# Patient Record
Sex: FEMALE | Race: AMERICAN INDIAN OR ALASKA NATIVE | NOT HISPANIC OR LATINO | URBAN - METROPOLITAN AREA
[De-identification: names, ages, dates, MRNs, and addresses within clinical notes are randomized per-mention and may not be internally consistent; named-entity substitution may affect disease eponyms.]

---

## 2017-02-22 ENCOUNTER — EMERGENCY (EMERGENCY)
Facility: HOSPITAL | Age: 32
LOS: 0 days | Discharge: HOME | End: 2017-02-22

## 2017-06-27 DIAGNOSIS — L50.0 ALLERGIC URTICARIA: ICD-10-CM

## 2017-06-27 DIAGNOSIS — R06.02 SHORTNESS OF BREATH: ICD-10-CM

## 2018-12-30 ENCOUNTER — INPATIENT (INPATIENT)
Facility: HOSPITAL | Age: 33
LOS: 10 days | Discharge: HOME HEALTH | End: 2019-01-10
Attending: INTERNAL MEDICINE | Admitting: INTERNAL MEDICINE
Payer: COMMERCIAL

## 2018-12-30 VITALS
HEART RATE: 76 BPM | TEMPERATURE: 99 F | SYSTOLIC BLOOD PRESSURE: 141 MMHG | RESPIRATION RATE: 17 BRPM | DIASTOLIC BLOOD PRESSURE: 86 MMHG | OXYGEN SATURATION: 99 %

## 2018-12-30 PROCEDURE — 99291 CRITICAL CARE FIRST HOUR: CPT

## 2018-12-30 RX ORDER — ONDANSETRON 8 MG/1
4 TABLET, FILM COATED ORAL EVERY 4 HOURS
Qty: 0 | Refills: 0 | Status: DISCONTINUED | OUTPATIENT
Start: 2018-12-30 | End: 2019-01-07

## 2018-12-30 RX ORDER — ACETAMINOPHEN 500 MG
1000 TABLET ORAL ONCE
Qty: 0 | Refills: 0 | Status: COMPLETED | OUTPATIENT
Start: 2018-12-30 | End: 2018-12-30

## 2018-12-30 RX ORDER — FENTANYL CITRATE 50 UG/ML
25 INJECTION INTRAVENOUS ONCE
Qty: 0 | Refills: 0 | Status: DISCONTINUED | OUTPATIENT
Start: 2018-12-30 | End: 2018-12-30

## 2018-12-30 RX ORDER — SODIUM CHLORIDE 9 MG/ML
1000 INJECTION INTRAMUSCULAR; INTRAVENOUS; SUBCUTANEOUS
Qty: 0 | Refills: 0 | Status: DISCONTINUED | OUTPATIENT
Start: 2018-12-30 | End: 2019-01-03

## 2018-12-30 RX ORDER — NICARDIPINE HYDROCHLORIDE 30 MG/1
5 CAPSULE, EXTENDED RELEASE ORAL
Qty: 40 | Refills: 0 | Status: DISCONTINUED | OUTPATIENT
Start: 2018-12-30 | End: 2019-01-04

## 2018-12-30 RX ORDER — MAGNESIUM SULFATE 500 MG/ML
0.5 VIAL (ML) INJECTION
Qty: 40 | Refills: 0 | Status: DISCONTINUED | OUTPATIENT
Start: 2018-12-30 | End: 2019-01-03

## 2018-12-30 RX ORDER — LEVETIRACETAM 250 MG/1
750 TABLET, FILM COATED ORAL EVERY 12 HOURS
Qty: 0 | Refills: 0 | Status: DISCONTINUED | OUTPATIENT
Start: 2018-12-30 | End: 2019-01-09

## 2018-12-30 RX ORDER — NIMODIPINE 60 MG/10ML
30 SOLUTION ORAL
Qty: 0 | Refills: 0 | Status: DISCONTINUED | OUTPATIENT
Start: 2018-12-30 | End: 2019-01-07

## 2018-12-30 RX ADMIN — FENTANYL CITRATE 25 MICROGRAM(S): 50 INJECTION INTRAVENOUS at 23:24

## 2018-12-30 RX ADMIN — NIMODIPINE 30 MILLIGRAM(S): 60 SOLUTION ORAL at 23:25

## 2018-12-30 RX ADMIN — NICARDIPINE HYDROCHLORIDE 25 MG/HR: 30 CAPSULE, EXTENDED RELEASE ORAL at 22:18

## 2018-12-30 RX ADMIN — Medication 400 MILLIGRAM(S): at 22:13

## 2018-12-30 RX ADMIN — SODIUM CHLORIDE 50 MILLILITER(S): 9 INJECTION INTRAMUSCULAR; INTRAVENOUS; SUBCUTANEOUS at 22:21

## 2018-12-30 NOTE — CONSULT NOTE ADULT - ASSESSMENT
34 y/o female with SAH  - Neuro checks q1 hr, low thresh hold for repeat scan  - Keppra 750 q12  - Mag drip  - Cardene drip  - NPO  - IV fluids  - CT head/neck and CTA head  - HOB elevated  - Nimodipine  - BP goal of systolic <120-130  - Seen and examined by Dr. Gamino

## 2018-12-30 NOTE — CONSULT NOTE ADULT - ATTENDING COMMENTS
As above pt HH 2, Al 1. CTH concerning for aneurysmal SAH. CTA with no obvious aneurysm. Plan for angio in AM. Please call immediately for any change in ms/neuroexam

## 2018-12-30 NOTE — CONSULT NOTE ADULT - SUBJECTIVE AND OBJECTIVE BOX
INTERVAL HPI/OVERNIGHT EVENTS:    HPI: 32 y/o female with hx of gestational hypertension presenting with severe headache since 3:30pm today. Pt is post partum day 2. Seen originally at [facility] and found to have SAH on CT. Now pt continues complaining of severe HA 7 out of 10 in intensity and worsening. She has photophobia as well Slightly lethargic appearing but answering appropriately and following commands.      PAST MEDICAL & SURGICAL HISTORY:      MEDICATIONS  (STANDING):    MEDICATIONS  (PRN):      Allergies      Intolerances        PHYSICAL EXAM:    GENERAL: Moderate distress secondary to diffuse headache, alert, arousable, following commands, answering appropriately.   HEAD:  Atraumatic, Normocephalic  EYES: EOMI, PERRLA, conjunctiva and sclera clear  NERVOUS SYSTEM:  Awake  alert oriented to self, place situation   Fund of knowledge, recent and remote memory are intact   Mood; normal affect   CN II-XII intact PERRRL, EOMI, no ptosis, no Nystagmus, normal shoulder shrug   Face symmetrical tongue is midline speech is clear and fluent  Motor: 5/5 UE/LE all muscle groups   Sensory: No deficit to light touch   Gait is not tested      EXTREMITIES:  2+ Peripheral Pulses, No clubbing, cyanosis, or edema      LABS:                RADIOLOGY & ADDITIONAL TESTS: INTERVAL HPI/OVERNIGHT EVENTS:    HPI: 32 y/o female with hx of gestational hypertension presenting with severe headache since 3:30pm today. Pt is post partum day 2. Seen originally at [facility] and found to have SAH on CT. Now pt continues complaining of severe HA 7 out of 10 in intensity and worsening. She has photophobia as well Slightly lethargic appearing but answering appropriately and following commands.      PAST MEDICAL & SURGICAL HISTORY:      MEDICATIONS  (STANDING):    MEDICATIONS  (PRN):      Allergies      Intolerances        PHYSICAL EXAM:    GENERAL: Moderate distress secondary to diffuse headache, alert, arousable, following commands, answering appropriately.   HEAD:  Atraumatic, Normocephalic  EYES: EOMI, PERRLA, conjunctiva and sclera clear  NERVOUS SYSTEM:  Awake  alert oriented to self, place situation   Fund of knowledge, recent and remote memory are intact   Mood; normal affect   CN II-XII intact PERRRL, EOMI, no ptosis, no Nystagmus, normal shoulder shrug   Face symmetrical tongue is midline speech is clear and fluent  Motor: 5/5 UE/LE all muscle groups   Sensory: No deficit to light touch   Gait is not tested

## 2018-12-31 LAB
ALBUMIN SERPL ELPH-MCNC: 3.3 G/DL — LOW (ref 3.5–5.2)
ALBUMIN SERPL ELPH-MCNC: 3.4 G/DL — LOW (ref 3.5–5.2)
ALP SERPL-CCNC: 104 U/L — SIGNIFICANT CHANGE UP (ref 30–115)
ALP SERPL-CCNC: 123 U/L — HIGH (ref 30–115)
ALT FLD-CCNC: 14 U/L — SIGNIFICANT CHANGE UP (ref 0–41)
ALT FLD-CCNC: 15 U/L — SIGNIFICANT CHANGE UP (ref 0–41)
ANION GAP SERPL CALC-SCNC: 15 MMOL/L — HIGH (ref 7–14)
ANION GAP SERPL CALC-SCNC: 16 MMOL/L — HIGH (ref 7–14)
APPEARANCE UR: CLEAR — SIGNIFICANT CHANGE UP
APTT BLD: 29.3 SEC — SIGNIFICANT CHANGE UP (ref 27–39.2)
AST SERPL-CCNC: 20 U/L — SIGNIFICANT CHANGE UP (ref 0–41)
AST SERPL-CCNC: 21 U/L — SIGNIFICANT CHANGE UP (ref 0–41)
BASOPHILS # BLD AUTO: 0.06 K/UL — SIGNIFICANT CHANGE UP (ref 0–0.2)
BASOPHILS NFR BLD AUTO: 0.6 % — SIGNIFICANT CHANGE UP (ref 0–1)
BILIRUB SERPL-MCNC: 0.3 MG/DL — SIGNIFICANT CHANGE UP (ref 0.2–1.2)
BILIRUB SERPL-MCNC: 0.3 MG/DL — SIGNIFICANT CHANGE UP (ref 0.2–1.2)
BILIRUB UR-MCNC: NEGATIVE — SIGNIFICANT CHANGE UP
BUN SERPL-MCNC: 4 MG/DL — LOW (ref 10–20)
BUN SERPL-MCNC: 5 MG/DL — LOW (ref 10–20)
CALCIUM SERPL-MCNC: 8.4 MG/DL — LOW (ref 8.5–10.1)
CALCIUM SERPL-MCNC: 8.8 MG/DL — SIGNIFICANT CHANGE UP (ref 8.5–10.1)
CHLORIDE SERPL-SCNC: 100 MMOL/L — SIGNIFICANT CHANGE UP (ref 98–110)
CHLORIDE SERPL-SCNC: 99 MMOL/L — SIGNIFICANT CHANGE UP (ref 98–110)
CHOLEST SERPL-MCNC: 220 MG/DL — HIGH (ref 100–200)
CK MB CFR SERPL CALC: 3.7 NG/ML — SIGNIFICANT CHANGE UP (ref 0.6–6.3)
CK SERPL-CCNC: 130 U/L — SIGNIFICANT CHANGE UP (ref 0–225)
CO2 SERPL-SCNC: 22 MMOL/L — SIGNIFICANT CHANGE UP (ref 17–32)
CO2 SERPL-SCNC: 23 MMOL/L — SIGNIFICANT CHANGE UP (ref 17–32)
COLOR SPEC: YELLOW — SIGNIFICANT CHANGE UP
CREAT SERPL-MCNC: 0.5 MG/DL — LOW (ref 0.7–1.5)
CREAT SERPL-MCNC: 0.5 MG/DL — LOW (ref 0.7–1.5)
DIFF PNL FLD: ABNORMAL
EOSINOPHIL # BLD AUTO: 0.34 K/UL — SIGNIFICANT CHANGE UP (ref 0–0.7)
EOSINOPHIL NFR BLD AUTO: 3.4 % — SIGNIFICANT CHANGE UP (ref 0–8)
GLUCOSE SERPL-MCNC: 123 MG/DL — HIGH (ref 70–99)
GLUCOSE SERPL-MCNC: 98 MG/DL — SIGNIFICANT CHANGE UP (ref 70–99)
GLUCOSE UR QL: NEGATIVE — SIGNIFICANT CHANGE UP
HCT VFR BLD CALC: 37.9 % — SIGNIFICANT CHANGE UP (ref 37–47)
HCT VFR BLD CALC: 40 % — SIGNIFICANT CHANGE UP (ref 37–47)
HDLC SERPL-MCNC: 55 MG/DL — SIGNIFICANT CHANGE UP
HGB BLD-MCNC: 12.5 G/DL — SIGNIFICANT CHANGE UP (ref 12–16)
HGB BLD-MCNC: 13.5 G/DL — SIGNIFICANT CHANGE UP (ref 12–16)
IMM GRANULOCYTES NFR BLD AUTO: 0.8 % — HIGH (ref 0.1–0.3)
INR BLD: 0.88 RATIO — SIGNIFICANT CHANGE UP (ref 0.65–1.3)
KETONES UR-MCNC: NEGATIVE — SIGNIFICANT CHANGE UP
LEUKOCYTE ESTERASE UR-ACNC: NEGATIVE — SIGNIFICANT CHANGE UP
LIPID PNL WITH DIRECT LDL SERPL: 146 MG/DL — HIGH (ref 4–129)
LYMPHOCYTES # BLD AUTO: 1.92 K/UL — SIGNIFICANT CHANGE UP (ref 1.2–3.4)
LYMPHOCYTES # BLD AUTO: 19 % — LOW (ref 20.5–51.1)
MAGNESIUM SERPL-MCNC: 1.8 MG/DL — SIGNIFICANT CHANGE UP (ref 1.8–2.4)
MAGNESIUM SERPL-MCNC: 2.3 MG/DL — SIGNIFICANT CHANGE UP (ref 1.8–2.4)
MCHC RBC-ENTMCNC: 28.2 PG — SIGNIFICANT CHANGE UP (ref 27–31)
MCHC RBC-ENTMCNC: 29.2 PG — SIGNIFICANT CHANGE UP (ref 27–31)
MCHC RBC-ENTMCNC: 33 G/DL — SIGNIFICANT CHANGE UP (ref 32–37)
MCHC RBC-ENTMCNC: 33.8 G/DL — SIGNIFICANT CHANGE UP (ref 32–37)
MCV RBC AUTO: 85.4 FL — SIGNIFICANT CHANGE UP (ref 81–99)
MCV RBC AUTO: 86.4 FL — SIGNIFICANT CHANGE UP (ref 81–99)
MONOCYTES # BLD AUTO: 0.51 K/UL — SIGNIFICANT CHANGE UP (ref 0.1–0.6)
MONOCYTES NFR BLD AUTO: 5 % — SIGNIFICANT CHANGE UP (ref 1.7–9.3)
NEUTROPHILS # BLD AUTO: 7.19 K/UL — HIGH (ref 1.4–6.5)
NEUTROPHILS NFR BLD AUTO: 71.2 % — SIGNIFICANT CHANGE UP (ref 42.2–75.2)
NITRITE UR-MCNC: NEGATIVE — SIGNIFICANT CHANGE UP
NRBC # BLD: 0 /100 WBCS — SIGNIFICANT CHANGE UP (ref 0–0)
NT-PROBNP SERPL-SCNC: 311 PG/ML — HIGH (ref 0–300)
OSMOLALITY UR: 513 MOS/KG — SIGNIFICANT CHANGE UP (ref 50–1400)
PH UR: 8 — SIGNIFICANT CHANGE UP (ref 5–8)
PLATELET # BLD AUTO: 217 K/UL — SIGNIFICANT CHANGE UP (ref 130–400)
PLATELET # BLD AUTO: 219 K/UL — SIGNIFICANT CHANGE UP (ref 130–400)
POTASSIUM SERPL-MCNC: 3.6 MMOL/L — SIGNIFICANT CHANGE UP (ref 3.5–5)
POTASSIUM SERPL-MCNC: 3.7 MMOL/L — SIGNIFICANT CHANGE UP (ref 3.5–5)
POTASSIUM SERPL-SCNC: 3.6 MMOL/L — SIGNIFICANT CHANGE UP (ref 3.5–5)
POTASSIUM SERPL-SCNC: 3.7 MMOL/L — SIGNIFICANT CHANGE UP (ref 3.5–5)
PROT SERPL-MCNC: 5.8 G/DL — LOW (ref 6–8)
PROT SERPL-MCNC: 6.1 G/DL — SIGNIFICANT CHANGE UP (ref 6–8)
PROT UR-MCNC: ABNORMAL
PROTHROM AB SERPL-ACNC: 10.1 SEC — SIGNIFICANT CHANGE UP (ref 9.95–12.87)
RBC # BLD: 4.44 M/UL — SIGNIFICANT CHANGE UP (ref 4.2–5.4)
RBC # BLD: 4.63 M/UL — SIGNIFICANT CHANGE UP (ref 4.2–5.4)
RBC # FLD: 13.2 % — SIGNIFICANT CHANGE UP (ref 11.5–14.5)
RBC # FLD: 13.4 % — SIGNIFICANT CHANGE UP (ref 11.5–14.5)
SODIUM SERPL-SCNC: 137 MMOL/L — SIGNIFICANT CHANGE UP (ref 135–146)
SODIUM SERPL-SCNC: 138 MMOL/L — SIGNIFICANT CHANGE UP (ref 135–146)
SODIUM UR-SCNC: 197 MMOL/L — SIGNIFICANT CHANGE UP
SP GR SPEC: >=1.03 — SIGNIFICANT CHANGE UP (ref 1.01–1.03)
TOTAL CHOLESTEROL/HDL RATIO MEASUREMENT: 4 RATIO — SIGNIFICANT CHANGE UP (ref 4–5.5)
TRIGL SERPL-MCNC: 247 MG/DL — HIGH (ref 10–149)
TROPONIN T SERPL-MCNC: <0.01 NG/ML — SIGNIFICANT CHANGE UP
TSH SERPL-MCNC: 2.3 UIU/ML — SIGNIFICANT CHANGE UP (ref 0.27–4.2)
UROBILINOGEN FLD QL: 0.2 — SIGNIFICANT CHANGE UP (ref 0.2–0.2)
WBC # BLD: 10.1 K/UL — SIGNIFICANT CHANGE UP (ref 4.8–10.8)
WBC # BLD: 9.82 K/UL — SIGNIFICANT CHANGE UP (ref 4.8–10.8)
WBC # FLD AUTO: 10.1 K/UL — SIGNIFICANT CHANGE UP (ref 4.8–10.8)
WBC # FLD AUTO: 9.82 K/UL — SIGNIFICANT CHANGE UP (ref 4.8–10.8)

## 2018-12-31 PROCEDURE — 99233 SBSQ HOSP IP/OBS HIGH 50: CPT

## 2018-12-31 RX ORDER — PANTOPRAZOLE SODIUM 20 MG/1
40 TABLET, DELAYED RELEASE ORAL DAILY
Qty: 0 | Refills: 0 | Status: DISCONTINUED | OUTPATIENT
Start: 2018-12-31 | End: 2019-01-02

## 2018-12-31 RX ORDER — LEVETIRACETAM 250 MG/1
750 TABLET, FILM COATED ORAL ONCE
Qty: 0 | Refills: 0 | Status: COMPLETED | OUTPATIENT
Start: 2018-12-31 | End: 2018-12-31

## 2018-12-31 RX ORDER — ACETAMINOPHEN 500 MG
1000 TABLET ORAL ONCE
Qty: 0 | Refills: 0 | Status: COMPLETED | OUTPATIENT
Start: 2018-12-31 | End: 2018-12-31

## 2018-12-31 RX ORDER — ACETAMINOPHEN 500 MG
1000 TABLET ORAL ONCE
Qty: 0 | Refills: 0 | Status: DISCONTINUED | OUTPATIENT
Start: 2018-12-31 | End: 2018-12-31

## 2018-12-31 RX ORDER — FENTANYL CITRATE 50 UG/ML
25 INJECTION INTRAVENOUS ONCE
Qty: 0 | Refills: 0 | Status: DISCONTINUED | OUTPATIENT
Start: 2018-12-31 | End: 2018-12-31

## 2018-12-31 RX ORDER — ACETAMINOPHEN 500 MG
650 TABLET ORAL EVERY 6 HOURS
Qty: 0 | Refills: 0 | Status: DISCONTINUED | OUTPATIENT
Start: 2018-12-31 | End: 2019-01-03

## 2018-12-31 RX ADMIN — NIMODIPINE 30 MILLIGRAM(S): 60 SOLUTION ORAL at 23:02

## 2018-12-31 RX ADMIN — FENTANYL CITRATE 25 MICROGRAM(S): 50 INJECTION INTRAVENOUS at 05:45

## 2018-12-31 RX ADMIN — NIMODIPINE 30 MILLIGRAM(S): 60 SOLUTION ORAL at 18:33

## 2018-12-31 RX ADMIN — NIMODIPINE 30 MILLIGRAM(S): 60 SOLUTION ORAL at 06:35

## 2018-12-31 RX ADMIN — NIMODIPINE 30 MILLIGRAM(S): 60 SOLUTION ORAL at 21:04

## 2018-12-31 RX ADMIN — Medication 400 MILLIGRAM(S): at 09:38

## 2018-12-31 RX ADMIN — Medication 1000 MILLIGRAM(S): at 09:53

## 2018-12-31 RX ADMIN — LEVETIRACETAM 400 MILLIGRAM(S): 250 TABLET, FILM COATED ORAL at 05:32

## 2018-12-31 RX ADMIN — FENTANYL CITRATE 25 MICROGRAM(S): 50 INJECTION INTRAVENOUS at 22:30

## 2018-12-31 RX ADMIN — NIMODIPINE 30 MILLIGRAM(S): 60 SOLUTION ORAL at 00:40

## 2018-12-31 RX ADMIN — Medication 400 MILLIGRAM(S): at 01:04

## 2018-12-31 RX ADMIN — NIMODIPINE 30 MILLIGRAM(S): 60 SOLUTION ORAL at 15:04

## 2018-12-31 RX ADMIN — NIMODIPINE 30 MILLIGRAM(S): 60 SOLUTION ORAL at 08:49

## 2018-12-31 RX ADMIN — FENTANYL CITRATE 25 MICROGRAM(S): 50 INJECTION INTRAVENOUS at 05:31

## 2018-12-31 RX ADMIN — Medication 650 MILLIGRAM(S): at 15:30

## 2018-12-31 RX ADMIN — NIMODIPINE 30 MILLIGRAM(S): 60 SOLUTION ORAL at 13:37

## 2018-12-31 RX ADMIN — LEVETIRACETAM 400 MILLIGRAM(S): 250 TABLET, FILM COATED ORAL at 01:05

## 2018-12-31 RX ADMIN — NIMODIPINE 30 MILLIGRAM(S): 60 SOLUTION ORAL at 03:11

## 2018-12-31 RX ADMIN — Medication 650 MILLIGRAM(S): at 22:00

## 2018-12-31 RX ADMIN — Medication 12.5 GM/HR: at 00:33

## 2018-12-31 RX ADMIN — LEVETIRACETAM 400 MILLIGRAM(S): 250 TABLET, FILM COATED ORAL at 18:33

## 2018-12-31 RX ADMIN — Medication 1000 MILLIGRAM(S): at 05:46

## 2018-12-31 RX ADMIN — FENTANYL CITRATE 25 MICROGRAM(S): 50 INJECTION INTRAVENOUS at 22:15

## 2018-12-31 RX ADMIN — PANTOPRAZOLE SODIUM 40 MILLIGRAM(S): 20 TABLET, DELAYED RELEASE ORAL at 13:38

## 2018-12-31 RX ADMIN — Medication 650 MILLIGRAM(S): at 16:30

## 2018-12-31 RX ADMIN — Medication 650 MILLIGRAM(S): at 21:30

## 2018-12-31 RX ADMIN — Medication 1000 MILLIGRAM(S): at 01:31

## 2018-12-31 RX ADMIN — Medication 400 MILLIGRAM(S): at 05:16

## 2018-12-31 NOTE — PROGRESS NOTE ADULT - ASSESSMENT
Impression:  33 year old woman  presented 2 days post partum with a history of gestational hypertension, PCOS and asthma transferred from [facility] for management of SAH. As per patient she developed severe headache on day of presentation. Headache started while patient laying down and described as sudden in onset, diffuse , 8/10 in intensity and progressively worsened since onset a/w photophobia , nausea but no vomiting. CTH at [facility] showed SAH for which the neurosurgeon at [facility] Dr. Calderon discussed case with Dr. Rider and patient was transferred to [facility] for possible intervention. CTA of the head failed to reveal vascular abnormality. No events overnight, she reports resolution of headache. Exam is non focal, she reports no headache.     Suggestions:  Diagnostic cerebral angiogram +/- embolization.  Continue Cardene.  Continue Nimotipine.  Continue Magnesium.   Keep magnesium >2.  Continue Keppra.  Seizure precautions.

## 2018-12-31 NOTE — H&P ADULT - FAMILY HISTORY
Father  Still living? Yes, Estimated age: Age Unknown  Family history of high cholesterol, Age at diagnosis: Age Unknown     Mother  Still living? Yes, Estimated age: Age Unknown  Family history of hypertension, Age at diagnosis: Age Unknown

## 2018-12-31 NOTE — CONSULT NOTE ADULT - SUBJECTIVE AND OBJECTIVE BOX
CC: SAH POST PARTUM DAY 2      HPI:   34 y/o Right handed  female  with hx of gestational hypertension Post partum day 2  transferred from [facility] for management of SAH. As per patient she developed severe headache since 3:30pm today. Headache described as sudden in onset , worst headache of her life which was diffuse and throbbing in nature , 8/10 in intensity and progressively worsening since afternoon a/w neck pain and photophobia , nausea but no vomiting. Headache worsens with head movements . CTH at [facility] showed SAH and the neurosurgeon at [facility] dr Calderon discussed case with Neuro interventionalist dr Monge and patient was transferred to [facility] for possible  coiling in am .   Patient denies any h/o headache or brain bleed with prior pregnancy , she also denies any coagulopathies.     Home Medications:  Denies taking medications at home    Social history  Denies smoking alcohol or drug use    Family history  Denies significant family history      Neuro Exam:  General inspection: patient appears to be in pain reports 7/10 headache   Orientation: oriented to person, place time and situation, speech and language intact, normal attention and comprehension,   Cranial Nerves: Eomi, vff, face symmetric, has photophobia , no nystagmus noted on this exam, mild periorbital swelling, facial sensation is intact, tongue midline normal shoulder shrug  Motor: Bilateral upper extremity 5/5 no drift             lower extremities 5/5 with some give-way weakness secondary to pain  Sensory exam: intact and symmetric  Coordination:. No dysmetria or limb ataxia  Plantar responses downgoing bilaterally        Terrell and Balbuena Score 2    Allergies    Alcohol (Flushing)  Tamiflu (Unknown)    Intolerances      MEDICATIONS  (STANDING):  levETIRAcetam  IVPB 750 milliGRAM(s) IV Intermittent every 12 hours  magnesium sulfate Infusion 0.5 Gm/Hr (12.5 mL/Hr) IV Continuous <Continuous>  niCARdipine Infusion 5 mG/Hr (25 mL/Hr) IV Continuous <Continuous>  niMODipine 30 milliGRAM(s) Oral every 3 hours  pantoprazole  Injectable 40 milliGRAM(s) IV Push daily  sodium chloride 0.9%. 1000 milliLiter(s) (50 mL/Hr) IV Continuous <Continuous>    MEDICATIONS  (PRN):  ondansetron Injectable 4 milliGRAM(s) IV Push every 4 hours PRN Nausea and/or Vomiting      LABS:                        13.5   9.82  )-----------( 217      ( 31 Dec 2018 01:13 )             40.0     12-31    137  |  99  |  5<L>  ----------------------------<  123<H>  3.6   |  23  |  0.5<L>    Ca    8.8      31 Dec 2018 01:13  Mg     1.8     12-31    TPro  6.1  /  Alb  3.4<L>  /  TBili  0.3  /  DBili  x   /  AST  21  /  ALT  15  /  AlkPhos  123<H>  12-31            Neuro Imaging:  < from: CT Head No Cont (12.30.18 @ 22:56) >  FINDINGS:    Small subarachnoid hemorrhage centered in the left frontotemporal region   (series 3, images 12-17). Local mass effect without midline shift.    The ventricles and cortical sulci areappropriate for the patient's   stated age.    Gray-white matter differentiation is preserved.    There is no evidence for significant space-occupying process or recent   territorial infarction.    The calvarium is intact. Visualized paranasal sinuses and mastoids are   well-aerated.      IMPRESSION:    Small subarachnoid hemorrhage centered in the left frontotemporal region.    < end of copied text >            < from: CT Angio Neck w/ IV Cont (12.30.18 @ 23:18) >    FINDINGS:      CTA neck:     The aortic arch, origin of the great vessels and subclavian arteries are   unremarkable. The bilateral common carotid arteries and internal carotid   arteries are unremarkable without significant stenosis seen.     The bilateral vertebral arteries and basilar artery are unremarkable.    Soft tissues are unremarkable. Visualized osseous structures are   unremarkable.      CTA head:     The distal internal carotid arteries, middle cerebral arteries and   anterior cerebral arteries are unremarkable without significant    stenosis. The basilar artery, superior cerebellar arteries and posterior   cerebral arteries are unremarkable without significant stenosis.    Subarachnoid hemorrhage, better evaluated on concurrent noncontrast CT   head.    No definite vascular malformation is identified.      IMPRESSION:     No large vessel occlusion or significant stenosis of the vessels of the   head and neck.    No definite vascular malformation is identified.    < end of copied text >    EEG:     Echo:   Carotid Doppler: N/A  Telemetry: CC: SAH POST PARTUM DAY 2      HPI:   32 y/o Right handed  female  with hx of gestational hypertension Post partum day 2  transferred from [facility] for management of SAH. As per patient she developed severe headache since 3:30pm today. Headache described as sudden in onset , worst headache of her life which was diffuse and throbbing in nature , 8/10 in intensity and progressively worsening since afternoon a/w neck pain and photophobia , nausea but no vomiting. Headache worsens with head movements . CTH at [facility] showed SAH and the neurosurgeon at [facility] dr Calderon discussed case with Neuro interventionalist dr Monge and patient was transferred to [facility] for possible  coiling in am . Patient denies any h/o headache or brain bleed with prior pregnancy , she also denies any coagulopathies.     Home Medications:  Denies taking medications at home    Social history  Denies smoking alcohol or drug use    Family history  Denies significant family history      Neuro Exam:  General inspection: patient appears to be in pain reports 7/10 headache   Orientation: oriented to person, place time and situation, speech and language intact, normal attention and comprehension,   Cranial Nerves: Eomi, vff, face symmetric, has photophobia , no nystagmus noted on this exam, mild periorbital swelling, facial sensation is intact, tongue midline normal shoulder shrug  Motor: Bilateral upper extremity 5/5 no drift             lower extremities 5/5 with some give-way weakness secondary to pain  Sensory exam: intact and symmetric  Coordination:. No dysmetria or limb ataxia  Plantar responses downgoing bilaterally  +mild nuchal rigidity         Hunt and Balbuena Score 2  Gcs 15      Allergies    Alcohol (Flushing)  Tamiflu (Unknown)    Intolerances      MEDICATIONS  (STANDING):  levETIRAcetam  IVPB 750 milliGRAM(s) IV Intermittent every 12 hours  magnesium sulfate Infusion 0.5 Gm/Hr (12.5 mL/Hr) IV Continuous <Continuous>  niCARdipine Infusion 5 mG/Hr (25 mL/Hr) IV Continuous <Continuous>  niMODipine 30 milliGRAM(s) Oral every 3 hours  pantoprazole  Injectable 40 milliGRAM(s) IV Push daily  sodium chloride 0.9%. 1000 milliLiter(s) (50 mL/Hr) IV Continuous <Continuous>    MEDICATIONS  (PRN):  ondansetron Injectable 4 milliGRAM(s) IV Push every 4 hours PRN Nausea and/or Vomiting      LABS:                        13.5   9.82  )-----------( 217      ( 31 Dec 2018 01:13 )             40.0     12-31    137  |  99  |  5<L>  ----------------------------<  123<H>  3.6   |  23  |  0.5<L>    Ca    8.8      31 Dec 2018 01:13  Mg     1.8     12-31    TPro  6.1  /  Alb  3.4<L>  /  TBili  0.3  /  DBili  x   /  AST  21  /  ALT  15  /  AlkPhos  123<H>  12-31            Neuro Imaging:  < from: CT Head No Cont (12.30.18 @ 22:56) >  FINDINGS:    Small subarachnoid hemorrhage centered in the left frontotemporal region   (series 3, images 12-17). Local mass effect without midline shift.    The ventricles and cortical sulci areappropriate for the patient's   stated age.    Gray-white matter differentiation is preserved.    There is no evidence for significant space-occupying process or recent   territorial infarction.    The calvarium is intact. Visualized paranasal sinuses and mastoids are   well-aerated.      IMPRESSION:    Small subarachnoid hemorrhage centered in the left frontotemporal region.    < end of copied text >            < from: CT Angio Neck w/ IV Cont (12.30.18 @ 23:18) >    FINDINGS:      CTA neck:     The aortic arch, origin of the great vessels and subclavian arteries are   unremarkable. The bilateral common carotid arteries and internal carotid   arteries are unremarkable without significant stenosis seen.     The bilateral vertebral arteries and basilar artery are unremarkable.    Soft tissues are unremarkable. Visualized osseous structures are   unremarkable.      CTA head:     The distal internal carotid arteries, middle cerebral arteries and   anterior cerebral arteries are unremarkable without significant    stenosis. The basilar artery, superior cerebellar arteries and posterior   cerebral arteries are unremarkable without significant stenosis.    Subarachnoid hemorrhage, better evaluated on concurrent noncontrast CT   head.    No definite vascular malformation is identified.      IMPRESSION:     No large vessel occlusion or significant stenosis of the vessels of the   head and neck.    No definite vascular malformation is identified.    < end of copied text >    EEG:     Echo:   Carotid Doppler: N/A  Telemetry: CC: SAH POST PARTUM DAY 2      HPI:   32 y/o Right handed  female  with hx of gestational hypertension Post partum day 2  transferred from [facility] for management of SAH. As per patient she developed severe headache since 3:30pm today. Headache described as sudden in onset , worst headache of her life which was diffuse and throbbing in nature , 8/10 in intensity and progressively worsening since afternoon a/w neck pain and photophobia , nausea but no vomiting. Headache worsens with head movements . CTH at [facility] showed SAH and the neurosurgeon at [facility] dr Calderon discussed case with Neuro interventionalist dr Rider and patient was transferred to [facility] for further treatment by Dr. Rider at [facility]. Patient denies any h/o headache or brain bleed with prior pregnancy , she also denies any coagulopathies.     Home Medications:  Denies taking medications at home    Social history  Denies smoking alcohol or drug use    Family history  Denies significant family history      Neuro Exam:  General inspection: patient appears to be in pain reports 7/10 headache   Orientation: oriented to person, place time and situation, speech and language intact, normal attention and comprehension,   Cranial Nerves: Eomi, vff, face symmetric, has photophobia , no nystagmus noted on this exam, mild periorbital swelling, facial sensation is intact, tongue midline normal shoulder shrug  Motor: Bilateral upper extremity 5/5 no drift             lower extremities 5/5 with some give-way weakness secondary to pain  Sensory exam: intact and symmetric  Coordination:. No dysmetria or limb ataxia  Plantar responses downgoing bilaterally  +mild nuchal rigidity         Hunt and Balbuena Score 2  Gcs 15  m-Al:1      Allergies    Alcohol (Flushing)  Tamiflu (Unknown)    Intolerances      MEDICATIONS  (STANDING):  levETIRAcetam  IVPB 750 milliGRAM(s) IV Intermittent every 12 hours  magnesium sulfate Infusion 0.5 Gm/Hr (12.5 mL/Hr) IV Continuous <Continuous>  niCARdipine Infusion 5 mG/Hr (25 mL/Hr) IV Continuous <Continuous>  niMODipine 30 milliGRAM(s) Oral every 3 hours  pantoprazole  Injectable 40 milliGRAM(s) IV Push daily  sodium chloride 0.9%. 1000 milliLiter(s) (50 mL/Hr) IV Continuous <Continuous>    MEDICATIONS  (PRN):  ondansetron Injectable 4 milliGRAM(s) IV Push every 4 hours PRN Nausea and/or Vomiting      LABS:                        13.5   9.82  )-----------( 217      ( 31 Dec 2018 01:13 )             40.0     12-31    137  |  99  |  5<L>  ----------------------------<  123<H>  3.6   |  23  |  0.5<L>    Ca    8.8      31 Dec 2018 01:13  Mg     1.8     12-31    TPro  6.1  /  Alb  3.4<L>  /  TBili  0.3  /  DBili  x   /  AST  21  /  ALT  15  /  AlkPhos  123<H>  12-31            Neuro Imaging:  < from: CT Head No Cont (12.30.18 @ 22:56) >  FINDINGS:    Small subarachnoid hemorrhage centered in the left frontotemporal region   (series 3, images 12-17). Local mass effect without midline shift.    The ventricles and cortical sulci areappropriate for the patient's   stated age.    Gray-white matter differentiation is preserved.    There is no evidence for significant space-occupying process or recent   territorial infarction.    The calvarium is intact. Visualized paranasal sinuses and mastoids are   well-aerated.      IMPRESSION:    Small subarachnoid hemorrhage centered in the left frontotemporal region.    < end of copied text >            < from: CT Angio Neck w/ IV Cont (12.30.18 @ 23:18) >    FINDINGS:      CTA neck:     The aortic arch, origin of the great vessels and subclavian arteries are   unremarkable. The bilateral common carotid arteries and internal carotid   arteries are unremarkable without significant stenosis seen.     The bilateral vertebral arteries and basilar artery are unremarkable.    Soft tissues are unremarkable. Visualized osseous structures are   unremarkable.      CTA head:     The distal internal carotid arteries, middle cerebral arteries and   anterior cerebral arteries are unremarkable without significant    stenosis. The basilar artery, superior cerebellar arteries and posterior   cerebral arteries are unremarkable without significant stenosis.    Subarachnoid hemorrhage, better evaluated on concurrent noncontrast CT   head.    No definite vascular malformation is identified.      IMPRESSION:     No large vessel occlusion or significant stenosis of the vessels of the   head and neck.    No definite vascular malformation is identified.    < end of copied text >    EEG:     Echo:   Carotid Doppler: N/A  Telemetry:

## 2018-12-31 NOTE — PACU DISCHARGE NOTE - COMMENTS
S/P cerebral angiogram under local and IV sedation   pt taken directly to ICU with cardiac monitor in a stable condition report given to RN

## 2018-12-31 NOTE — PRE-ANESTHESIA EVALUATION ADULT - NSANTHOSAYNRD_GEN_A_CORE
No. PAVAN screening performed.  STOP BANG Legend: 0-2 = LOW Risk; 3-4 = INTERMEDIATE Risk; 5-8 = HIGH Risk

## 2018-12-31 NOTE — CONSULT NOTE ADULT - ASSESSMENT
IMPRESSION:    SAH      PLAN:    CNS: keppra,nimodipine, MRV, interventional neuro f/u    HEENT: Oral care    PULMONARY:  HOB @ 45 degrees    CARDIOVASCULAR: -120    GI: GI prophylaxis.  Feeding     RENAL:  Follow up lytes.  Correct as needed    INFECTIOUS DISEASE: Follow up cultures    HEMATOLOGICAL:  DVT prophylaxis. SCD    ENDOCRINE:  Follow up FS.  Insulin protocol if needed.    MUSCULOSKELETAL:    Monitor in ICU

## 2018-12-31 NOTE — H&P ADULT - NSHPLABSRESULTS_GEN_ALL_CORE
LABS:                        13.5   9.82  )-----------( 217      ( 31 Dec 2018 01:13 )             40.0     31 Dec 2018 01:13    137    |  99     |  5      ----------------------------<  123    3.6     |  23     |  0.5      Ca    8.8        31 Dec 2018 01:13  Mg     1.8       31 Dec 2018 01:13    TPro  6.1    /  Alb  3.4    /  TBili  0.3    /  DBili  x      /  AST  21     /  ALT  15     /  AlkPhos  123    31 Dec 2018 01:13    < from: CT Angio Neck w/ IV Cont (12.30.18 @ 23:18) >    No large vessel occlusion or significant stenosis of the vessels of the   head and neck.    No definite vascular malformation is identified.    < end of copied text >    < from: CT Angio Head w/ IV Cont (12.30.18 @ 23:18) >    No large vessel occlusion or significant stenosis of the vessels of the   head and neck.    No definite vascular malformation is identified.    < end of copied text >    < from: CT Head No Cont (12.30.18 @ 22:56) >    mall subarachnoid hemorrhage centered in the left frontotemporal region.    < end of copied text > LABS:                        13.5   9.82  )-----------( 217      ( 31 Dec 2018 01:13 )             40.0     31 Dec 2018 01:13    137    |  99     |  5      ----------------------------<  123    3.6     |  23     |  0.5      Ca    8.8        31 Dec 2018 01:13  Mg     1.8       31 Dec 2018 01:13    TPro  6.1    /  Alb  3.4    /  TBili  0.3    /  DBili  x      /  AST  21     /  ALT  15     /  AlkPhos  123    31 Dec 2018 01:13    < from: CT Angio Neck w/ IV Cont (12.30.18 @ 23:18) >    No large vessel occlusion or significant stenosis of the vessels of the   head and neck.    No definite vascular malformation is identified.    < end of copied text >    < from: CT Angio Head w/ IV Cont (12.30.18 @ 23:18) >    No large vessel occlusion or significant stenosis of the vessels of the   head and neck.    No definite vascular malformation is identified.    < end of copied text >    < from: CT Head No Cont (12.30.18 @ 22:56) >    Small subarachnoid hemorrhage centered in the left frontotemporal region.    < end of copied text >

## 2018-12-31 NOTE — PROGRESS NOTE ADULT - SUBJECTIVE AND OBJECTIVE BOX
BALDEV KEE  [id number]    HD#  [id number]   SAH non traumatic      Current issues:  33y Female in ICU   c/o ha 3/10 this morning  ,  10/10 last night.  no seizure reported.  no n, v.    no reported motor changes, sensory changes, or vision changes.     HPI:  34 y/o female  who presents 2 days post partum with hx of gestational hypertension, PCOS and asthma transferred from [facility] for management of SAH. As per patient she developed severe headache since 3:30pm on day of presentation. Headache started while patient laying down and described as sudden in onset , diffuse , 8/10 in intensity and progressively worsening since afternoon a/w photophobia , nausea but no vomiting. Headache worsens with head movements. CTH at [facility] showed SAH for which the neurosurgeon at [facility] Dr. Calderon discussed case with dr fontanez and patient was transferred to [facility] for possible intervention like coiling in am .   No history of similar events in past    VS upon admission to MICU bp 120/66 , hr 86, tmax 98.7 (31 Dec 2018 02:15)      Allergies    Alcohol (Flushing)  Tamiflu (Unknown)    Intolerances      MEDICATIONS  (STANDING):  levETIRAcetam  IVPB 750 milliGRAM(s) IV Intermittent every 12 hours  magnesium sulfate Infusion 0.5 Gm/Hr (12.5 mL/Hr) IV Continuous <Continuous>  niCARdipine Infusion 5 mG/Hr (25 mL/Hr) IV Continuous <Continuous>  niMODipine 30 milliGRAM(s) Oral every 3 hours  pantoprazole  Injectable 40 milliGRAM(s) IV Push daily  sodium chloride 0.9%. 1000 milliLiter(s) (50 mL/Hr) IV Continuous <Continuous>    MEDICATIONS  (PRN):  ondansetron Injectable 4 milliGRAM(s) IV Push every 4 hours PRN Nausea and/or Vomiting    Vital Signs Last 24 Hrs  T(C): 36 (31 Dec 2018 04:00), Max: 37.1 (30 Dec 2018 21:30)  T(F): 96.8 (31 Dec 2018 04:00), Max: 98.7 (30 Dec 2018 21:30)  HR: 88 (31 Dec 2018 09:53) (66 - 98)  BP: 127/82 (31 Dec 2018 09:53) (102/54 - 168/98)  BP(mean): 97 (31 Dec 2018 09:53) (71 - 132)  RR: 25 (31 Dec 2018 09:53) (12 - 29)  SpO2: 98% (31 Dec 2018 09:53) (96% - 100%)    I&O's Detail    30 Dec 2018 07:01  -  31 Dec 2018 07:00  --------------------------------------------------------  IN:    IV PiggyBack: 300 mL    magnesium sulfate  Infusion: 100 mL    niCARdipine Infusion: 185 mL    sodium chloride 0.9%.: 500 mL  Total IN: 1085 mL    OUT:    Indwelling Catheter - Urethral: 2500 mL  Total OUT: 2500 mL    Total NET: -1415 mL      31 Dec 2018 07:01  -  31 Dec 2018 10:28  --------------------------------------------------------  IN:    magnesium sulfate  Infusion: 25 mL    niCARdipine Infusion: 25 mL    sodium chloride 0.9%.: 100 mL  Total IN: 150 mL    OUT:    Indwelling Catheter - Urethral: 550 mL  Total OUT: 550 mL    Total NET: -400 mL            138  |  100  |  4<L>  ----------------------------<  98  3.7   |  22  |  0.5<L>    Ca    8.4<L>      31 Dec 2018 05:19  Mg     2.3         TPro  5.8<L>  /  Alb  3.3<L>  /  TBili  0.3  /  DBili  x   /  AST  20  /  ALT  14  /  AlkPhos  104                            12.5   10.10 )-----------( 219      ( 31 Dec 2018 05:19 )             37.9           Exam:  alert and Ox3   PERRLA EOMI  face symmetrical  MAEx4  sensory intact      Plan:  stable  continue with ICU neuro checks  Dr. Newell on case to do Cerebral angiogram this am.   Dr. Gamino aware of above.              Home Medications:    PAST MEDICAL & SURGICAL HISTORY:  Gestational hypertension  Asthma  PCOS (polycystic ovarian syndrome)

## 2018-12-31 NOTE — CONSULT NOTE ADULT - ASSESSMENT
34 y/o Right handed  female  with hx of gestational hypertension Post partum day 2  transferred from [facility] for management of SAH. CTH at [facility] showed SAH and the neurosurgeon at [facility] dr Calderon discussed case with Neuro interventionalist dr Monge and patient was transferred to [facility] for possible coiling in am. CTH shows a small SAH in the left frontotemporal region. Cta h/n failed to reveal a definite vascular malformation.Patient is currently a/o     Plan (as discussed with 32 y/o Right handed  female  with hx of gestational hypertension Post partum day 2  transferred from [facility] for management of SAH. CTH at [facility] showed SAH and the neurosurgeon at [facility] dr Calderon discussed case with Neuro interventionalist dr Monge and patient was transferred to [facility] for possible coiling in am. CTH shows a small SAH in the left frontotemporal region. Cta h/n failed to reveal a definite vascular malformation. Patient is currently a/o following all commands and is non focal . bp on arrival was 168/98      Plan (as discussed with Dr Tereso Brewer)  -ICU monitoring  -neuro checks with vital signs q 1 hr  -keep sbp 120-130   -Start nimodipine 30mg q 3 hrs  -No anticoagulants or antiplatelets  -keep HOB elevated to 30 degrees  -Mechanical dvt prophylaxis (sequential stockings)  -Monitor temperature frequently avoid pyrexia  -start mg drip , monitor mg levels   -Cardene drip  -Keppra 1 gm loading followed by 750mg bid  -please call for acute change in neuro status 34 y/o Right handed  female  with hx of gestational hypertension Post partum day 2  transferred from [facility] for management of SAH. CTH at [facility] showed SAH and the neurosurgeon at [facility] dr Calderon discussed case with Neuro interventionalist dr Monge and patient was transferred to [facility] for possible coiling in am. CTH shows a small SAH in the left frontotemporal region. Cta h/n failed to reveal a definite vascular malformation. Patient is currently a/o following all commands and is non focal . bp on arrival was 168/98      Plan (as discussed with Dr Tereso Brewer)  -ICU monitoring  -neuro checks with vital signs q 1 hr  -keep sbp 120-130   -Start nimodipine 30mg q 3 hrs  -No anticoagulants or antiplatelets  -keep HOB elevated to 30 degrees  -continuous cardiac monitoring  -Mechanical dvt prophylaxis (sequential stockings)  -Monitor temperature frequently avoid pyrexia  -start mg drip , monitor mg levels keep < 4  -Cardene drip  -Keppra 1 gm loading followed by 750mg bid  -please call for acute change in neuro status 32 y/o Right handed  female  with hx of gestational hypertension Post partum day 2  transferred from [facility] for management of SAH. CTH at [facility] showed SAH and the neurosurgeon at [facility] dr Calderon discussed case with Neuro interventionalist dr Monge and patient was transferred to [facility] for possible coiling in am. CTH shows a small SAH in the left frontotemporal region. Cta h/n negative for any vascular malformation. Patient is currently a/o following all commands and is non focal . bp on arrival was 168/98      Plan (as discussed with Dr Tereso rBewer)  -ICU monitoring  -neuro checks with vital signs q 1 hr  -keep sbp 120-130   -Start nimodipine 30mg q 3 hrs  -No anticoagulants or antiplatelets  -keep HOB elevated to 30 degrees  -continuous cardiac monitoring  -Mechanical dvt prophylaxis (sequential stockings)  -Monitor temperature frequently avoid pyrexia  -start mg drip , monitor mg levels keep < 4  -Cardene drip  -Keppra 1 gm loading followed by 750mg bid  -please call for acute change in neuro status

## 2018-12-31 NOTE — H&P ADULT - HISTORY OF PRESENT ILLNESS
32 y/o female Post partum day 2  with hx of gestational hypertension transferred from [facility] for management of SAH   As per patient she developed severe headache since 3:30pm today. Headache described as sudden in onset , diffuse , 8/10 in intensity and progressively worsening since afternoon a/w photophobia , nausea but no vomiting .Headache worsens with head movements . CTH at [facility] showed SAH for which the neurosurgeon at [facility] dr maravilla discussed case with dr fontanez and patient was transferred to [facility] for possible intervention like coiling in am .   No history of similar events in past    VS upon admission to MICU bp 120/66 , hr 86, tmax 98.7 32 y/o female  who presents 2 days post partum with hx of gestational hypertension transferred from [facility] for management of SAH   As per patient she developed severe headache since 3:30pm on day of presentation. Headache described as sudden in onset , diffuse , 8/10 in intensity and progressively worsening since afternoon a/w photophobia , nausea but no vomiting. Headache worsens with head movements. CTH at [facility] showed SAH for which the neurosurgeon at [facility] Dr. Calderon discussed case with dr fontanez and patient was transferred to [facility] for possible intervention like coiling in am .   No history of similar events in past    VS upon admission to MICU bp 120/66 , hr 86, tmax 98.7 34 y/o female  who presents 2 days post partum with hx of gestational hypertension, PCOS and asthma transferred from [facility] for management of SAH. As per patient she developed severe headache since 3:30pm on day of presentation. Headache started while patient laying down and described as sudden in onset , diffuse , 8/10 in intensity and progressively worsening since afternoon a/w photophobia , nausea but no vomiting. Headache worsens with head movements. CTH at [facility] showed SAH for which the neurosurgeon at [facility] Dr. Calderon discussed case with dr fontanez and patient was transferred to [facility] for possible intervention like coiling in am .   No history of similar events in past    VS upon admission to MICU bp 120/66 , hr 86, tmax 98.7

## 2018-12-31 NOTE — PROGRESS NOTE ADULT - SUBJECTIVE AND OBJECTIVE BOX
Post diagnostic cerebral angiogram:  Results pending.    NIHSS post procedure 0.      Neuro checks, vitals, right groin check, distal pulses q15 minutes x2 hours, q30 minutesx 4 hours and q1 hour x18 hours.  Keep in reverse trendelenberg.  Keep systolic blood pressure 100-120.  Care as per ICU team.      Robbie Burris NP  [phone]

## 2018-12-31 NOTE — CONSULT NOTE ADULT - SUBJECTIVE AND OBJECTIVE BOX
Patient is a 33y old  Female who presents with a chief complaint of SAH , transfer from [facility] (31 Dec 2018 12:37)      HPI:  32 y/o female  who presents 2 days post partum with hx of gestational hypertension, PCOS and asthma transferred from [facility] for management of SAH. As per patient she developed severe headache since 3:30pm on day of presentation. Headache started while patient laying down and described as sudden in onset , diffuse , 8/10 in intensity and progressively worsening since afternoon a/w photophobia , nausea but no vomiting. Headache worsens with head movements. CTH at [facility] showed SAH for which the neurosurgeon at [facility] Dr. Calderon discussed case with dr fontanez and patient was transferred to [facility] for possible intervention like coiling in am .   No history of similar events in past    VS upon admission to MICU bp 120/66 , hr 86, tmax 98.7 (31 Dec 2018 02:15)      PAST MEDICAL & SURGICAL HISTORY:  Gestational hypertension  Asthma  PCOS (polycystic ovarian syndrome)      SOCIAL HX:   Smoking     neg                    ETOH      neg                      Other   neg    FAMILY HISTORY:  Family history of hypertension (Mother)  Family history of high cholesterol (Father)      ROS:  See HPI     Allergies    Alcohol (Flushing)  Tamiflu (Unknown)    Intolerances          PHYSICAL EXAM    ICU Vital Signs Last 24 Hrs  T(C): 36.5 (31 Dec 2018 08:00), Max: 37.1 (30 Dec 2018 21:30)  T(F): 97.7 (31 Dec 2018 08:00), Max: 98.7 (30 Dec 2018 21:30)  HR: 76 (31 Dec 2018 14:00) (66 - 98)  BP: 121/70 (31 Dec 2018 12:45) (102/54 - 168/98)  BP(mean): 86 (31 Dec 2018 12:45) (71 - 132)  ABP: --  ABP(mean): --  RR: 13 (31 Dec 2018 14:00) (12 - 31)  SpO2: 97% (31 Dec 2018 14:00) (96% - 100%)      General: in NAD  HEENT:  GISSELLE              Lymph Nodes: No cervical LN   Lungs: Bilateral BS  Cardiovascular: Regular  Abdomen: Soft, Positive BS  Extremities: No clubbing  Skin: Warm  Neurological: Non focal       18 @ 07:01  -  18 @ 07:00  --------------------------------------------------------  IN:    IV PiggyBack: 300 mL    magnesium sulfate  Infusion: 100 mL    niCARdipine Infusion: 185 mL    sodium chloride 0.9%.: 500 mL  Total IN: 1085 mL    OUT:    Indwelling Catheter - Urethral: 2500 mL  Total OUT: 2500 mL    Total NET: -1415 mL      18 @ 07:01  -  18 @ 15:02  --------------------------------------------------------  IN:    magnesium sulfate  Infusion: 87.5 mL    niCARdipine Infusion: 150 mL    sodium chloride 0.9%.: 350 mL  Total IN: 587.5 mL    OUT:    Indwelling Catheter - Urethral: 1850 mL  Total OUT: 1850 mL    Total NET: -1262.5 mL          LABS:                          12.5   10.10 )-----------( 219      ( 31 Dec 2018 05:19 )             37.9                                                   138  |  100  |  4<L>  ----------------------------<  98  3.7   |  22  |  0.5<L>    Ca    8.4<L>      31 Dec 2018 05:19  Mg     2.3         TPro  5.8<L>  /  Alb  3.3<L>  /  TBili  0.3  /  DBili  x   /  AST  20  /  ALT  14  /  AlkPhos  104        PT/INR - ( 31 Dec 2018 08:03 )   PT: 10.10 sec;   INR: 0.88 ratio         PTT - ( 31 Dec 2018 08:03 )  PTT:29.3 sec                                       Urinalysis Basic - ( 31 Dec 2018 00:58 )    Color: Yellow / Appearance: Clear / SG: >=1.030 / pH: x  Gluc: x / Ketone: Negative  / Bili: Negative / Urobili: 0.2   Blood: x / Protein: Trace / Nitrite: Negative   Leuk Esterase: Negative / RBC: 6-10 /HPF / WBC x   Sq Epi: x / Non Sq Epi: x / Bacteria: x        CARDIAC MARKERS ( 31 Dec 2018 05:19 )  x     / <0.01 ng/mL / 130 U/L / x     / 3.7 ng/mL                                            LIVER FUNCTIONS - ( 31 Dec 2018 05:19 )  Alb: 3.3 g/dL / Pro: 5.8 g/dL / ALK PHOS: 104 U/L / ALT: 14 U/L / AST: 20 U/L / GGT: x                                                                                             < from: CT Head No Cont (18 @ 22:56) >  1.  Diffuse subarachnoid hemorrhage, with a more focal round hematoma   within the left frontotemporal region.    2.  Mild hydrocephalus.    < end of copied text >                                              MEDICATIONS  (STANDING):  levETIRAcetam  IVPB 750 milliGRAM(s) IV Intermittent every 12 hours  magnesium sulfate Infusion 0.5 Gm/Hr (12.5 mL/Hr) IV Continuous <Continuous>  niCARdipine Infusion 5 mG/Hr (25 mL/Hr) IV Continuous <Continuous>  niMODipine 30 milliGRAM(s) Oral every 3 hours  pantoprazole  Injectable 40 milliGRAM(s) IV Push daily  sodium chloride 0.9%. 1000 milliLiter(s) (50 mL/Hr) IV Continuous <Continuous>    MEDICATIONS  (PRN):  acetaminophen   Tablet .. 650 milliGRAM(s) Oral every 6 hours PRN Moderate Pain (4 - 6)  ondansetron Injectable 4 milliGRAM(s) IV Push every 4 hours PRN Nausea and/or Vomiting

## 2018-12-31 NOTE — H&P ADULT - ASSESSMENT
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

## 2018-12-31 NOTE — PROGRESS NOTE ADULT - SUBJECTIVE AND OBJECTIVE BOX
33yFemale    HPI:  33 year old woman  presented 2 days post partum with a history of gestational hypertension, PCOS and asthma transferred from [facility] for management of SAH. As per patient she developed severe headache on day of presentation. Headache started while patient laying down and described as sudden in onset, diffuse , 8/10 in intensity and progressively worsened since onset a/w photophobia , nausea but no vomiting. CTH at [facility] showed SAH for which the neurosurgeon at [facility] Dr. Calderon discussed case with Dr. Rider and patient was transferred to [facility] for possible intervention. No events overnight, she reports resolution of headache. CTA of the head failed to reveal vascular abnormality.    PMH  Gestational hypertension  Asthma  PCOS (polycystic ovarian syndrome)      Tests:  < from: CT Head No Cont (18 @ 22:56) >    IMPRESSION:    1.  Diffuse subarachnoid hemorrhage, with a more focal round hematoma   within the left frontotemporal region.    2.  Mild hydrocephalus.    < from: CT Angio Head w/ IV Cont (18 @ 23:18) >  IMPRESSION:     No large vessel occlusion or significant stenosis of the vessels of the   head and neck.    No definite vascular malformation is identified.    Acute Issues:  SAH  Previous Plan/Suggestions:    Overnight Events: None    Medications:  levETIRAcetam  IVPB 750 milliGRAM(s) IV Intermittent every 12 hours  magnesium sulfate Infusion 0.5 Gm/Hr IV Continuous <Continuous>  niCARdipine Infusion 5 mG/Hr IV Continuous <Continuous>  niMODipine 30 milliGRAM(s) Oral every 3 hours  pantoprazole  Injectable 40 milliGRAM(s) IV Push daily  sodium chloride 0.9%. 1000 milliLiter(s) IV Continuous <Continuous>    Vitals  T(F): 96.8 (18 @ 04:00), Max: 98.7 (18 @ 21:30)  HR: 88 (18 @ 09:53) (66 - 98)  BP: 127/82 (18 @ 09:53) (102/54 - 168/98)  RR: 25 (18 @ 09:53) (12 - 29)  SpO2: 98% (18 @ 09:53) (96% - 100%)                        12.5   10.10 )-----------( 219      ( 31 Dec 2018 05:19 )             37.9     12    138  |  100  |  4<L>  ----------------------------<  98  3.7   |  22  |  0.5<L>    Ca    8.4<L>      31 Dec 2018 05:19  Mg     2.3         TPro  5.8<L>  /  Alb  3.3<L>  /  TBili  0.3  /  DBili  x   /  AST  20  /  ALT  14  /  AlkPhos  104  12    PT/INR - ( 31 Dec 2018 08:03 )   PT: 10.10 sec;   INR: 0.88 ratio         PTT - ( 31 Dec 2018 08:03 )  PTT:29.3 sec  Urinalysis Basic - ( 31 Dec 2018 00:58 )    Color: Yellow / Appearance: Clear / SG: >=1.030 / pH: x  Gluc: x / Ketone: Negative  / Bili: Negative / Urobili: 0.2   Blood: x / Protein: Trace / Nitrite: Negative   Leuk Esterase: Negative / RBC: 6-10 /HPF / WBC x   Sq Epi: x / Non Sq Epi: x / Bacteria: x      LIVER FUNCTIONS - ( 31 Dec 2018 05:19 )  Alb: 3.3 g/dL / Pro: 5.8 g/dL / ALK PHOS: 104 U/L / ALT: 14 U/L / AST: 20 U/L / GGT: x             Neuro Exam:  Awake alert oriented to self place time  PEARLA EOMI no visual deficit  No aphasia no dysarthria no facial palsy  No drift to bilateral upper and lower extremities.  No sensory deficit  No neglect or inattention    NIHSS:0    GCS: 15    Hunt&Balbuena: 1  m-Al:1    Impression:  33 year old woman  presented 2 days post partum with a history of gestational hypertension, PCOS and asthma transferred from [facility] for management of SAH. As per patient she developed severe headache on day of presentation. Headache started while patient laying down and described as sudden in onset, diffuse , 8/10 in intensity and progressively worsened since onset a/w photophobia , nausea but no vomiting. CTH at [facility] showed SAH for which the neurosurgeon at [facility] Dr. Calderon discussed case with Dr. Rider and patient was transferred to [facility] for possible intervention. CTA of the head failed to reveal vascular abnormality. No events overnight, she reports resolution of headache. Exam is non focal, she reports no headache.     Suggestions:  Diagnostic cerebral angiogram +/- embolization.  Continue Cardene.  Continue Nimotipine.  Continue Magnesium.   Keep magnesium >2.  Continue Keppra.  Seizure precautions.      Robbie Burris NP  [phone]. 33yFemale    HPI:  33 year old woman  presented 2 days post partum with a history of gestational hypertension, PCOS and asthma transferred from [facility] for management of SAH. As per patient she developed severe headache on day of presentation. Headache started while patient laying down and described as sudden in onset, diffuse , 8/10 in intensity and progressively worsened since onset a/w photophobia , nausea but no vomiting. CTH at [facility] showed SAH for which the neurosurgeon at [facility] Dr. Calderon discussed case with Dr. Rider and patient was transferred to [facility] for possible intervention. No events overnight, she reports resolution of headache. CTA of the head failed to reveal vascular abnormality.    PMH  Gestational hypertension  Asthma  PCOS (polycystic ovarian syndrome)      Tests:  < from: CT Head No Cont (18 @ 22:56) >    IMPRESSION:    1.  Diffuse subarachnoid hemorrhage, with a more focal round hematoma   within the left frontotemporal region.    2.  Mild hydrocephalus.    < from: CT Angio Head w/ IV Cont (18 @ 23:18) >  IMPRESSION:     No large vessel occlusion or significant stenosis of the vessels of the   head and neck.    No definite vascular malformation is identified.    Acute Issues:  SAH  Previous Plan/Suggestions:    Overnight Events: None    Medications:  levETIRAcetam  IVPB 750 milliGRAM(s) IV Intermittent every 12 hours  magnesium sulfate Infusion 0.5 Gm/Hr IV Continuous <Continuous>  niCARdipine Infusion 5 mG/Hr IV Continuous <Continuous>  niMODipine 30 milliGRAM(s) Oral every 3 hours  pantoprazole  Injectable 40 milliGRAM(s) IV Push daily  sodium chloride 0.9%. 1000 milliLiter(s) IV Continuous <Continuous>    Vitals  T(F): 96.8 (18 @ 04:00), Max: 98.7 (18 @ 21:30)  HR: 88 (18 @ 09:53) (66 - 98)  BP: 127/82 (18 @ 09:53) (102/54 - 168/98)  RR: 25 (18 @ 09:53) (12 - 29)  SpO2: 98% (18 @ 09:53) (96% - 100%)                        12.5   10.10 )-----------( 219      ( 31 Dec 2018 05:19 )             37.9         138  |  100  |  4<L>  ----------------------------<  98  3.7   |  22  |  0.5<L>    Ca    8.4<L>      31 Dec 2018 05:19  Mg     2.3         TPro  5.8<L>  /  Alb  3.3<L>  /  TBili  0.3  /  DBili  x   /  AST  20  /  ALT  14  /  AlkPhos  104      PT/INR - ( 31 Dec 2018 08:03 )   PT: 10.10 sec;   INR: 0.88 ratio         PTT - ( 31 Dec 2018 08:03 )  PTT:29.3 sec  Urinalysis Basic - ( 31 Dec 2018 00:58 )    Color: Yellow / Appearance: Clear / SG: >=1.030 / pH: x  Gluc: x / Ketone: Negative  / Bili: Negative / Urobili: 0.2   Blood: x / Protein: Trace / Nitrite: Negative   Leuk Esterase: Negative / RBC: 6-10 /HPF / WBC x   Sq Epi: x / Non Sq Epi: x / Bacteria: x      LIVER FUNCTIONS - ( 31 Dec 2018 05:19 )  Alb: 3.3 g/dL / Pro: 5.8 g/dL / ALK PHOS: 104 U/L / ALT: 14 U/L / AST: 20 U/L / GGT: x             Neuro Exam:  Awake alert oriented to self place time  PEARLA EOMI no visual deficit  No aphasia no dysarthria no facial palsy  No drift to bilateral upper and lower extremities.  No sensory deficit  No neglect or inattention    NIHSS:0    GCS: 15    Hunt&Balbuena: 1  m-Al:1        Robbie Burris NP  [phone].

## 2019-01-01 LAB
ALBUMIN SERPL ELPH-MCNC: 3.3 G/DL — LOW (ref 3.5–5.2)
ALP SERPL-CCNC: 101 U/L — SIGNIFICANT CHANGE UP (ref 30–115)
ALT FLD-CCNC: 14 U/L — SIGNIFICANT CHANGE UP (ref 0–41)
ANION GAP SERPL CALC-SCNC: 16 MMOL/L — HIGH (ref 7–14)
APTT BLD: 29.1 SEC — SIGNIFICANT CHANGE UP (ref 27–39.2)
AST SERPL-CCNC: 16 U/L — SIGNIFICANT CHANGE UP (ref 0–41)
BASOPHILS # BLD AUTO: 0.07 K/UL — SIGNIFICANT CHANGE UP (ref 0–0.2)
BASOPHILS NFR BLD AUTO: 0.6 % — SIGNIFICANT CHANGE UP (ref 0–1)
BILIRUB SERPL-MCNC: 0.4 MG/DL — SIGNIFICANT CHANGE UP (ref 0.2–1.2)
BUN SERPL-MCNC: 14 MG/DL — SIGNIFICANT CHANGE UP (ref 10–20)
CALCIUM SERPL-MCNC: 8.1 MG/DL — LOW (ref 8.5–10.1)
CHLORIDE SERPL-SCNC: 101 MMOL/L — SIGNIFICANT CHANGE UP (ref 98–110)
CO2 SERPL-SCNC: 21 MMOL/L — SIGNIFICANT CHANGE UP (ref 17–32)
CREAT SERPL-MCNC: 0.7 MG/DL — SIGNIFICANT CHANGE UP (ref 0.7–1.5)
EOSINOPHIL # BLD AUTO: 0.44 K/UL — SIGNIFICANT CHANGE UP (ref 0–0.7)
EOSINOPHIL NFR BLD AUTO: 3.7 % — SIGNIFICANT CHANGE UP (ref 0–8)
GLUCOSE SERPL-MCNC: 102 MG/DL — HIGH (ref 70–99)
HCT VFR BLD CALC: 39.6 % — SIGNIFICANT CHANGE UP (ref 37–47)
HGB BLD-MCNC: 12.9 G/DL — SIGNIFICANT CHANGE UP (ref 12–16)
IMM GRANULOCYTES NFR BLD AUTO: 1.8 % — HIGH (ref 0.1–0.3)
INR BLD: 0.88 RATIO — SIGNIFICANT CHANGE UP (ref 0.65–1.3)
LYMPHOCYTES # BLD AUTO: 19.1 % — LOW (ref 20.5–51.1)
LYMPHOCYTES # BLD AUTO: 2.26 K/UL — SIGNIFICANT CHANGE UP (ref 1.2–3.4)
MAGNESIUM SERPL-MCNC: 3.4 MG/DL — CRITICAL HIGH (ref 1.8–2.4)
MCHC RBC-ENTMCNC: 28.2 PG — SIGNIFICANT CHANGE UP (ref 27–31)
MCHC RBC-ENTMCNC: 32.6 G/DL — SIGNIFICANT CHANGE UP (ref 32–37)
MCV RBC AUTO: 86.5 FL — SIGNIFICANT CHANGE UP (ref 81–99)
MONOCYTES # BLD AUTO: 0.87 K/UL — HIGH (ref 0.1–0.6)
MONOCYTES NFR BLD AUTO: 7.3 % — SIGNIFICANT CHANGE UP (ref 1.7–9.3)
NEUTROPHILS # BLD AUTO: 8.01 K/UL — HIGH (ref 1.4–6.5)
NEUTROPHILS NFR BLD AUTO: 67.5 % — SIGNIFICANT CHANGE UP (ref 42.2–75.2)
PHOSPHATE SERPL-MCNC: 3.8 MG/DL — SIGNIFICANT CHANGE UP (ref 2.1–4.9)
PLATELET # BLD AUTO: 262 K/UL — SIGNIFICANT CHANGE UP (ref 130–400)
POTASSIUM SERPL-MCNC: 3.8 MMOL/L — SIGNIFICANT CHANGE UP (ref 3.5–5)
POTASSIUM SERPL-SCNC: 3.8 MMOL/L — SIGNIFICANT CHANGE UP (ref 3.5–5)
PROT SERPL-MCNC: 5.8 G/DL — LOW (ref 6–8)
PROTHROM AB SERPL-ACNC: 10.1 SEC — SIGNIFICANT CHANGE UP (ref 9.95–12.87)
RBC # BLD: 4.58 M/UL — SIGNIFICANT CHANGE UP (ref 4.2–5.4)
RBC # FLD: 13.4 % — SIGNIFICANT CHANGE UP (ref 11.5–14.5)
SODIUM SERPL-SCNC: 138 MMOL/L — SIGNIFICANT CHANGE UP (ref 135–146)
WBC # BLD: 11.86 K/UL — HIGH (ref 4.8–10.8)
WBC # FLD AUTO: 11.86 K/UL — HIGH (ref 4.8–10.8)

## 2019-01-01 PROCEDURE — 99232 SBSQ HOSP IP/OBS MODERATE 35: CPT

## 2019-01-01 RX ORDER — SENNA PLUS 8.6 MG/1
2 TABLET ORAL AT BEDTIME
Qty: 0 | Refills: 0 | Status: DISCONTINUED | OUTPATIENT
Start: 2019-01-01 | End: 2019-01-10

## 2019-01-01 RX ORDER — DOCUSATE SODIUM 100 MG
100 CAPSULE ORAL THREE TIMES A DAY
Qty: 0 | Refills: 0 | Status: DISCONTINUED | OUTPATIENT
Start: 2019-01-01 | End: 2019-01-10

## 2019-01-01 RX ADMIN — NIMODIPINE 30 MILLIGRAM(S): 60 SOLUTION ORAL at 06:30

## 2019-01-01 RX ADMIN — Medication 100 MILLIGRAM(S): at 21:22

## 2019-01-01 RX ADMIN — Medication 650 MILLIGRAM(S): at 17:49

## 2019-01-01 RX ADMIN — LEVETIRACETAM 400 MILLIGRAM(S): 250 TABLET, FILM COATED ORAL at 17:19

## 2019-01-01 RX ADMIN — Medication 650 MILLIGRAM(S): at 05:00

## 2019-01-01 RX ADMIN — NIMODIPINE 30 MILLIGRAM(S): 60 SOLUTION ORAL at 16:29

## 2019-01-01 RX ADMIN — NIMODIPINE 30 MILLIGRAM(S): 60 SOLUTION ORAL at 12:31

## 2019-01-01 RX ADMIN — NIMODIPINE 30 MILLIGRAM(S): 60 SOLUTION ORAL at 21:22

## 2019-01-01 RX ADMIN — Medication 650 MILLIGRAM(S): at 23:19

## 2019-01-01 RX ADMIN — NIMODIPINE 30 MILLIGRAM(S): 60 SOLUTION ORAL at 03:00

## 2019-01-01 RX ADMIN — NIMODIPINE 30 MILLIGRAM(S): 60 SOLUTION ORAL at 09:56

## 2019-01-01 RX ADMIN — Medication 650 MILLIGRAM(S): at 05:30

## 2019-01-01 RX ADMIN — PANTOPRAZOLE SODIUM 40 MILLIGRAM(S): 20 TABLET, DELAYED RELEASE ORAL at 12:31

## 2019-01-01 RX ADMIN — LEVETIRACETAM 400 MILLIGRAM(S): 250 TABLET, FILM COATED ORAL at 05:47

## 2019-01-01 RX ADMIN — Medication 650 MILLIGRAM(S): at 17:19

## 2019-01-01 RX ADMIN — NIMODIPINE 30 MILLIGRAM(S): 60 SOLUTION ORAL at 18:50

## 2019-01-01 RX ADMIN — SENNA PLUS 2 TABLET(S): 8.6 TABLET ORAL at 21:23

## 2019-01-01 NOTE — PROGRESS NOTE ADULT - ASSESSMENT
33 year old woman  presented 2 days post partum with a history of gestational hypertension, PCOS and asthma transferred from [facility] for management of SAH. As per patient she developed severe headache on day of presentation. Headache started while patient laying down and described as sudden in onset, diffuse , 8/10 in intensity and progressively worsened since onset a/w photophobia , nausea but no vomiting. CTH at [facility] showed SAH for which patient was transferred to [facility] for possible intervention. CTA of the head failed to reveal vascular abnormality. S/p diagnostic cerebral angiogram as of yesterday. Patient continues to be in ICU  No events overnight, she reports Improvement in headache intensity     Plan  continue Tylenol ATC / prn for headache  continue mg drip  Continue Keppra 750mg bid  Maintain Seizure precautions.  Keep in reverse trendelenberg.  Keep systolic blood pressure 100-120.  Continue nimodipine   neuro checks q 1 hr with vital signs  check right groin angiogram site simultaneously with neuro checks  Call for acute change in neuro status

## 2019-01-01 NOTE — PROGRESS NOTE ADULT - SUBJECTIVE AND OBJECTIVE BOX
SUBJECTIVE:    Patient is a 33y old Female who presents with a chief complaint of SAH , transfer from [facility] (01 Jan 2019 05:08)    Stable, no acute events overnight. Mild to moderate headache this AM, photophobia improving. Otherwise no other complaints.    PAST MEDICAL & SURGICAL HISTORY  Gestational hypertension  Asthma  PCOS (polycystic ovarian syndrome)       ALLERGIES:  Alcohol (Flushing)  Tamiflu (Unknown)    MEDICATIONS:  STANDING MEDICATIONS  levETIRAcetam  IVPB 750 milliGRAM(s) IV Intermittent every 12 hours  magnesium sulfate Infusion 0.5 Gm/Hr IV Continuous <Continuous>  niCARdipine Infusion 5 mG/Hr IV Continuous <Continuous>  niMODipine 30 milliGRAM(s) Oral every 3 hours  pantoprazole  Injectable 40 milliGRAM(s) IV Push daily  sodium chloride 0.9%. 1000 milliLiter(s) IV Continuous <Continuous>    PRN MEDICATIONS  acetaminophen   Tablet .. 650 milliGRAM(s) Oral every 6 hours PRN  ondansetron Injectable 4 milliGRAM(s) IV Push every 4 hours PRN    VITALS:   T(F): 97.1  HR: 102  BP: 138/70  RR: 24  SpO2: 98%    LABS:                        12.9   11.86 )-----------( 262      ( 01 Jan 2019 05:49 )             39.6     01-01    138  |  101  |  14  ----------------------------<  102<H>  3.8   |  21  |  0.7    Ca    8.1<L>      01 Jan 2019 05:49  Phos  3.8     01-01  Mg     3.4     01-01    TPro  5.8<L>  /  Alb  3.3<L>  /  TBili  0.4  /  DBili  x   /  AST  16  /  ALT  14  /  AlkPhos  101  01-01    PT/INR - ( 01 Jan 2019 05:49 )   PT: 10.10 sec;   INR: 0.88 ratio         PTT - ( 01 Jan 2019 05:49 )  PTT:29.1 sec  Urinalysis Basic - ( 31 Dec 2018 00:58 )    Color: Yellow / Appearance: Clear / SG: >=1.030 / pH: x  Gluc: x / Ketone: Negative  / Bili: Negative / Urobili: 0.2   Blood: x / Protein: Trace / Nitrite: Negative   Leuk Esterase: Negative / RBC: 6-10 /HPF / WBC x   Sq Epi: x / Non Sq Epi: x / Bacteria: x            CARDIAC MARKERS ( 31 Dec 2018 05:19 )  x     / <0.01 ng/mL / 130 U/L / x     / 3.7 ng/mL          12-31-18 @ 07:01  -  01-01-19 @ 07:00  --------------------------------------------------------  IN: 2720 mL / OUT: 3645 mL / NET: -925 mL    01-01-19 @ 07:01  -  01-01-19 @ 15:37  --------------------------------------------------------  IN: 580 mL / OUT: 1350 mL / NET: -770 mL    < from: MR Venogram Head w/wo IV Cont (01.01.19 @ 11:41) >  IMPRESSION:    1.  Segmental (about 2.5 cm in length) moderate-severe narrowing of the   right distal transverse sinus suspicious for venous sinus thrombosis.    2.  1.9 cm ovoid structure within the left frontotemporal region   corresponding to the appearance of a small hematoma on CT. The appearance   is suspicious for an underlying vascular malformation. Consider further   evaluation with contrast-enhanced brain MRI.    < end of copied text >    < from: Transthoracic Echocardiogram (12.31.18 @ 07:46) >  Summary:   1. Left ventricular ejection fraction, by visual estimation, is 65 to   70%.   2. Normal global left ventricular systolic function.   3. Mild mitral regurgitation.   4. Mild tricuspid regurgitation.    < end of copied text >  < from: Transthoracic Echocardiogram (12.31.18 @ 07:46) >  Summary:   1. Left ventricular ejection fraction, by visual estimation, is 65 to   70%.   2. Normal global left ventricular systolic function.   3. Mild mitral regurgitation.   4. Mild tricuspid regurgitation.    < end of copied text >      PHYSICAL EXAM:  GEN: NAD  LUNGS: CTAB, no w/r/r  HEART: RRR, no murmur auscultated  ABD: soft, non-tender +BS  EXT: no edema, PP b/l  NEURO: AAOx3, neur exam remains non-focal

## 2019-01-01 NOTE — PROGRESS NOTE ADULT - ASSESSMENT
#SAH  - had diagnostic cerebral angiogram yesterday  - MRV today: concerning for venous sinus thrombosis  - CTA head and neck negative for any large vessel occlusion or significant stenosis  - remains on nimodipine 30 q 3 hr to prevent vasospasm  - on nicardipine drip- goal -120   - keppra 750 q12  - Magnesium drip   - Tolerating solid food well   - Zofran prn for nausea  - PRN tylenol for headache  - ECHO unremarkable  - continue neuro checks q1     #dvt ppx- SCD  #full code, from home

## 2019-01-01 NOTE — PROGRESS NOTE ADULT - SUBJECTIVE AND OBJECTIVE BOX
Neurology Follow up note    Patient seen and examined at bedside , she is s/p diagnostic cerebral angiogram as of 12/31 . Right groin procedural site is soft to tough and has no surrounding swelling or bruising. She has +strong pedal pulses bilaterally. Patient reports constant diffuse headache and neck pain which has improved in intensity as compared to yesterday is now 4/10 as opposed to 7-10/10 yesterday. She denies nausea, vomiting speech visual deficits. Hemodynamically stable overnight maintaining recommended target bp . There was no bp fluctuations overnight continues to be on Cardene drip, no acute events. Photophobia has also improved significantly        Vital Signs Last 24 Hrs  T(C): 37.8 (31 Dec 2018 20:00), Max: 37.8 (31 Dec 2018 17:00)  T(F): 100.1 (31 Dec 2018 20:00), Max: 100.1 (31 Dec 2018 17:00)  HR: 100 (01 Jan 2019 01:15) (66 - 114)  BP: 122/56 (01 Jan 2019 01:15) (111/65 - 153/84)  BP(mean): 71 (01 Jan 2019 01:15) (71 - 107)  RR: 21 (01 Jan 2019 01:15) (12 - 31)  SpO2: 97% (01 Jan 2019 01:15) (96% - 100%)          Neurological Exam:   Mental status: a/o x 4 speech fluent , good repeats and normal naming  Cranial nerves: pupils 3mm ou reactive to light  , eyes midline at primary position, face symmetric , facial sensation intact, tongue is midline, has photophobia  Motor:  5/5 throughout , no drift               No abnormal involuntary movement  Sensation: Intact and symmetric  Coordination: No dysmetria on finger-to-nose and heel-to-shin.  No clumsiness.  b/l down going toes  +nuchal rigidity    Medications  acetaminophen   Tablet .. 650 milliGRAM(s) Oral every 6 hours PRN  levETIRAcetam  IVPB 750 milliGRAM(s) IV Intermittent every 12 hours  magnesium sulfate Infusion 0.5 Gm/Hr IV Continuous <Continuous>  niCARdipine Infusion 5 mG/Hr IV Continuous <Continuous>  niMODipine 30 milliGRAM(s) Oral every 3 hours  ondansetron Injectable 4 milliGRAM(s) IV Push every 4 hours PRN  pantoprazole  Injectable 40 milliGRAM(s) IV Push daily  sodium chloride 0.9%. 1000 milliLiter(s) IV Continuous <Continuous>      Lab  Comprehensive Metabolic Panel in AM (12.31.18 @ 05:19)    Sodium, Serum: 138 mmol/L    Potassium, Serum: 3.7 mmol/L    Chloride, Serum: 100 mmol/L    Carbon Dioxide, Serum: 22 mmol/L    Anion Gap, Serum: 16 mmol/L    Blood Urea Nitrogen, Serum: 4 mg/dL    Creatinine, Serum: 0.5 mg/dL    Glucose, Serum: 98 mg/dL    Calcium, Total Serum: 8.4 mg/dL    Protein Total, Serum: 5.8 g/dL    Albumin, Serum: 3.3 g/dL    Bilirubin Total, Serum: 0.3 mg/dL    Alkaline Phosphatase, Serum: 104 U/L    Aspartate Aminotransferase (AST/SGOT): 20 U/L    Alanine Aminotransferase (ALT/SGPT): 14 U/L    eGFR if Non : 127: Interpretative comment    Lipid Profile in AM (12.31.18 @ 05:19)    Total Cholesterol/HDL Ratio Measurement: 4.0 Ratio    Cholesterol, Serum: 220 mg/dL    Triglycerides, Serum: 247 mg/dL    HDL Cholesterol, Serum: 55: HDL Levels >/= 60 mg/dL are considered beneficial and a "negative" risk  factor.  Effective 08/15/2018: New reference range and interpretive comment. mg/dL    Direct LDL: 146: LDL Cholesterol (mg/dL) --- Interpretive Comment (for adults 18 and over)    Magnesium, Serum in AM (12.31.18 @ 05:19)    Magnesium, Serum: 2.3 mg/dL    Complete Blood Count + Automated Diff in AM (12.31.18 @ 05:19)    WBC Count: 10.10 K/uL    RBC Count: 4.44 M/uL    Hemoglobin: 12.5 g/dL    Hematocrit: 37.9 %    Mean Cell Volume: 85.4 fL    Mean Cell Hemoglobin: 28.2 pg    Mean Cell Hemoglobin Conc: 33.0 g/dL    Red Cell Distrib Width: 13.2 %    Platelet Count - Automated: 219 K/uL    Auto Neutrophil #: 7.19 K/uL    Auto Lymphocyte #: 1.92 K/uL    Auto Monocyte #: 0.51 K/uL    Auto Eosinophil #: 0.34 K/uL    Auto Basophil #: 0.06 K/uL    Auto Neutrophil %: 71.2: Differential percentages must be correlated with absolute numbers for  clinical significance. %    Auto Lymphocyte %: 19.0 %    Auto Monocyte %: 5.0 %    Auto Eosinophil %: 3.4 %    Auto Basophil %: 0.6 %    Auto Immature Granulocyte %: 0.8 %          Radiology

## 2019-01-01 NOTE — PROGRESS NOTE ADULT - SUBJECTIVE AND OBJECTIVE BOX
Over Night Events:        ROS:  See HPI    PHYSICAL EXAM    ICU Vital Signs Last 24 Hrs  T(C): 36.2 (01 Jan 2019 12:00), Max: 37.8 (31 Dec 2018 17:00)  T(F): 97.1 (01 Jan 2019 12:00), Max: 100.1 (31 Dec 2018 17:00)  HR: 102 (01 Jan 2019 15:15) (76 - 114)  BP: 138/70 (01 Jan 2019 15:15) (89/47 - 151/77)  BP(mean): 85 (01 Jan 2019 15:15) (53 - 102)  ABP: --  ABP(mean): --  RR: 24 (01 Jan 2019 15:15) (13 - 31)  SpO2: 98% (01 Jan 2019 15:15) (95% - 99%)      General:  HEENT:                Lymph Nodes: NO cervical LN   Lungs: Bilateral BS  Cardiovascular: Regular   Abdomen: Soft, Positive BS  Extremities: No clubbing   Skin:   Neurological:       LABS:                          12.9   11.86 )-----------( 262      ( 01 Jan 2019 05:49 )             39.6                                               01-01    138  |  101  |  14  ----------------------------<  102<H>  3.8   |  21  |  0.7    Ca    8.1<L>      01 Jan 2019 05:49  Phos  3.8     01-01  Mg     3.4     01-01    TPro  5.8<L>  /  Alb  3.3<L>  /  TBili  0.4  /  DBili  x   /  AST  16  /  ALT  14  /  AlkPhos  101  01-01      PT/INR - ( 01 Jan 2019 05:49 )   PT: 10.10 sec;   INR: 0.88 ratio         PTT - ( 01 Jan 2019 05:49 )  PTT:29.1 sec                                       Urinalysis Basic - ( 31 Dec 2018 00:58 )    Color: Yellow / Appearance: Clear / SG: >=1.030 / pH: x  Gluc: x / Ketone: Negative  / Bili: Negative / Urobili: 0.2   Blood: x / Protein: Trace / Nitrite: Negative   Leuk Esterase: Negative / RBC: 6-10 /HPF / WBC x   Sq Epi: x / Non Sq Epi: x / Bacteria: x        CARDIAC MARKERS ( 31 Dec 2018 05:19 )  x     / <0.01 ng/mL / 130 U/L / x     / 3.7 ng/mL                                            LIVER FUNCTIONS - ( 01 Jan 2019 05:49 )  Alb: 3.3 g/dL / Pro: 5.8 g/dL / ALK PHOS: 101 U/L / ALT: 14 U/L / AST: 16 U/L / GGT: x                                                                                                                                       MEDICATIONS  (STANDING):  levETIRAcetam  IVPB 750 milliGRAM(s) IV Intermittent every 12 hours  magnesium sulfate Infusion 0.5 Gm/Hr (12.5 mL/Hr) IV Continuous <Continuous>  niCARdipine Infusion 5 mG/Hr (25 mL/Hr) IV Continuous <Continuous>  niMODipine 30 milliGRAM(s) Oral every 3 hours  pantoprazole  Injectable 40 milliGRAM(s) IV Push daily  sodium chloride 0.9%. 1000 milliLiter(s) (50 mL/Hr) IV Continuous <Continuous>    MEDICATIONS  (PRN):  acetaminophen   Tablet .. 650 milliGRAM(s) Oral every 6 hours PRN Moderate Pain (4 - 6)  ondansetron Injectable 4 milliGRAM(s) IV Push every 4 hours PRN Nausea and/or Vomiting      Xrays:

## 2019-01-02 LAB
ALBUMIN SERPL ELPH-MCNC: 3.2 G/DL — LOW (ref 3.5–5.2)
ALBUMIN SERPL ELPH-MCNC: 3.4 G/DL — LOW (ref 3.5–5.2)
ALP SERPL-CCNC: 107 U/L — SIGNIFICANT CHANGE UP (ref 30–115)
ALP SERPL-CCNC: 96 U/L — SIGNIFICANT CHANGE UP (ref 30–115)
ALT FLD-CCNC: 14 U/L — SIGNIFICANT CHANGE UP (ref 0–41)
ALT FLD-CCNC: 17 U/L — SIGNIFICANT CHANGE UP (ref 0–41)
ANION GAP SERPL CALC-SCNC: 15 MMOL/L — HIGH (ref 7–14)
ANION GAP SERPL CALC-SCNC: 18 MMOL/L — HIGH (ref 7–14)
APPEARANCE UR: ABNORMAL
APTT BLD: 31.4 SEC — SIGNIFICANT CHANGE UP (ref 27–39.2)
AST SERPL-CCNC: 16 U/L — SIGNIFICANT CHANGE UP (ref 0–41)
AST SERPL-CCNC: 39 U/L — SIGNIFICANT CHANGE UP (ref 0–41)
BACTERIA # UR AUTO: ABNORMAL /HPF
BASOPHILS # BLD AUTO: 0.07 K/UL — SIGNIFICANT CHANGE UP (ref 0–0.2)
BASOPHILS NFR BLD AUTO: 0.7 % — SIGNIFICANT CHANGE UP (ref 0–1)
BILIRUB SERPL-MCNC: 0.4 MG/DL — SIGNIFICANT CHANGE UP (ref 0.2–1.2)
BILIRUB SERPL-MCNC: 0.4 MG/DL — SIGNIFICANT CHANGE UP (ref 0.2–1.2)
BILIRUB UR-MCNC: NEGATIVE — SIGNIFICANT CHANGE UP
BUN SERPL-MCNC: 15 MG/DL — SIGNIFICANT CHANGE UP (ref 10–20)
BUN SERPL-MCNC: 19 MG/DL — SIGNIFICANT CHANGE UP (ref 10–20)
CALCIUM SERPL-MCNC: 7.8 MG/DL — LOW (ref 8.5–10.1)
CALCIUM SERPL-MCNC: 8.2 MG/DL — LOW (ref 8.5–10.1)
CHLORIDE SERPL-SCNC: 104 MMOL/L — SIGNIFICANT CHANGE UP (ref 98–110)
CHLORIDE SERPL-SCNC: 99 MMOL/L — SIGNIFICANT CHANGE UP (ref 98–110)
CHLORIDE UR-SCNC: 88 — SIGNIFICANT CHANGE UP
CO2 SERPL-SCNC: 19 MMOL/L — SIGNIFICANT CHANGE UP (ref 17–32)
CO2 SERPL-SCNC: 20 MMOL/L — SIGNIFICANT CHANGE UP (ref 17–32)
COLOR SPEC: YELLOW — SIGNIFICANT CHANGE UP
CREAT SERPL-MCNC: 0.6 MG/DL — LOW (ref 0.7–1.5)
CREAT SERPL-MCNC: 0.7 MG/DL — SIGNIFICANT CHANGE UP (ref 0.7–1.5)
DIFF PNL FLD: ABNORMAL
EOSINOPHIL # BLD AUTO: 0.62 K/UL — SIGNIFICANT CHANGE UP (ref 0–0.7)
EOSINOPHIL NFR BLD AUTO: 6.3 % — SIGNIFICANT CHANGE UP (ref 0–8)
EPI CELLS # UR: ABNORMAL /HPF
GLUCOSE SERPL-MCNC: 106 MG/DL — HIGH (ref 70–99)
GLUCOSE SERPL-MCNC: 94 MG/DL — SIGNIFICANT CHANGE UP (ref 70–99)
GLUCOSE UR QL: NEGATIVE MG/DL — SIGNIFICANT CHANGE UP
HCT VFR BLD CALC: 37.6 % — SIGNIFICANT CHANGE UP (ref 37–47)
HCT VFR BLD CALC: 39.4 % — SIGNIFICANT CHANGE UP (ref 37–47)
HGB BLD-MCNC: 12.5 G/DL — SIGNIFICANT CHANGE UP (ref 12–16)
HGB BLD-MCNC: 13 G/DL — SIGNIFICANT CHANGE UP (ref 12–16)
IMM GRANULOCYTES NFR BLD AUTO: 1.7 % — HIGH (ref 0.1–0.3)
INR BLD: 0.86 RATIO — SIGNIFICANT CHANGE UP (ref 0.65–1.3)
KETONES UR-MCNC: NEGATIVE — SIGNIFICANT CHANGE UP
LEUKOCYTE ESTERASE UR-ACNC: ABNORMAL
LYMPHOCYTES # BLD AUTO: 2.37 K/UL — SIGNIFICANT CHANGE UP (ref 1.2–3.4)
LYMPHOCYTES # BLD AUTO: 23.9 % — SIGNIFICANT CHANGE UP (ref 20.5–51.1)
MAGNESIUM SERPL-MCNC: 3 MG/DL — HIGH (ref 1.8–2.4)
MAGNESIUM SERPL-MCNC: 3.5 MG/DL — CRITICAL HIGH (ref 1.8–2.4)
MCHC RBC-ENTMCNC: 28.4 PG — SIGNIFICANT CHANGE UP (ref 27–31)
MCHC RBC-ENTMCNC: 28.7 PG — SIGNIFICANT CHANGE UP (ref 27–31)
MCHC RBC-ENTMCNC: 33 G/DL — SIGNIFICANT CHANGE UP (ref 32–37)
MCHC RBC-ENTMCNC: 33.2 G/DL — SIGNIFICANT CHANGE UP (ref 32–37)
MCV RBC AUTO: 86.2 FL — SIGNIFICANT CHANGE UP (ref 81–99)
MCV RBC AUTO: 86.4 FL — SIGNIFICANT CHANGE UP (ref 81–99)
MONOCYTES # BLD AUTO: 0.77 K/UL — HIGH (ref 0.1–0.6)
MONOCYTES NFR BLD AUTO: 7.8 % — SIGNIFICANT CHANGE UP (ref 1.7–9.3)
NEUTROPHILS # BLD AUTO: 5.9 K/UL — SIGNIFICANT CHANGE UP (ref 1.4–6.5)
NEUTROPHILS NFR BLD AUTO: 59.6 % — SIGNIFICANT CHANGE UP (ref 42.2–75.2)
NITRITE UR-MCNC: NEGATIVE — SIGNIFICANT CHANGE UP
NRBC # BLD: 0 /100 WBCS — SIGNIFICANT CHANGE UP (ref 0–0)
NRBC # BLD: 0 /100 WBCS — SIGNIFICANT CHANGE UP (ref 0–0)
PH UR: 6 — SIGNIFICANT CHANGE UP (ref 5–8)
PHOSPHATE SERPL-MCNC: 3.6 MG/DL — SIGNIFICANT CHANGE UP (ref 2.1–4.9)
PHOSPHATE SERPL-MCNC: 4 MG/DL — SIGNIFICANT CHANGE UP (ref 2.1–4.9)
PLATELET # BLD AUTO: 272 K/UL — SIGNIFICANT CHANGE UP (ref 130–400)
PLATELET # BLD AUTO: 300 K/UL — SIGNIFICANT CHANGE UP (ref 130–400)
POTASSIUM SERPL-MCNC: 3.8 MMOL/L — SIGNIFICANT CHANGE UP (ref 3.5–5)
POTASSIUM SERPL-MCNC: 5.4 MMOL/L — HIGH (ref 3.5–5)
POTASSIUM SERPL-SCNC: 3.8 MMOL/L — SIGNIFICANT CHANGE UP (ref 3.5–5)
POTASSIUM SERPL-SCNC: 5.4 MMOL/L — HIGH (ref 3.5–5)
PROT SERPL-MCNC: 6.1 G/DL — SIGNIFICANT CHANGE UP (ref 6–8)
PROT SERPL-MCNC: 6.1 G/DL — SIGNIFICANT CHANGE UP (ref 6–8)
PROT UR-MCNC: 30 MG/DL
PROTHROM AB SERPL-ACNC: 9.9 SEC — LOW (ref 9.95–12.87)
RBC # BLD: 4.35 M/UL — SIGNIFICANT CHANGE UP (ref 4.2–5.4)
RBC # BLD: 4.57 M/UL — SIGNIFICANT CHANGE UP (ref 4.2–5.4)
RBC # FLD: 13.2 % — SIGNIFICANT CHANGE UP (ref 11.5–14.5)
RBC # FLD: 13.2 % — SIGNIFICANT CHANGE UP (ref 11.5–14.5)
RBC CASTS # UR COMP ASSIST: >50 /HPF
SODIUM SERPL-SCNC: 136 MMOL/L — SIGNIFICANT CHANGE UP (ref 135–146)
SODIUM SERPL-SCNC: 139 MMOL/L — SIGNIFICANT CHANGE UP (ref 135–146)
SP GR SPEC: 1.02 — SIGNIFICANT CHANGE UP (ref 1.01–1.03)
URATE SERPL-MCNC: 3.4 MG/DL — SIGNIFICANT CHANGE UP (ref 2.5–7)
UROBILINOGEN FLD QL: 0.2 MG/DL — SIGNIFICANT CHANGE UP (ref 0.2–0.2)
WBC # BLD: 10.95 K/UL — HIGH (ref 4.8–10.8)
WBC # BLD: 9.9 K/UL — SIGNIFICANT CHANGE UP (ref 4.8–10.8)
WBC # FLD AUTO: 10.95 K/UL — HIGH (ref 4.8–10.8)
WBC # FLD AUTO: 9.9 K/UL — SIGNIFICANT CHANGE UP (ref 4.8–10.8)
WBC UR QL: ABNORMAL /HPF

## 2019-01-02 PROCEDURE — ZZZZZ: CPT

## 2019-01-02 RX ORDER — MORPHINE SULFATE 50 MG/1
2 CAPSULE, EXTENDED RELEASE ORAL ONCE
Qty: 0 | Refills: 0 | Status: DISCONTINUED | OUTPATIENT
Start: 2019-01-02 | End: 2019-01-02

## 2019-01-02 RX ORDER — LABETALOL HCL 100 MG
200 TABLET ORAL EVERY 8 HOURS
Qty: 0 | Refills: 0 | Status: DISCONTINUED | OUTPATIENT
Start: 2019-01-02 | End: 2019-01-03

## 2019-01-02 RX ORDER — PANTOPRAZOLE SODIUM 20 MG/1
40 TABLET, DELAYED RELEASE ORAL
Qty: 0 | Refills: 0 | Status: DISCONTINUED | OUTPATIENT
Start: 2019-01-02 | End: 2019-01-10

## 2019-01-02 RX ORDER — MORPHINE SULFATE 50 MG/1
2 CAPSULE, EXTENDED RELEASE ORAL EVERY 4 HOURS
Qty: 0 | Refills: 0 | Status: DISCONTINUED | OUTPATIENT
Start: 2019-01-02 | End: 2019-01-03

## 2019-01-02 RX ADMIN — NIMODIPINE 30 MILLIGRAM(S): 60 SOLUTION ORAL at 11:46

## 2019-01-02 RX ADMIN — Medication 650 MILLIGRAM(S): at 23:57

## 2019-01-02 RX ADMIN — NICARDIPINE HYDROCHLORIDE 25 MG/HR: 30 CAPSULE, EXTENDED RELEASE ORAL at 13:23

## 2019-01-02 RX ADMIN — LEVETIRACETAM 400 MILLIGRAM(S): 250 TABLET, FILM COATED ORAL at 06:17

## 2019-01-02 RX ADMIN — Medication 650 MILLIGRAM(S): at 00:00

## 2019-01-02 RX ADMIN — NIMODIPINE 30 MILLIGRAM(S): 60 SOLUTION ORAL at 15:12

## 2019-01-02 RX ADMIN — Medication 650 MILLIGRAM(S): at 05:19

## 2019-01-02 RX ADMIN — MORPHINE SULFATE 2 MILLIGRAM(S): 50 CAPSULE, EXTENDED RELEASE ORAL at 01:08

## 2019-01-02 RX ADMIN — MORPHINE SULFATE 2 MILLIGRAM(S): 50 CAPSULE, EXTENDED RELEASE ORAL at 10:58

## 2019-01-02 RX ADMIN — Medication 650 MILLIGRAM(S): at 18:08

## 2019-01-02 RX ADMIN — ONDANSETRON 4 MILLIGRAM(S): 8 TABLET, FILM COATED ORAL at 09:07

## 2019-01-02 RX ADMIN — PANTOPRAZOLE SODIUM 40 MILLIGRAM(S): 20 TABLET, DELAYED RELEASE ORAL at 11:46

## 2019-01-02 RX ADMIN — Medication 650 MILLIGRAM(S): at 05:50

## 2019-01-02 RX ADMIN — SODIUM CHLORIDE 50 MILLILITER(S): 9 INJECTION INTRAMUSCULAR; INTRAVENOUS; SUBCUTANEOUS at 13:23

## 2019-01-02 RX ADMIN — Medication 100 MILLIGRAM(S): at 05:20

## 2019-01-02 RX ADMIN — NIMODIPINE 30 MILLIGRAM(S): 60 SOLUTION ORAL at 05:19

## 2019-01-02 RX ADMIN — LEVETIRACETAM 400 MILLIGRAM(S): 250 TABLET, FILM COATED ORAL at 17:45

## 2019-01-02 RX ADMIN — Medication 650 MILLIGRAM(S): at 23:42

## 2019-01-02 RX ADMIN — NIMODIPINE 30 MILLIGRAM(S): 60 SOLUTION ORAL at 23:30

## 2019-01-02 RX ADMIN — Medication 650 MILLIGRAM(S): at 13:23

## 2019-01-02 RX ADMIN — NIMODIPINE 30 MILLIGRAM(S): 60 SOLUTION ORAL at 21:54

## 2019-01-02 RX ADMIN — NIMODIPINE 30 MILLIGRAM(S): 60 SOLUTION ORAL at 08:44

## 2019-01-02 RX ADMIN — Medication 100 MILLIGRAM(S): at 13:24

## 2019-01-02 RX ADMIN — MORPHINE SULFATE 2 MILLIGRAM(S): 50 CAPSULE, EXTENDED RELEASE ORAL at 09:07

## 2019-01-02 RX ADMIN — Medication 200 MILLIGRAM(S): at 23:29

## 2019-01-02 RX ADMIN — NIMODIPINE 30 MILLIGRAM(S): 60 SOLUTION ORAL at 17:39

## 2019-01-02 RX ADMIN — Medication 650 MILLIGRAM(S): at 11:45

## 2019-01-02 RX ADMIN — Medication 12.5 GM/HR: at 13:23

## 2019-01-02 RX ADMIN — SENNA PLUS 2 TABLET(S): 8.6 TABLET ORAL at 21:55

## 2019-01-02 RX ADMIN — NIMODIPINE 30 MILLIGRAM(S): 60 SOLUTION ORAL at 00:13

## 2019-01-02 RX ADMIN — NIMODIPINE 30 MILLIGRAM(S): 60 SOLUTION ORAL at 03:06

## 2019-01-02 RX ADMIN — Medication 650 MILLIGRAM(S): at 17:39

## 2019-01-02 RX ADMIN — Medication 100 MILLIGRAM(S): at 21:55

## 2019-01-02 RX ADMIN — MORPHINE SULFATE 2 MILLIGRAM(S): 50 CAPSULE, EXTENDED RELEASE ORAL at 01:23

## 2019-01-02 NOTE — PROGRESS NOTE ADULT - SUBJECTIVE AND OBJECTIVE BOX
Neurology Follow up note      Name  BALDEV KEE    HPI:  32 y/o female  who presents 2 days post partum with hx of gestational hypertension, PCOS and asthma transferred from [facility] for management of SAH. As per patient she developed severe headache since 3:30pm on day of presentation. Headache started while patient laying down and described as sudden in onset , diffuse , 8/10 in intensity and progressively worsening since afternoon a/w photophobia , nausea but no vomiting. Headache worsens with head movements. CTH at [facility] showed SAH for which the neurosurgeon at [facility] Dr. Calderon discussed case with dr fontanez and patient was transferred to [facility] for possible intervention like coiling in am .   No history of similar events in past.      Interval History - CTA shows no evidence of Aneurysm. MRV done shows Right distal transverse moderate narrowing c/w sinus thrombosis/ Left frontaltemporal oviod structure? AVM..          Vital Signs Last 24 Hrs  T(C): 36.5 (2019 20:00), Max: 37.1 (2019 16:00)  T(F): 97.7 (2019 20:00), Max: 98.7 (2019 16:00)  HR: 100 (2019 01:00) (76 - 108)  BP: 128/64 (2019 01:00) (89/47 - 144/64)  BP(mean): 85 (2019 01:00) (53 - 102)  RR: 25 (2019 01:00) (13 - 36)  SpO2: 97% (2019 01:00) (95% - 100%)          Neurological Exam:   Mental status: Awakens to voice   PERRLA EOMI No facial noted  ZARCO X 4 5/5   No drift  Sensory: grossly intact            Medications  acetaminophen   Tablet .. 650 milliGRAM(s) Oral every 6 hours PRN  docusate sodium 100 milliGRAM(s) Oral three times a day  levETIRAcetam  IVPB 750 milliGRAM(s) IV Intermittent every 12 hours  magnesium sulfate Infusion 0.5 Gm/Hr IV Continuous <Continuous>  niCARdipine Infusion 5 mG/Hr IV Continuous <Continuous>  niMODipine 30 milliGRAM(s) Oral every 3 hours  ondansetron Injectable 4 milliGRAM(s) IV Push every 4 hours PRN  pantoprazole  Injectable 40 milliGRAM(s) IV Push daily  senna 2 Tablet(s) Oral at bedtime  sodium chloride 0.9%. 1000 milliLiter(s) IV Continuous <Continuous>      Lab      Radiology

## 2019-01-02 NOTE — PROGRESS NOTE ADULT - SUBJECTIVE AND OBJECTIVE BOX
Patient is a 33y old  Female who presents with a chief complaint of SAH , transfer from [facility] (02 Jan 2019 02:09)        SUBJECTIVE:    No events.   On nicardipine infusion.  Complains of headache.    REVIEW OF SYSTEMS:  See HPI    PHYSICAL EXAM  Vital Signs Last 24 Hrs  T(C): 36.1 (02 Jan 2019 08:00), Max: 37.1 (01 Jan 2019 16:00)  T(F): 97 (02 Jan 2019 08:00), Max: 98.7 (01 Jan 2019 16:00)  HR: 102 (02 Jan 2019 09:00) (84 - 108)  BP: 133/60 (02 Jan 2019 09:00) (92/43 - 144/64)  BP(mean): 75 (02 Jan 2019 09:00) (58 - 125)  RR: 17 (02 Jan 2019 09:00) (14 - 36)  SpO2: 97% (02 Jan 2019 09:00) (93% - 100%)    General: In NAD  HEENT: GISSELLE               Lymphatic system: No Cervical LN    Respiratory: Juanpablo BS, clear  Cardiovascular: Regular  Gastrointestinal: Soft. + BS  Musculoskeletal: No clubbing.  moves all extremities.  Full range of motion    Skin: Warm.  Intact  Neurological: No motor or sensory deficit      01-01-19 @ 07:01  -  01-02-19 @ 07:00  --------------------------------------------------------  IN:    IV PiggyBack: 100 mL    magnesium sulfate  Infusion: 300 mL    niCARdipine Infusion: 835 mL    Oral Fluid: 480 mL    sodium chloride 0.9%.: 1200 mL  Total IN: 2915 mL    OUT:    Indwelling Catheter - Urethral: 1100 mL    Voided: 1625 mL  Total OUT: 2725 mL    Total NET: 190 mL      01-02-19 @ 07:01  -  01-02-19 @ 10:11  --------------------------------------------------------  IN:    magnesium sulfate  Infusion: 25 mL    niCARdipine Infusion: 150 mL    sodium chloride 0.9%.: 100 mL  Total IN: 275 mL    OUT:  Total OUT: 0 mL    Total NET: 275 mL          LABS:                          13.0   9.90  )-----------( 300      ( 02 Jan 2019 05:16 )             39.4                                               01-02    139  |  104  |  15  ----------------------------<  106<H>  3.8   |  20  |  0.6<L>    Ca    8.2<L>      02 Jan 2019 05:16  Phos  3.6     01-02  Mg     3.0     01-02    TPro  6.1  /  Alb  3.2<L>  /  TBili  0.4  /  DBili  x   /  AST  16  /  ALT  14  /  AlkPhos  107  01-02      PT/INR - ( 02 Jan 2019 05:16 )   PT: 9.90 sec;   INR: 0.86 ratio         PTT - ( 02 Jan 2019 05:16 )  PTT:31.4 sec                                                                                     LIVER FUNCTIONS - ( 02 Jan 2019 05:16 )  Alb: 3.2 g/dL / Pro: 6.1 g/dL / ALK PHOS: 107 U/L / ALT: 14 U/L / AST: 16 U/L / GGT: x                                                                                                MEDICATIONS  (STANDING):  docusate sodium 100 milliGRAM(s) Oral three times a day  levETIRAcetam  IVPB 750 milliGRAM(s) IV Intermittent every 12 hours  magnesium sulfate Infusion 0.5 Gm/Hr (12.5 mL/Hr) IV Continuous <Continuous>  niCARdipine Infusion 5 mG/Hr (25 mL/Hr) IV Continuous <Continuous>  niMODipine 30 milliGRAM(s) Oral every 3 hours  pantoprazole  Injectable 40 milliGRAM(s) IV Push daily  senna 2 Tablet(s) Oral at bedtime  sodium chloride 0.9%. 1000 milliLiter(s) (50 mL/Hr) IV Continuous <Continuous>    MEDICATIONS  (PRN):  acetaminophen   Tablet .. 650 milliGRAM(s) Oral every 6 hours PRN Moderate Pain (4 - 6)  morphine  - Injectable 2 milliGRAM(s) IV Push every 4 hours PRN Severe Pain (7 - 10)  ondansetron Injectable 4 milliGRAM(s) IV Push every 4 hours PRN Nausea and/or Vomiting

## 2019-01-02 NOTE — PROGRESS NOTE ADULT - SUBJECTIVE AND OBJECTIVE BOX
Subjective: 33yFemale with a pmhx of SAH  ^SAH  Family history of hypertension (Mother)  Family history of high cholesterol (Father)  Handoff  Gestational hypertension  Asthma  PCOS (polycystic ovarian syndrome)     HD#  4   SAH non traumatic  Pt seen and examined at bedside with Dr Holder.  Pt states she's feeling ok except slight headache.   No complaints of visual disturbance or dizziness.     Allergies    Alcohol (Flushing)  Tamiflu (Unknown)    Intolerances        Vital Signs Last 24 Hrs  T(C): 36.1 (02 Jan 2019 08:00), Max: 37.1 (01 Jan 2019 16:00)  T(F): 97 (02 Jan 2019 08:00), Max: 98.7 (01 Jan 2019 16:00)  HR: 102 (02 Jan 2019 09:00) (84 - 108)  BP: 133/60 (02 Jan 2019 09:00) (92/43 - 144/64)  BP(mean): 75 (02 Jan 2019 09:00) (58 - 125)  RR: 17 (02 Jan 2019 09:00) (14 - 36)  SpO2: 97% (02 Jan 2019 09:00) (93% - 100%)      acetaminophen   Tablet .. 650 milliGRAM(s) Oral every 6 hours PRN  docusate sodium 100 milliGRAM(s) Oral three times a day  levETIRAcetam  IVPB 750 milliGRAM(s) IV Intermittent every 12 hours  magnesium sulfate Infusion 0.5 Gm/Hr IV Continuous <Continuous>  morphine  - Injectable 2 milliGRAM(s) IV Push every 4 hours PRN  niCARdipine Infusion 5 mG/Hr IV Continuous <Continuous>  niMODipine 30 milliGRAM(s) Oral every 3 hours  ondansetron Injectable 4 milliGRAM(s) IV Push every 4 hours PRN  pantoprazole  Injectable 40 milliGRAM(s) IV Push daily  senna 2 Tablet(s) Oral at bedtime  sodium chloride 0.9%. 1000 milliLiter(s) IV Continuous <Continuous>        01-01-19 @ 07:01  -  01-02-19 @ 07:00  --------------------------------------------------------  IN: 2915 mL / OUT: 2725 mL / NET: 190 mL    01-02-19 @ 07:01  -  01-02-19 @ 10:50  --------------------------------------------------------  IN: 275 mL / OUT: 0 mL / NET: 275 mL          Exam:  AAOX3. Verbal function intact  facial motions symmetric  PERRL  Motor: MAEx4  Sensation: intact         CBC Full  -  ( 02 Jan 2019 05:16 )  WBC Count : 9.90 K/uL  Hemoglobin : 13.0 g/dL  Hematocrit : 39.4 %  Platelet Count - Automated : 300 K/uL  Mean Cell Volume : 86.2 fL  Mean Cell Hemoglobin : 28.4 pg  Mean Cell Hemoglobin Concentration : 33.0 g/dL  Auto Neutrophil # : 5.90 K/uL  Auto Lymphocyte # : 2.37 K/uL  Auto Monocyte # : 0.77 K/uL  Auto Eosinophil # : 0.62 K/uL  Auto Basophil # : 0.07 K/uL  Auto Neutrophil % : 59.6 %  Auto Lymphocyte % : 23.9 %  Auto Monocyte % : 7.8 %  Auto Eosinophil % : 6.3 %  Auto Basophil % : 0.7 %    01-02    139  |  104  |  15  ----------------------------<  106<H>  3.8   |  20  |  0.6<L>    Ca    8.2<L>      02 Jan 2019 05:16  Phos  3.6     01-02  Mg     3.0     01-02    TPro  6.1  /  Alb  3.2<L>  /  TBili  0.4  /  DBili  x   /  AST  16  /  ALT  14  /  AlkPhos  107  01-02    PT/INR - ( 02 Jan 2019 05:16 )   PT: 9.90 sec;   INR: 0.86 ratio         PTT - ( 02 Jan 2019 05:16 )  PTT:31.4 sec          Imaging:     CTA shows no evidence of Aneurysm.   MRV done shows Right distal transverse moderate narrowing c/w sinus thrombosis/ Left frontaltemporal oviod structure? AVM..      Assessment: as above  Plan:  continue close neuro checks q 1hr  wean Cardene as appropriate   MRI today per Neurology  as per Dr Gamino, recommend repeat angiogram in 1 week  discussed w attg

## 2019-01-02 NOTE — PROGRESS NOTE ADULT - ASSESSMENT
IMp: 34 y/o F post partum with SAH r/o AVM vs SINus thrombosis VS Aneurysm        Plan: Please continue to Keep -120      Continue with Cardene titration and Nimotop q 3-4H       Frequent Neuro Checks q 1H       MRI of Brain today   Cont current care IMp: 34 y/o F post partum with SAH r/o AVM vs SINus thrombosis VS Aneurysm        Plan: Please continue to Keep -120      Continue with Cardene titration and Nimodipin q 3-4H       Frequent Neuro Checks q 1H       MRI of Brain today   Cont current care

## 2019-01-02 NOTE — PROGRESS NOTE ADULT - SUBJECTIVE AND OBJECTIVE BOX
________________________________________________________________________________  DAILY PROGRESS NOTE:    ================== SUBJECTIVE ==================    BALDEV KEE  /   33y  /  Female  /  MRN#: [id number]  Patient is a 33y old Female who presents with a chief complaint of SAH , transfer from [facility] (2019 10:50)  Currently admitted to medicine with the primary diagnosis of     HOSPITAL DAY: 3d     ICU DAY: 3d    SUMMARY OF HOSPITAL COURSE TO DATE     32 y/o female  w/ PMH of Gestational HTN, PCOS and asthma transferred from [facility] for management of SAH. As per patient she developed severe headache since 3:30pm on day of presentation. Headache started while patient laying down and described as sudden in onset , diffuse , 8/10 in intensity and progressively worsening since afternoon a/w photophobia , nausea but no vomiting. Headache worsens with head movements. CTH at [facility] showed SAH for which the neurosurgeon at [facility] Dr. Calderon discussed case with dr fontanez and patient was transferred to [facility] for possible intervention.     At [facility], pt underwent cerebral angiogram. Pt underwent MRV after and was found to have ?venous sinus thrombosis on MRV. As per neurology, it can be secondary to artifact. Neurology wanted a repeat MRI with IV contrast.     Today is ICU day 3, pt still has significant headache which improved compared to admission. Pt is currently awaiting for MRI with IV contrast.        OVERNIGHT EVENTS:     TODAY: Patient was seen this morning at bedside. Currently, the patient reports headache but no other complaints.     Review of systems is otherwise negative.    ================= PRESENT TODAY ==================    1-Damon Catheter: no  2-Central Line: no  3-IV Fluids: NS @ 50  4-Drips: Mg @ 0.5mg/hr, Nicardipine @ 5mg/hr    =================== OBJECTIVE ===================    VITAL SIGNS: Last 24 Hours  T(C): 36.7 (2019 12:00), Max: 37.1 (2019 16:00)  T(F): 98 (2019 12:00), Max: 98.7 (2019 16:00)  HR: 102 (2019 12:00) (84 - 108)  BP: 134/58 (2019 12:00) (92/43 - 144/64)  BP(mean): 76 (2019 12:00) (58 - 125)  RR: 18 (2019 12:00) (14 - 36)  SpO2: 96% (2019 12:00) (93% - 100%)    19 @ 07:19 @ 07:00  --------------------------------------------------------  IN: 2915 mL / OUT: 2725 mL / NET: 190 mL    19 @ 07:01  19 @ 13:17  --------------------------------------------------------  IN: 967.5 mL / OUT: 0 mL / NET: 967.5 mL      PHYSICAL EXAM:  GEN: NAD  LUNGS: CTAB, no w/r/r  HEART: RRR, no murmur auscultated  ABD: soft, non-tender +BS  EXT: no edema, PP b/l  NEURO: AAOx3, neur exam remains non-focal    ===================== LABS =====================                        13.0   9.90  )-----------( 300      ( 2019 05:16 )             39.4         139  |  104  |  15  ----------------------------<  106<H>  3.8   |  20  |  0.6<L>    Ca    8.2<L>      2019 05:16  Phos  3.6       Mg     3.0         TPro  6.1  /  Alb  3.2<L>  /  TBili  0.4  /  DBili  x   /  AST  16  /  ALT  14  /  AlkPhos  107      PT/INR - ( 2019 05:16 )   PT: 9.90 sec;   INR: 0.86 ratio         PTT - ( 2019 05:16 )  PTT:31.4 sec        ================= MICROBIOLOGY ================    ================== IMAGING TO DATE ==================  < from: CT Angio Neck w/ IV Cont (18 @ 23:18) >  No large vessel occlusion or significant stenosis of the vessels of the head and neck.  No definite vascular malformation is identified.  < end of copied text >    < from: CT Angio Head w/ IV Cont (18 @ 23:18) >  No large vessel occlusion or significant stenosis of the vessels of the head and neck.  No definite vascular malformation is identified.  < end of copied text >      < from: CT Head No Cont (18 @ 22:56) >  Small subarachnoid hemorrhage centered in the left frontotemporal region.  < end of copied text >    < from: Transthoracic Echocardiogram (18 @ 07:46) >  Summary:   1. Left ventricular ejection fraction, by visual estimation, is 65 to   70%.   2. Normal global left ventricular systolic function.   3. Mild mitral regurgitation.   4. Mild tricuspid regurgitation.  < end of copied text >    < from: MR Venogram Head w/wo IV Cont (19 @ 11:41) >  IMPRESSION:  1.  Segmental (about 2.5 cm in length) moderate-severe narrowing of the   right distal transverse sinus suspicious for venous sinus thrombosis.    2.  1.9 cm ovoid structure within the left frontotemporal region   corresponding to the appearance of a small hematoma on CT. The appearance   is suspicious for an underlying vascular malformation. Consider further   evaluation with contrast-enhanced brain MRI.  < end of copied text >        ================== OTHER SIGNIFICANT FINDINGS ==================    ================== ALLERGIES ===================  Alcohol (Flushing)  Tamiflu (Unknown)    ==================== MEDS =====================  docusate sodium 100 milliGRAM(s) Oral three times a day  levETIRAcetam  IVPB 750 milliGRAM(s) IV Intermittent every 12 hours  magnesium sulfate Infusion 0.5 Gm/Hr IV Continuous <Continuous>  niCARdipine Infusion 5 mG/Hr IV Continuous <Continuous>  niMODipine 30 milliGRAM(s) Oral every 3 hours  pantoprazole  Injectable 40 milliGRAM(s) IV Push daily  senna 2 Tablet(s) Oral at bedtime  sodium chloride 0.9%. 1000 milliLiter(s) IV Continuous <Continuous>    PRN MEDICATIONS  acetaminophen   Tablet .. 650 milliGRAM(s) Oral every 6 hours PRN  morphine  - Injectable 2 milliGRAM(s) IV Push every 4 hours PRN  ondansetron Injectable 4 milliGRAM(s) IV Push every 4 hours PRN      ================= CONSULTS ==================    Neurosx (1/2)  Plan:  continue close neuro checks q 1hr  wean Cardene as appropriate   MRI today per Neurology  as per Dr Gamino, recommend repeat angiogram in 1 week  discussed w attg    Neuro (1/2)  Plan: Please continue to Keep -120      Continue with Cardene titration and Nimotop q 3-4H       Frequent Neuro Checks q 1H       MRI of Brain today   Cont current care    ================= ASSESS/PLAN ==================  Patient is a 33y old Female who presents with a chief complaint of SAH , transfer from [facility] (2019 10:50)  Currently admitted to medicine with the primary diagnosis of SAH. ________________________________________________________________________________  DAILY PROGRESS NOTE:    ================== SUBJECTIVE ==================    BALDEV KEE  /   33y  /  Female  /  MRN#: [id number]  Patient is a 33y old Female who presents with a chief complaint of SAH , transfer from [facility] (2019 10:50)  Currently admitted to medicine with the primary diagnosis of SAH.     HOSPITAL DAY: 3d     ICU DAY: 3d    SUMMARY OF HOSPITAL COURSE TO DATE     32 y/o female  w/ PMH of Gestational HTN, PCOS and asthma transferred from [facility] for management of SAH. As per patient she developed severe headache since 3:30pm on day of presentation. Headache started while patient laying down and described as sudden in onset , diffuse , 8/10 in intensity and progressively worsening since afternoon a/w photophobia , nausea but no vomiting. Headache worsens with head movements. CTH at [facility] showed SAH for which the neurosurgeon at [facility] Dr. Calderon discussed case with dr fontanez and patient was transferred to [facility] for possible intervention.     At [facility], pt underwent cerebral angiogram. Pt underwent MRV after and was found to have ?venous sinus thrombosis on MRV. As per neurology, it can be secondary to artifact. Neurology wanted a repeat MRI with IV contrast.     Today is ICU day 3, pt still has significant headache which improved compared to admission. Pt is currently awaiting for MRI with IV contrast.        OVERNIGHT EVENTS:     TODAY: Patient was seen this morning at bedside. Currently, the patient reports headache but no other complaints.     Review of systems is otherwise negative.    ================= PRESENT TODAY ==================    1-Damon Catheter: no  2-Central Line: no  3-IV Fluids: NS @ 50  4-Drips: Mg @ 0.5mg/hr, Nicardipine @ 5mg/hr    =================== OBJECTIVE ===================    VITAL SIGNS: Last 24 Hours  T(C): 36.7 (2019 12:00), Max: 37.1 (2019 16:00)  T(F): 98 (2019 12:00), Max: 98.7 (2019 16:00)  HR: 102 (2019 12:00) (84 - 108)  BP: 134/58 (2019 12:00) (92/43 - 144/64)  BP(mean): 76 (2019 12:00) (58 - 125)  RR: 18 (2019 12:00) (14 - 36)  SpO2: 96% (2019 12:00) (93% - 100%)    19 @ 07:19 @ 07:00  --------------------------------------------------------  IN: 2915 mL / OUT: 2725 mL / NET: 190 mL    19 @ 07:01  19 @ 13:17  --------------------------------------------------------  IN: 967.5 mL / OUT: 0 mL / NET: 967.5 mL      PHYSICAL EXAM:  GEN: NAD  LUNGS: CTAB, no w/r/r  HEART: RRR, no murmur auscultated  ABD: soft, non-tender +BS  EXT: no edema, PP b/l  NEURO: AAOx3, neur exam remains non-focal    ===================== LABS =====================                        13.0   9.90  )-----------( 300      ( 2019 05:16 )             39.4         139  |  104  |  15  ----------------------------<  106<H>  3.8   |  20  |  0.6<L>    Ca    8.2<L>      2019 05:16  Phos  3.6       Mg     3.0         TPro  6.1  /  Alb  3.2<L>  /  TBili  0.4  /  DBili  x   /  AST  16  /  ALT  14  /  AlkPhos  107      PT/INR - ( 2019 05:16 )   PT: 9.90 sec;   INR: 0.86 ratio         PTT - ( 2019 05:16 )  PTT:31.4 sec        ================= MICROBIOLOGY ================    ================== IMAGING TO DATE ==================  < from: CT Angio Neck w/ IV Cont (18 @ 23:18) >  No large vessel occlusion or significant stenosis of the vessels of the head and neck.  No definite vascular malformation is identified.  < end of copied text >    < from: CT Angio Head w/ IV Cont (18 @ 23:18) >  No large vessel occlusion or significant stenosis of the vessels of the head and neck.  No definite vascular malformation is identified.  < end of copied text >      < from: CT Head No Cont (18 @ 22:56) >  Small subarachnoid hemorrhage centered in the left frontotemporal region.  < end of copied text >    < from: Transthoracic Echocardiogram (18 @ 07:46) >  Summary:   1. Left ventricular ejection fraction, by visual estimation, is 65 to   70%.   2. Normal global left ventricular systolic function.   3. Mild mitral regurgitation.   4. Mild tricuspid regurgitation.  < end of copied text >    < from: MR Venogram Head w/wo IV Cont (19 @ 11:41) >  IMPRESSION:  1.  Segmental (about 2.5 cm in length) moderate-severe narrowing of the   right distal transverse sinus suspicious for venous sinus thrombosis.    2.  1.9 cm ovoid structure within the left frontotemporal region   corresponding to the appearance of a small hematoma on CT. The appearance   is suspicious for an underlying vascular malformation. Consider further   evaluation with contrast-enhanced brain MRI.  < end of copied text >        ================== OTHER SIGNIFICANT FINDINGS ==================    ================== ALLERGIES ===================  Alcohol (Flushing)  Tamiflu (Unknown)    ==================== MEDS =====================  docusate sodium 100 milliGRAM(s) Oral three times a day  levETIRAcetam  IVPB 750 milliGRAM(s) IV Intermittent every 12 hours  magnesium sulfate Infusion 0.5 Gm/Hr IV Continuous <Continuous>  niCARdipine Infusion 5 mG/Hr IV Continuous <Continuous>  niMODipine 30 milliGRAM(s) Oral every 3 hours  pantoprazole  Injectable 40 milliGRAM(s) IV Push daily  senna 2 Tablet(s) Oral at bedtime  sodium chloride 0.9%. 1000 milliLiter(s) IV Continuous <Continuous>    PRN MEDICATIONS  acetaminophen   Tablet .. 650 milliGRAM(s) Oral every 6 hours PRN  morphine  - Injectable 2 milliGRAM(s) IV Push every 4 hours PRN  ondansetron Injectable 4 milliGRAM(s) IV Push every 4 hours PRN      ================= CONSULTS ==================    Neurosx (1/2)  Plan:  continue close neuro checks q 1hr  wean Cardene as appropriate   MRI today per Neurology  as per Dr Gamino, recommend repeat angiogram in 1 week  discussed w attg    Neuro (1/2)  Plan: Please continue to Keep -120      Continue with Cardene titration and Nimotop q 3-4H       Frequent Neuro Checks q 1H       MRI of Brain today   Cont current care    ================= ASSESS/PLAN ==================  Patient is a 33y old Female who presents with a chief complaint of SAH , transfer from [facility] (2019 10:50)  Currently admitted to medicine with the primary diagnosis of SAH.

## 2019-01-02 NOTE — PROGRESS NOTE ADULT - ASSESSMENT
IMPRESSION:    SAH      PLAN:    CNS: keppra,nimodipine, interventional neuro f/u. MRI today as per neurology. Neurology follow up.    HEENT: Oral care    PULMONARY:  HOB @ 45 degrees    CARDIOVASCULAR: SBP less than 140mmhg.     GI: GI prophylaxis.  Feeding PO diet as tolerated    RENAL:  Follow up lytes.  Correct as needed    INFECTIOUS DISEASE: Follow up cultures    HEMATOLOGICAL:  DVT prophylaxis: mechanical DVT prophylaxis    ENDOCRINE:  Follow up FS.  Insulin protocol if needed.    MUSCULOSKELETAL: out of bed     Monitor in ICU as per neurology

## 2019-01-02 NOTE — PROGRESS NOTE ADULT - ASSESSMENT
34 yo F w/ PMH of gestational HTN, asthma and PCOS transferred from [facility] for management of SAH with CTH showing small SAH in left frontotemporal region.    #SAH s/p diagnostic cerebral angiogram   - MRV (1/1): concerning for venous sinus thrombosis  - repeat MRI Head w/ contrast today (1/2)  - CTA head and neck (12/30): negative for any large vessel occlusion or significant stenosis  - c/w nimodipine 30 mg q3 to prevent vasospasm  - c/w nicardipine drip with target goal BP of 100-120   - c/w keppra 750 mg q12  - c/w magnesium drip as per neuro/neuro sx  - ECHO unremarkable  - continue neuro checks q1   - c/w IV fluid   - f/u BMP and Mg    # Intractable headache / nausea secondary to SAH  - zofran PRN for nausea   - tylenol for moderate pain  - morphine for severe pain     Diet: DASH/TLC   GI ppx: ( protonix )  DVT ppx: ( SCD )  Activity: ambulate as tolerated  Code status: Full Code ( X ) / DNR (  ) / DNI (  )  Disposition: from home, currently requiring ICU monitoring    ( X ) Discussion with patient and/or family regarding goals of care

## 2019-01-02 NOTE — PROGRESS NOTE ADULT - ASSESSMENT
33 year old woman  presented 2 days post partum with a history of gestational hypertension, PCOS and asthma transferred from [facility] for management of SAH. As per patient she developed severe headache on day of presentation. Headache started while patient laying down and described as sudden in onset, diffuse , 8/10 in intensity and progressively worsened since onset a/w photophobia , nausea but no vomiting. CTH at [facility] showed SAH for which the neurosurgeon at [facility] Dr. Calderon discussed case with Dr. Rider and patient was transferred to [facility] for possible intervention. We performed a diagnostic cerebral angiogram which failed to reveal vascular abnormality. Overnight and this morning she required more Cardene to keep a desired blood pressure range. She also developed a worsened headache that required Morphine. MRV head revealed a segment of (about 2.5 cm in length) moderate-severe narrowing of the right distal transverse sinus suspicious for venous sinus thrombosis. MRI brain pending.    Suggestions:  Follow up MRI/MRA brain.  Continue Cardene goal -120.  Continue Nimotipine.  Continue Magnesium.   Keep magnesium >2,<4  Continue Keppra.  Seizure precautions.  Serum/Urine Phos/NA/Creatinine  CBC, CMP, Mag level.

## 2019-01-02 NOTE — PROGRESS NOTE ADULT - SUBJECTIVE AND OBJECTIVE BOX
33yFemale    HPI:  33 year old woman  presented 2 days post partum with a history of gestational hypertension, PCOS and asthma transferred from [facility] for management of SAH. As per patient she developed severe headache on day of presentation. Headache started while patient laying down and described as sudden in onset, diffuse , 8/10 in intensity and progressively worsened since onset a/w photophobia , nausea but no vomiting. CTH at [facility] showed SAH for which the neurosurgeon at [facility] Dr. Calderon discussed case with Dr. Rider and patient was transferred to [facility] for possible intervention. We performed a diagnostic cerebral angiogram which failed to reveal vascular abnormality. Overnight and this morning she required more Cardene to keep a desired blood pressure range. She also developed a worsened headache that required Morphine. MRV head revealed a segment of (about 2.5 cm in length) moderate-severe narrowing of the right distal transverse sinus suspicious for venous sinus thrombosis. MRI brain pending.    PMH:  Gestational hypertension  Asthma  PCOS (polycystic ovarian syndrome)      Tests:  < from: MR Venogram Head w/wo IV Cont (19 @ 11:41) >  1.  Segmental (about 2.5 cm in length) moderate-severe narrowing of the   right distal transverse sinus suspicious for venous sinus thrombosis.    2.  1.9 cm ovoid structure within the left frontotemporal region   corresponding to the appearance of a small hematoma on CT. The appearance   is suspicious for an underlying vascular malformation. Consider further   evaluation with contrast-enhanced brain MRI.      Medications:  docusate sodium 100 milliGRAM(s) Oral three times a day  levETIRAcetam  IVPB 750 milliGRAM(s) IV Intermittent every 12 hours  magnesium sulfate Infusion 0.5 Gm/Hr IV Continuous <Continuous>  niCARdipine Infusion 5 mG/Hr IV Continuous <Continuous>  niMODipine 30 milliGRAM(s) Oral every 3 hours  pantoprazole    Tablet 40 milliGRAM(s) Oral before breakfast  senna 2 Tablet(s) Oral at bedtime  sodium chloride 0.9%. 1000 milliLiter(s) IV Continuous <Continuous>    Vitals  T(F): 98 (19 @ 15:00), Max: 98.7 (19 @ 04:15)  HR: 116 (19 @ 19:00) (86 - 116)  BP: 128/57 (19 @ 19:00) (92/43 - 147/58)  RR: 23 (19 @ 19:00) (14 - 30)  SpO2: 99% (19 @ 18:10) (93% - 99%)                        13.0   9.90  )-----------( 300      ( 2019 05:16 )             39.4         139  |  104  |  15  ----------------------------<  106<H>  3.8   |  20  |  0.6<L>    Ca    8.2<L>      2019 05:16  Phos  3.6       Mg     3.0         TPro  6.1  /  Alb  3.2<L>  /  TBili  0.4  /  DBili  x   /  AST  16  /  ALT  14  /  AlkPhos  107  -    PT/INR - ( 2019 05:16 )   PT: 9.90 sec;   INR: 0.86 ratio         PTT - ( 2019 05:16 )  PTT:31.4 sec    LIVER FUNCTIONS - ( 2019 05:16 )  Alb: 3.2 g/dL / Pro: 6.1 g/dL / ALK PHOS: 107 U/L / ALT: 14 U/L / AST: 16 U/L / GGT: x             Neuro Exam:  Drowsy, oriented to self place time  PEARLA EOMI no gaze no visual deficit  No aphasia no dysarthria no facial palsy  No drift to bilateral upper and lower extremities.  No sensory deficit  No neglect or inattention  NIHSS:0    GCS: 14   Hunt&Balbuena: 1  m-Al:1    Impression:  33 year old woman  presented 2 days post partum with a history of gestational hypertension, PCOS and asthma transferred from [facility] for management of SAH. As per patient she developed severe headache on day of presentation. Headache started while patient laying down and described as sudden in onset, diffuse , 8/10 in intensity and progressively worsened since onset a/w photophobia , nausea but no vomiting. CTH at [facility] showed SAH for which the neurosurgeon at [facility] Dr. Calderon discussed case with Dr. Rider and patient was transferred to [facility] for possible intervention. We performed a diagnostic cerebral angiogram which failed to reveal vascular abnormality. Overnight and this morning she required more Cardene to keep a desired blood pressure range. She also developed a worsened headache that required Morphine. MRV head revealed a segment of (about 2.5 cm in length) moderate-severe narrowing of the right distal transverse sinus suspicious for venous sinus thrombosis. MRI brain pending.    Suggestions:  Follow up MRI/MRA brain.  Diagnostic cerebral angiogram +/- embolization.  Continue Cardene goal -120.  Continue Nimotipine.  Continue Magnesium.   Keep magnesium >2,<4  Continue Keppra.  Seizure precautions.  Serum/Urine Phos/NA/Creatinine  CBC, CMP, Mag level.    Robbie Burris NP  [phone] 33yFemale    HPI:  33 year old woman  presented 2 days post partum with a history of gestational hypertension, PCOS and asthma transferred from [facility] for management of SAH. As per patient she developed severe headache on day of presentation. Headache started while patient laying down and described as sudden in onset, diffuse , 8/10 in intensity and progressively worsened since onset a/w photophobia , nausea but no vomiting. CTH at [facility] showed SAH for which the neurosurgeon at [facility] Dr. Calderon discussed case with Dr. Rider and patient was transferred to [facility] for possible intervention. We performed a diagnostic cerebral angiogram which failed to reveal vascular abnormality. Overnight and this morning she required more Cardene to keep a desired blood pressure range. She also developed a worsened headache that required Morphine. MRV head revealed a segment of (about 2.5 cm in length) moderate-severe narrowing of the right distal transverse sinus suspicious for venous sinus thrombosis. MRI brain pending.    PMH:  Gestational hypertension  Asthma  PCOS (polycystic ovarian syndrome)      Tests:  < from: MR Venogram Head w/wo IV Cont (19 @ 11:41) >  1.  Segmental (about 2.5 cm in length) moderate-severe narrowing of the   right distal transverse sinus suspicious for venous sinus thrombosis.    2.  1.9 cm ovoid structure within the left frontotemporal region   corresponding to the appearance of a small hematoma on CT. The appearance   is suspicious for an underlying vascular malformation. Consider further   evaluation with contrast-enhanced brain MRI.      Medications:  docusate sodium 100 milliGRAM(s) Oral three times a day  levETIRAcetam  IVPB 750 milliGRAM(s) IV Intermittent every 12 hours  magnesium sulfate Infusion 0.5 Gm/Hr IV Continuous <Continuous>  niCARdipine Infusion 5 mG/Hr IV Continuous <Continuous>  niMODipine 30 milliGRAM(s) Oral every 3 hours  pantoprazole    Tablet 40 milliGRAM(s) Oral before breakfast  senna 2 Tablet(s) Oral at bedtime  sodium chloride 0.9%. 1000 milliLiter(s) IV Continuous <Continuous>    Vitals  T(F): 98 (19 @ 15:00), Max: 98.7 (19 @ 04:15)  HR: 116 (19 @ 19:00) (86 - 116)  BP: 128/57 (19 @ 19:00) (92/43 - 147/58)  RR: 23 (19 @ 19:00) (14 - 30)  SpO2: 99% (19 @ 18:10) (93% - 99%)                        13.0   9.90  )-----------( 300      ( 2019 05:16 )             39.4         139  |  104  |  15  ----------------------------<  106<H>  3.8   |  20  |  0.6<L>    Ca    8.2<L>      2019 05:16  Phos  3.6       Mg     3.0         TPro  6.1  /  Alb  3.2<L>  /  TBili  0.4  /  DBili  x   /  AST  16  /  ALT  14  /  AlkPhos  107  -    PT/INR - ( 2019 05:16 )   PT: 9.90 sec;   INR: 0.86 ratio         PTT - ( 2019 05:16 )  PTT:31.4 sec    LIVER FUNCTIONS - ( 2019 05:16 )  Alb: 3.2 g/dL / Pro: 6.1 g/dL / ALK PHOS: 107 U/L / ALT: 14 U/L / AST: 16 U/L / GGT: x             Neuro Exam:  Drowsy, oriented to self place time  PEARLA EOMI no gaze no visual deficit  No aphasia no dysarthria no facial palsy  No drift to bilateral upper and lower extremities.  No sensory deficit  No neglect or inattention  NIHSS:0    GCS: 14   Hunt&Balbuena: 1  m-Al:1    Impression:  33 year old woman  presented 2 days post partum with a history of gestational hypertension, PCOS and asthma transferred from [facility] for management of SAH. As per patient she developed severe headache on day of presentation. Headache started while patient laying down and described as sudden in onset, diffuse , 8/10 in intensity and progressively worsened since onset a/w photophobia , nausea but no vomiting. CTH at [facility] showed SAH for which the neurosurgeon at [facility] Dr. Calderon discussed case with Dr. Rider and patient was transferred to [facility] for possible intervention. We performed a diagnostic cerebral angiogram which failed to reveal vascular abnormality. Overnight and this morning she required more Cardene to keep a desired blood pressure range. She also developed a worsened headache that required Morphine. MRV head revealed a segment of (about 2.5 cm in length) moderate-severe narrowing of the right distal transverse sinus suspicious for venous sinus thrombosis. MRI brain pending.    Suggestions:  Follow up MRI/MRA brain.  Continue Cardene goal -120.  Continue Nimotipine.  Continue Magnesium.   Keep magnesium >2,<4  Continue Keppra.  Seizure precautions.  Serum/Urine Phos/NA/Creatinine  CBC, CMP, Mag level.    Robbie Burris NP  [phone]

## 2019-01-03 LAB
ALBUMIN SERPL ELPH-MCNC: 3.2 G/DL — LOW (ref 3.5–5.2)
ALP SERPL-CCNC: 85 U/L — SIGNIFICANT CHANGE UP (ref 30–115)
ALT FLD-CCNC: 12 U/L — SIGNIFICANT CHANGE UP (ref 0–41)
ANION GAP SERPL CALC-SCNC: 17 MMOL/L — HIGH (ref 7–14)
AST SERPL-CCNC: 15 U/L — SIGNIFICANT CHANGE UP (ref 0–41)
BASOPHILS # BLD AUTO: 0.05 K/UL — SIGNIFICANT CHANGE UP (ref 0–0.2)
BASOPHILS NFR BLD AUTO: 0.6 % — SIGNIFICANT CHANGE UP (ref 0–1)
BILIRUB SERPL-MCNC: 0.4 MG/DL — SIGNIFICANT CHANGE UP (ref 0.2–1.2)
BUN SERPL-MCNC: 16 MG/DL — SIGNIFICANT CHANGE UP (ref 10–20)
CALCIUM SERPL-MCNC: 7.5 MG/DL — LOW (ref 8.5–10.1)
CHLORIDE SERPL-SCNC: 104 MMOL/L — SIGNIFICANT CHANGE UP (ref 98–110)
CO2 SERPL-SCNC: 19 MMOL/L — SIGNIFICANT CHANGE UP (ref 17–32)
CREAT ?TM UR-MCNC: 76 MG/DL — SIGNIFICANT CHANGE UP
CREAT SERPL-MCNC: 0.6 MG/DL — LOW (ref 0.7–1.5)
EOSINOPHIL # BLD AUTO: 0.32 K/UL — SIGNIFICANT CHANGE UP (ref 0–0.7)
EOSINOPHIL NFR BLD AUTO: 3.6 % — SIGNIFICANT CHANGE UP (ref 0–8)
GLUCOSE SERPL-MCNC: 95 MG/DL — SIGNIFICANT CHANGE UP (ref 70–99)
HCT VFR BLD CALC: 36 % — LOW (ref 37–47)
HGB BLD-MCNC: 11.5 G/DL — LOW (ref 12–16)
IMM GRANULOCYTES NFR BLD AUTO: 0.9 % — HIGH (ref 0.1–0.3)
INR BLD: 0.93 RATIO — SIGNIFICANT CHANGE UP (ref 0.65–1.3)
LYMPHOCYTES # BLD AUTO: 1.43 K/UL — SIGNIFICANT CHANGE UP (ref 1.2–3.4)
LYMPHOCYTES # BLD AUTO: 16.3 % — LOW (ref 20.5–51.1)
MAGNESIUM SERPL-MCNC: 3.3 MG/DL — CRITICAL HIGH (ref 1.8–2.4)
MAGNESIUM SERPL-MCNC: 3.9 MG/DL — CRITICAL HIGH (ref 1.8–2.4)
MCHC RBC-ENTMCNC: 27.6 PG — SIGNIFICANT CHANGE UP (ref 27–31)
MCHC RBC-ENTMCNC: 31.9 G/DL — LOW (ref 32–37)
MCV RBC AUTO: 86.3 FL — SIGNIFICANT CHANGE UP (ref 81–99)
MONOCYTES # BLD AUTO: 0.54 K/UL — SIGNIFICANT CHANGE UP (ref 0.1–0.6)
MONOCYTES NFR BLD AUTO: 6.2 % — SIGNIFICANT CHANGE UP (ref 1.7–9.3)
NEUTROPHILS # BLD AUTO: 6.35 K/UL — SIGNIFICANT CHANGE UP (ref 1.4–6.5)
NEUTROPHILS NFR BLD AUTO: 72.4 % — SIGNIFICANT CHANGE UP (ref 42.2–75.2)
PHOSPHATE 24H UR-MCNC: 65 MG/DL — SIGNIFICANT CHANGE UP
PLATELET # BLD AUTO: 224 K/UL — SIGNIFICANT CHANGE UP (ref 130–400)
POTASSIUM SERPL-MCNC: 4.1 MMOL/L — SIGNIFICANT CHANGE UP (ref 3.5–5)
POTASSIUM SERPL-SCNC: 4.1 MMOL/L — SIGNIFICANT CHANGE UP (ref 3.5–5)
POTASSIUM UR-SCNC: 23 MMOL/L — SIGNIFICANT CHANGE UP
PROT ?TM UR-MCNC: 50 MG/DLG/24H — SIGNIFICANT CHANGE UP
PROT SERPL-MCNC: 5.6 G/DL — LOW (ref 6–8)
PROTHROM AB SERPL-ACNC: 10.7 SEC — SIGNIFICANT CHANGE UP (ref 9.95–12.87)
RBC # BLD: 4.17 M/UL — LOW (ref 4.2–5.4)
RBC # FLD: 13.2 % — SIGNIFICANT CHANGE UP (ref 11.5–14.5)
SODIUM SERPL-SCNC: 140 MMOL/L — SIGNIFICANT CHANGE UP (ref 135–146)
SODIUM UR-SCNC: 66 MMOL/L — SIGNIFICANT CHANGE UP
URATE UR-MCNC: 42.1 MG/DL — SIGNIFICANT CHANGE UP
WBC # BLD: 8.77 K/UL — SIGNIFICANT CHANGE UP (ref 4.8–10.8)
WBC # FLD AUTO: 8.77 K/UL — SIGNIFICANT CHANGE UP (ref 4.8–10.8)

## 2019-01-03 PROCEDURE — 99232 SBSQ HOSP IP/OBS MODERATE 35: CPT

## 2019-01-03 RX ORDER — CHLORHEXIDINE GLUCONATE 213 G/1000ML
1 SOLUTION TOPICAL
Qty: 0 | Refills: 0 | Status: DISCONTINUED | OUTPATIENT
Start: 2019-01-03 | End: 2019-01-10

## 2019-01-03 RX ORDER — ACETAMINOPHEN 500 MG
650 TABLET ORAL EVERY 4 HOURS
Qty: 0 | Refills: 0 | Status: DISCONTINUED | OUTPATIENT
Start: 2019-01-03 | End: 2019-01-07

## 2019-01-03 RX ORDER — MAGNESIUM SULFATE 500 MG/ML
1 VIAL (ML) INJECTION
Qty: 40 | Refills: 0 | Status: DISCONTINUED | OUTPATIENT
Start: 2019-01-03 | End: 2019-01-03

## 2019-01-03 RX ORDER — NIFEDIPINE 30 MG
10 TABLET, EXTENDED RELEASE 24 HR ORAL EVERY 8 HOURS
Qty: 0 | Refills: 0 | Status: DISCONTINUED | OUTPATIENT
Start: 2019-01-03 | End: 2019-01-05

## 2019-01-03 RX ORDER — TRAMADOL HYDROCHLORIDE 50 MG/1
50 TABLET ORAL EVERY 6 HOURS
Qty: 0 | Refills: 0 | Status: DISCONTINUED | OUTPATIENT
Start: 2019-01-03 | End: 2019-01-07

## 2019-01-03 RX ORDER — ACETAMINOPHEN 500 MG
650 TABLET ORAL EVERY 6 HOURS
Qty: 0 | Refills: 0 | Status: DISCONTINUED | OUTPATIENT
Start: 2019-01-03 | End: 2019-01-03

## 2019-01-03 RX ORDER — TRAMADOL HYDROCHLORIDE 50 MG/1
25 TABLET ORAL EVERY 6 HOURS
Qty: 0 | Refills: 0 | Status: DISCONTINUED | OUTPATIENT
Start: 2019-01-03 | End: 2019-01-03

## 2019-01-03 RX ORDER — SODIUM CHLORIDE 9 MG/ML
1000 INJECTION INTRAMUSCULAR; INTRAVENOUS; SUBCUTANEOUS
Qty: 0 | Refills: 0 | Status: DISCONTINUED | OUTPATIENT
Start: 2019-01-03 | End: 2019-01-07

## 2019-01-03 RX ORDER — MAGNESIUM SULFATE 500 MG/ML
2 VIAL (ML) INJECTION ONCE
Qty: 0 | Refills: 0 | Status: DISCONTINUED | OUTPATIENT
Start: 2019-01-03 | End: 2019-01-03

## 2019-01-03 RX ORDER — LABETALOL HCL 100 MG
100 TABLET ORAL EVERY 6 HOURS
Qty: 0 | Refills: 0 | Status: DISCONTINUED | OUTPATIENT
Start: 2019-01-03 | End: 2019-01-04

## 2019-01-03 RX ORDER — MAGNESIUM SULFATE 500 MG/ML
0.5 VIAL (ML) INJECTION
Qty: 40 | Refills: 0 | Status: DISCONTINUED | OUTPATIENT
Start: 2019-01-03 | End: 2019-01-03

## 2019-01-03 RX ADMIN — Medication 650 MILLIGRAM(S): at 12:44

## 2019-01-03 RX ADMIN — Medication 10 MILLIGRAM(S): at 21:05

## 2019-01-03 RX ADMIN — Medication 650 MILLIGRAM(S): at 17:16

## 2019-01-03 RX ADMIN — Medication 650 MILLIGRAM(S): at 11:53

## 2019-01-03 RX ADMIN — NIMODIPINE 30 MILLIGRAM(S): 60 SOLUTION ORAL at 17:17

## 2019-01-03 RX ADMIN — Medication 650 MILLIGRAM(S): at 05:53

## 2019-01-03 RX ADMIN — NIMODIPINE 30 MILLIGRAM(S): 60 SOLUTION ORAL at 09:41

## 2019-01-03 RX ADMIN — NIMODIPINE 30 MILLIGRAM(S): 60 SOLUTION ORAL at 14:25

## 2019-01-03 RX ADMIN — Medication 100 MILLIGRAM(S): at 05:35

## 2019-01-03 RX ADMIN — LEVETIRACETAM 400 MILLIGRAM(S): 250 TABLET, FILM COATED ORAL at 17:17

## 2019-01-03 RX ADMIN — TRAMADOL HYDROCHLORIDE 25 MILLIGRAM(S): 50 TABLET ORAL at 11:30

## 2019-01-03 RX ADMIN — Medication 100 MILLIGRAM(S): at 12:28

## 2019-01-03 RX ADMIN — Medication 100 MILLIGRAM(S): at 21:06

## 2019-01-03 RX ADMIN — CHLORHEXIDINE GLUCONATE 1 APPLICATION(S): 213 SOLUTION TOPICAL at 05:35

## 2019-01-03 RX ADMIN — NIMODIPINE 30 MILLIGRAM(S): 60 SOLUTION ORAL at 12:28

## 2019-01-03 RX ADMIN — Medication 650 MILLIGRAM(S): at 21:06

## 2019-01-03 RX ADMIN — Medication 650 MILLIGRAM(S): at 06:08

## 2019-01-03 RX ADMIN — Medication 100 MILLIGRAM(S): at 17:17

## 2019-01-03 RX ADMIN — Medication 10 MILLIGRAM(S): at 14:25

## 2019-01-03 RX ADMIN — PANTOPRAZOLE SODIUM 40 MILLIGRAM(S): 20 TABLET, DELAYED RELEASE ORAL at 06:07

## 2019-01-03 RX ADMIN — NIMODIPINE 30 MILLIGRAM(S): 60 SOLUTION ORAL at 23:42

## 2019-01-03 RX ADMIN — Medication 650 MILLIGRAM(S): at 19:15

## 2019-01-03 RX ADMIN — ONDANSETRON 4 MILLIGRAM(S): 8 TABLET, FILM COATED ORAL at 06:08

## 2019-01-03 RX ADMIN — LEVETIRACETAM 400 MILLIGRAM(S): 250 TABLET, FILM COATED ORAL at 05:52

## 2019-01-03 RX ADMIN — NIMODIPINE 30 MILLIGRAM(S): 60 SOLUTION ORAL at 05:35

## 2019-01-03 RX ADMIN — NIMODIPINE 30 MILLIGRAM(S): 60 SOLUTION ORAL at 03:14

## 2019-01-03 RX ADMIN — Medication 100 MILLIGRAM(S): at 14:25

## 2019-01-03 RX ADMIN — SENNA PLUS 2 TABLET(S): 8.6 TABLET ORAL at 21:06

## 2019-01-03 RX ADMIN — Medication 200 MILLIGRAM(S): at 05:35

## 2019-01-03 RX ADMIN — Medication 650 MILLIGRAM(S): at 21:21

## 2019-01-03 RX ADMIN — NIMODIPINE 30 MILLIGRAM(S): 60 SOLUTION ORAL at 21:06

## 2019-01-03 RX ADMIN — TRAMADOL HYDROCHLORIDE 25 MILLIGRAM(S): 50 TABLET ORAL at 10:49

## 2019-01-03 RX ADMIN — Medication 100 MILLIGRAM(S): at 23:43

## 2019-01-03 NOTE — PROGRESS NOTE ADULT - SUBJECTIVE AND OBJECTIVE BOX
33yFemale    HPI:  33 year old woman  presented 2 days post partum with a history of gestational hypertension, PCOS and asthma transferred from [facility] for management of SAH. As per patient she developed severe headache on day of presentation. Headache started while patient laying down and described as sudden in onset, diffuse , 8/10 in intensity and progressively worsened since onset a/w photophobia , nausea but no vomiting. CTH at [facility] showed SAH for which the neurosurgeon at [facility] Dr. Calderon discussed case with Dr. Rider and patient was transferred to [facility] for possible intervention. We performed a diagnostic cerebral angiogram which failed to reveal vascular abnormality. Yesterday she required more Cardene to keep a desired blood pressure range. She also developed a worsened headache that required Morphine. MRV head revealed a segment of (about 2.5 cm in length) moderate-severe narrowing of the right distal transverse sinus suspicious for venous sinus thrombosis. 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 MRA head failed to reveal vasospasm.    PMH:  Gestational hypertension  Asthma  PCOS (polycystic ovarian syndrome)      Tests:  < from: MR Head w/ IV Cont (19 @ 21:24) >    Impression:    1.  1 cm left temporal superficial cavernoma. Consider the possibility   that the cavernoma bled into the adjacent sulcus, causing SAH    2.  Possible adjacent small developmental venous anomaly (correlation   with conventional angiogram )    3.  No evidence for cavernous sinus thrombosis    4.  Thinning of the right lateral transverse sinus. This is in an area   where there is an arachnoid granulation. (Correlation with CT scan).   Consider altered flow dynamics or stenosis, less likely intraluminal   thrombus    Acute Issues:  Blood pressure management.  SAH.  Electrolyte management.  Headache Management.    Overnight Events: None    Medication:  chlorhexidine 4% Liquid 1 Application(s) Topical <User Schedule>  docusate sodium 100 milliGRAM(s) Oral three times a day  labetalol 100 milliGRAM(s) Oral every 6 hours  levETIRAcetam  IVPB 750 milliGRAM(s) IV Intermittent every 12 hours  magnesium sulfate Infusion 1 Gm/Hr IV Continuous <Continuous>  niCARdipine Infusion 5 mG/Hr IV Continuous <Continuous>  NIFEdipine IR 10 milliGRAM(s) Oral every 8 hours  niMODipine 30 milliGRAM(s) Oral every 3 hours  pantoprazole    Tablet 40 milliGRAM(s) Oral before breakfast  senna 2 Tablet(s) Oral at bedtime    Vitals:  T(F): 100.9 (19 @ 08:00), Max: 100.9 (19 @ 08:00)  HR: 94 (19 @ 08:15) (84 - 120)  BP: 117/71 (19 @ 08:15) (84/41 - 148/61)  RR: 24 (19 @ 08:15) (15 - 29)  SpO2: 95% (19 @ 08:15) (87% - 99%)                        11.5   8.77  )-----------( 224      ( 2019 05:01 )             36.0         140  |  104  |  16  ----------------------------<  95  4.1   |  19  |  0.6<L>    Ca    7.5<L>      2019 05:01  Phos  4.0     -  Mg     3.3         TPro  5.6<L>  /  Alb  3.2<L>  /  TBili  0.4  /  DBili  x   /  AST  15  /  ALT  12  /  AlkPhos  85  -03    PT/INR - ( 2019 05:01 )   PT: 10.70 sec;   INR: 0.93 ratio         PTT - ( 2019 05:16 )  PTT:31.4 sec  Urinalysis Basic - ( 2019 21:45 )    Color: Yellow / Appearance: Cloudy / S.020 / pH: x  Gluc: x / Ketone: Negative  / Bili: Negative / Urobili: 0.2 mg/dL   Blood: x / Protein: 30 mg/dL / Nitrite: Negative   Leuk Esterase: Small / RBC: >50 /HPF / WBC 10-25 /HPF   Sq Epi: x / Non Sq Epi: Few /HPF / Bacteria: Moderate /HPF  Potassium, Random Urine: 23  Sodium, Random Urine: 66  Creatinine, Random Urine: 76    LIVER FUNCTIONS - ( 2019 05:01 )  Alb: 3.2 g/dL / Pro: 5.6 g/dL / ALK PHOS: 85 U/L / ALT: 12 U/L / AST: 15 U/L / GGT: x           Neuro Exam:  Drowsy but more alert today, oriented to self place time  PEARLA EOMI no gaze no visual deficit  No aphasia no dysarthria no facial palsy  No drift to bilateral upper and lower extremities.  No sensory deficit  No neglect or inattention  NIHSS:0    GCS: 15   Hunt&Balbuena: 1  m-Al:1    Impression:  33 year old woman  presented 2 days post partum with a history of gestational hypertension, PCOS and asthma transferred from [facility] for management of SAH. As per patient she developed severe headache on day of presentation. Headache started while patient laying down and described as sudden in onset, diffuse , 8/10 in intensity and progressively worsened since onset a/w photophobia , nausea but no vomiting. CTH at [facility] showed SAH and patient was transferred to [facility] for possible intervention. We performed a diagnostic cerebral angiogram which failed to reveal vascular abnormality. Yesterday she required more Cardene to keep a desired blood pressure range. She also developed a worsened headache that required Morphine. MRV head revealed a segment of (about 2.5 cm in length) moderate-severe narrowing of the right distal transverse sinus suspicious for venous sinus thrombosis. 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 MRA head failed to reveal vasospasm. Today she is awake and less drowsy with less intensity of headache.    Suggestions:  Continue Cardene goal -130.  Add Labetolol 100 mg PO q6 hours.  Add procardia 10 mg q8 hours.  Continue Nimotipine.  Continue Magnesium sulfate drip, increase dose to 1 gram per hour and check serum Magnesium level q6 hours. Check reflexes q4 hours for depression of reflexes due to hypermagnesemia.  Keep magnesium >2, less than or equal to 4.  Continue IVF.  Continue Keppra.  Please avoid morphine for pain, suggest toradol.  Seizure precautions.  DVT prophalaxis.  Consult OB GYN STAT for evaluation of preeclampsia.  Consult Opthalmology, rule out papilledema.         Robbie Burris NP  [phone]        Suggestions: 33yFemale    HPI:  33 year old woman  presented 2 days post partum with a history of gestational hypertension, PCOS and asthma transferred from [facility] for management of SAH. As per patient she developed severe headache on day of presentation. Headache started while patient laying down and described as sudden in onset, diffuse , 8/10 in intensity and progressively worsened since onset a/w photophobia , nausea but no vomiting. CTH at [facility] showed SAH for which the neurosurgeon at [facility] Dr. Calderon discussed case with Dr. Rider and patient was transferred to [facility] for possible intervention. 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 Yesterday she required more Cardene to keep a desired blood pressure range. She also developed a worsened headache that required Morphine. MRV head revealed a segment of (about 2.5 cm in length) moderate-severe narrowing of the right distal transverse sinus suspicious for venous sinus thrombosis; however this was not confirmed in the conventional angiogram. 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 MRA head failed to reveal vasospasm. Today she developed fever but her WBC is trending down in compare to yesterday. In general she reports that she is improving on daily basis since she was transferred to this center.    PMH:  Gestational hypertension  Asthma  PCOS (polycystic ovarian syndrome)      Tests:  < from: MR Head w/ IV Cont (19 @ 21:24) >    Impression:    1.  1 cm left temporal superficial cavernoma. Consider the possibility   that the cavernoma bled into the adjacent sulcus, causing SAH    2.  Possible adjacent small developmental venous anomaly (correlation   with conventional angiogram )    3.  No evidence for cavernous sinus thrombosis    4.  Thinning of the right lateral transverse sinus. This is in an area   where there is an arachnoid granulation. (Correlation with CT scan).   Consider altered flow dynamics or stenosis, less likely intraluminal   thrombus    Acute Issues:  Blood pressure management.  SAH.  Electrolyte management.  Headache Management.    Overnight Events: None    Medication:  chlorhexidine 4% Liquid 1 Application(s) Topical <User Schedule>  docusate sodium 100 milliGRAM(s) Oral three times a day  labetalol 100 milliGRAM(s) Oral every 6 hours  levETIRAcetam  IVPB 750 milliGRAM(s) IV Intermittent every 12 hours  magnesium sulfate Infusion 1 Gm/Hr IV Continuous <Continuous>  niCARdipine Infusion 5 mG/Hr IV Continuous <Continuous>  NIFEdipine IR 10 milliGRAM(s) Oral every 8 hours  niMODipine 30 milliGRAM(s) Oral every 3 hours  pantoprazole    Tablet 40 milliGRAM(s) Oral before breakfast  senna 2 Tablet(s) Oral at bedtime    Vitals:  T(F): 100.9 (19 @ 08:00), Max: 100.9 (19 @ 08:00)  HR: 94 (19 @ 08:15) (84 - 120)  BP: 117/71 (19 @ 08:15) (84/41 - 148/61)  RR: 24 (19 @ 08:15) (15 - 29)  SpO2: 95% (19 @ 08:15) (87% - 99%)                        11.5   8.77  )-----------( 224      ( 2019 05:01 )             36.0     01-03    140  |  104  |  16  ----------------------------<  95  4.1   |  19  |  0.6<L>    Ca    7.5<L>      2019 05:01  Phos  4.0     -  Mg     3.3         TPro  5.6<L>  /  Alb  3.2<L>  /  TBili  0.4  /  DBili  x   /  AST  15  /  ALT  12  /  AlkPhos  85  01-03    PT/INR - ( 2019 05:01 )   PT: 10.70 sec;   INR: 0.93 ratio         PTT - ( 2019 05:16 )  PTT:31.4 sec  Urinalysis Basic - ( 2019 21:45 )    Color: Yellow / Appearance: Cloudy / S.020 / pH: x  Gluc: x / Ketone: Negative  / Bili: Negative / Urobili: 0.2 mg/dL   Blood: x / Protein: 30 mg/dL / Nitrite: Negative   Leuk Esterase: Small / RBC: >50 /HPF / WBC 10-25 /HPF   Sq Epi: x / Non Sq Epi: Few /HPF / Bacteria: Moderate /HPF  Potassium, Random Urine: 23  Sodium, Random Urine: 66  Creatinine, Random Urine: 76    LIVER FUNCTIONS - ( 2019 05:01 )  Alb: 3.2 g/dL / Pro: 5.6 g/dL / ALK PHOS: 85 U/L / ALT: 12 U/L / AST: 15 U/L / GGT: x           Neuro Exam:  Drowsy but more alert today, oriented to self place time  PEARLA EOMI no gaze no visual deficit  No aphasia no dysarthria no facial palsy  No drift to bilateral upper and lower extremities.  No sensory deficit  No neglect or inattention  NIHSS:0    GCS: 15   Hunt&Balbuena: 1  m-Al:1    Impression:  33 year old woman  presented 2 days post partum with a history of gestational hypertension, PCOS and asthma transferred from [facility] for management of SAH. As per patient she developed severe headache on day of presentation. Headache started while patient laying down and described as sudden in onset, diffuse , 8/10 in intensity and progressively worsened since onset a/w photophobia , nausea but no vomiting. CTH at [facility] showed SAH for which the neurosurgeon at [facility] Dr. Calderon discussed case with Dr. Rider and patient was transferred to [facility] for possible intervention. 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 Yesterday she required more Cardene to keep a desired blood pressure range. She also developed a worsened headache that required Morphine. MRV head revealed a segment of (about 2.5 cm in length) moderate-severe narrowing of the right distal transverse sinus suspicious for venous sinus thrombosis; however this was not confirmed in the conventional angiogram. 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 MRA head failed to reveal vasospasm. Today she developed fever but her WBC is trending down in compare to yesterday. In general she reports that she is improving on daily basis since she was transferred to this center. Her exam is nonfocal, but still has photo&phonophobia as well as left>right sided headache which is worsening by head motion.    Suggestions:  Continue Cardene drip with the goal SBP of 100-130.  Add Labetolol 100 mg PO q6 hours(her outpt med).  Add procardia 10 mg q8 hours(her outpt med).  Continue Nimotipine 30 q3h.  Continue Magnesium sulfate drip, increase dose to 1 gram per hour and check serum Magnesium level q6 hours. Check Knee reflexes q4 hours for depression of reflexes due to hypermagnesemia.  Hold magnesium drip if s. Mag is more than or equal to 4.  Continue NS IVF and well hydration.  Continue Keppra.  Please avoid opioid medications for pain, suggest toradol.  Seizure precautions.  Please make sure patient has Sequential DVT prophalaxis on.  Consult OB GYN STAT for evaluation of rare case of preeclampsia after labor.  Consult Opthalmology, rule out papilledema.   Please Call Code Stroke if worsening of neurological symptoms.      Robbie Burris NP  [phone]        Suggestions:

## 2019-01-03 NOTE — PROGRESS NOTE ADULT - ASSESSMENT
34 yo F w/ PMH of gestational HTN, asthma and PCOS transferred from [facility] for management of SAH with CTH showing small SAH in left frontotemporal region.    #SAH s/p diagnostic cerebral angiogram   - CTA head and neck (12/30): negative for any large vessel occlusion or significant stenosis  - MRV (1/1): concerning for venous sinus thrombosis  - MR Head (1/2): left temproal superficial cavernoma  - as per neurology, area of bleed does not correspond with the location of the cavernoma and need to rule out other cause  - add labetolol 100 mg PO q6h  - add procardia 10 mg PO q8h    - c/w nicardipine drip  - target SBP to 100 - 130  - c/w nimodipine 30 mg q3 and NS @ 50 to prevent vasospasm  - c/w keppra 750 mg q12  - increased magnesium drip to 1g/hr as per neuro   - c/w neuro checks q1   - f/u BMP and Mg    # Induced hypermagnesemia   - c/w Mg 1g/hr   - tendon reflex q4h, watch for depressed reflex secondary to hypermagnesemia   - f/u Mg q6h   - maintains Mg between 2 - 4    # Intractable headache / nausea secondary to SAH  - zofran PRN for nausea   - tylenol for moderate pain  - tramadol for severe pain  - avoid opioid     Diet: DASH/TLC   GI ppx: ( protonix )  DVT ppx: ( SCD )  Activity: ambulate as tolerated  Code status: Full Code ( X ) / DNR (  ) / DNI (  )  Disposition: from home, currently requiring ICU monitoring    ( X ) Discussion with patient and/or family regarding goals of care

## 2019-01-03 NOTE — PROGRESS NOTE ADULT - SUBJECTIVE AND OBJECTIVE BOX
Stroke service addendum:  Patients Temp:101F     -Make tylenol 650 mg q4 hours RTC.  -Assess reflexes q4 hours for hyporeflexia, currently 3+.  -Q2 neuro checks, if worsening NIHSS, nausea, vomiting, headache call code stroke.      Robbie Burris NP  [id number]

## 2019-01-03 NOTE — PROGRESS NOTE ADULT - SUBJECTIVE AND OBJECTIVE BOX
Neurology Follow up note    Patient seen and examined at bedside, Bp now on target ,HR is better, averaging between 80-90 bpm was started on labetalol last evening, and Cardene drip is being gradually titrated down.  She has intermittent diffuse headaches , which is mild -moderate in intensity and relieved with .       Vital Signs Last 24 Hrs  T(C): 36.7 (02 Jan 2019 15:00), Max: 37.1 (02 Jan 2019 04:15)  T(F): 98 (02 Jan 2019 15:00), Max: 98.7 (02 Jan 2019 04:15)  HR: 90 (03 Jan 2019 01:30) (86 - 120)  BP: 102/51 (03 Jan 2019 01:30) (84/41 - 148/61)  BP(mean): 66 (03 Jan 2019 01:30) (50 - 125)  RR: 20 (03 Jan 2019 01:30) (14 - 29)  SpO2: 95% (03 Jan 2019 01:30) (93% - 99%)          Neurological Exam:   Mental status: Patient is a/o x 4 , she is answering all questions appropriately    Cranial nerves: Fundoscopic exam demonstrated no abnormalities, pupils equally round and reactive to light, visual fields full, no nystagmus, extraocular muscles intact, V1 through V3 intact bilaterally and symmetric, face symmetric, hearing intact to finger rub, palate elevation symmetric, tongue was midline, sternocleidomastoid/shoulder shrug strength bilaterally 5/5.    Motor:  Normal bulk and tone, strength 5/5 in bilateral upper and lower extremities.   strength 5/5.  Rapid alternating movements intact and symmetric.   Sensation: Intact to light touch, proprioception, and pinprick.  No neglect.   Coordination: No dysmetria on finger-to-nose and heel-to-shin.  No clumsiness.  Reflexes: 2+ in upper and lower extremities, downgoing toes bilaterally  Gait: Narrow and steady. No ataxia.  Romberg negative    Medications  acetaminophen   Tablet .. 650 milliGRAM(s) Oral every 6 hours PRN  docusate sodium 100 milliGRAM(s) Oral three times a day  labetalol 200 milliGRAM(s) Oral every 8 hours  levETIRAcetam  IVPB 750 milliGRAM(s) IV Intermittent every 12 hours  magnesium sulfate Infusion 0.5 Gm/Hr IV Continuous <Continuous>  morphine  - Injectable 2 milliGRAM(s) IV Push every 4 hours PRN  niCARdipine Infusion 5 mG/Hr IV Continuous <Continuous>  niMODipine 30 milliGRAM(s) Oral every 3 hours  ondansetron Injectable 4 milliGRAM(s) IV Push every 4 hours PRN  pantoprazole    Tablet 40 milliGRAM(s) Oral before breakfast  senna 2 Tablet(s) Oral at bedtime  sodium chloride 0.9%. 1000 milliLiter(s) IV Continuous <Continuous>      Lab      Radiology Neurology Follow up note    Patient seen and examined at bedside, Bp better overnight , HR is better, averaging between 80-90 bpm was started on labetalol last evening, and Cardene drip is being gradually titrated down.  She has intermittent diffuse headaches , which is mild -moderate in intensity and relieved with Tylenol Patient denies dizziness and visual changes. No acute overnight events. Patient is currently resting comfortably and currently denies headaches during this interview and exam.       Vital Signs Last 24 Hrs  T(C): 36.7 (02 Jan 2019 15:00), Max: 37.1 (02 Jan 2019 04:15)  T(F): 98 (02 Jan 2019 15:00), Max: 98.7 (02 Jan 2019 04:15)  HR: 90 (03 Jan 2019 01:30) (86 - 120)  BP: 102/51 (03 Jan 2019 01:30) (84/41 - 148/61)  BP(mean): 66 (03 Jan 2019 01:30) (50 - 125)  RR: 20 (03 Jan 2019 01:30) (14 - 29)  SpO2: 95% (03 Jan 2019 01:30) (93% - 99%)          Neurological Exam:   Mental status: Patient is a/o x 4 , she is answering all questions appropriately    Cranial nerves: photophobia, pupils 2mm ou , eyes midline, eom intact, tongue midline, speech intact   Motor:  5/5 throughout/ no drift  Sensation: Intact and symmetric  Coordination: No dysmetria or limb ataxia  Reflexes: 2+ in upper and lower extremities, downgoing toes bilaterally  Gait: deferred  Toes downgoing    Medications  acetaminophen   Tablet .. 650 milliGRAM(s) Oral every 6 hours PRN  docusate sodium 100 milliGRAM(s) Oral three times a day  labetalol 200 milliGRAM(s) Oral every 8 hours  levETIRAcetam  IVPB 750 milliGRAM(s) IV Intermittent every 12 hours  magnesium sulfate Infusion 0.5 Gm/Hr IV Continuous <Continuous>  morphine  - Injectable 2 milliGRAM(s) IV Push every 4 hours PRN  niCARdipine Infusion 5 mG/Hr IV Continuous <Continuous>  niMODipine 30 milliGRAM(s) Oral every 3 hours  ondansetron Injectable 4 milliGRAM(s) IV Push every 4 hours PRN  pantoprazole    Tablet 40 milliGRAM(s) Oral before breakfast  senna 2 Tablet(s) Oral at bedtime  sodium chloride 0.9%. 1000 milliLiter(s) IV Continuous <Continuous>      Lab  Magnesium, Serum (01.02.19 @ 21:55)    Magnesium, Serum: 3.5: Critical value:notified dr lopez on 01/02/19 23:57 mg/dL    Radiology

## 2019-01-03 NOTE — PROGRESS NOTE ADULT - ASSESSMENT
32 yo F  presented 2 days post partum with a history of gestational hypertension, PCOS and asthma transferred from [facility] for management of SAH. CTH at [facility] showed SAH for which patient was transferred to [facility] for possible intervention. We performed a diagnostic cerebral angiogram which failed to reveal vascular abnormality. Patient was started on Labetalol po last evening and Cardene drip is gradually being titrated down, as BP is maintained at desired target. HR is also better and is averaging 80-90 bpm. Mri brain was completed and is s/o a left cavernoma (official reports pending)    Plan  Follow up pending official read of MRI brain  Please obtain ob/gyn consult for manage of eclampsia  Continue Cardene goal -120.  Continue Nimotipine.  Continue Magnesium.   Keep magnesium >2,<4  Continue Keppra 750 mg bid  Seizure precautions.  Serum/Urine Phos/NA/Creatinine  CBC, CMP, Mag level. 32 yo F  presented 2 days post partum with a history of gestational hypertension, PCOS and asthma transferred from [facility] for management of SAH. CTH at [facility] showed SAH for which patient was transferred to [facility] for possible intervention. We performed a diagnostic cerebral angiogram which failed to reveal vascular abnormality. Patient was started on Labetalol po last evening and Cardene drip is gradually being titrated down, as BP is maintained at desired target. HR is also better and is averaging 80-90 bpm. Mri brain was completed and is s/o a left cavernoma (official reports pending)    Plan  Follow up pending official read of MRI brain  Please obtain ob/gyn consult for manage of eclampsia  Continue Cardene goal -120.  Continue Nimotipine.  Continue Magnesium.   keep HOB >30 Degrees  Keep magnesium >2,<4  Continue Keppra 750 mg bid  Seizure precautions.  Serum/Urine Phos/NA/Creatinine  CBC, CMP, Mag level.

## 2019-01-03 NOTE — CONSULT NOTE ADULT - SUBJECTIVE AND OBJECTIVE BOX
BALDEV KEE  MRN-[id number]  33y Female      Patient is a 33y old  Female who presents with a chief complaint of SAH , transfer from [facility] (2019 10:55)    HEALTH ISSUES - R/O PROBLEM Dx:    HPI:  32 y/o female  who presents 2 days post partum with hx of gestational hypertension, PCOS and asthma transferred from [facility] for management of SAH. As per patient she developed severe headache since 3:30pm on day of presentation. Headache started while patient laying down and described as sudden in onset , diffuse , 8/10 in intensity and progressively worsening since afternoon a/w photophobia , nausea but no vomiting. Headache worsens with head movements. CTH at [facility] showed SAH for which the neurosurgeon at [facility] Dr. Calderon discussed case with dr fontanez and patient was transferred to [facility] for possible intervention like coiling in am .   No history of similar events in past    VS upon admission to MICU bp 120/66 , hr 86, tmax 98.7 (31 Dec 2018 02:15)      Extended HPI: PT with h/o gestational HTN with recently diagnosed SAH to be evaluated for papilledema. Pt reports no acute change in vision. Pt denies blurry vision, peripheral vision changes, diplopia or pain with eye movement. Pt reports history of refractive error but no other significant ocular issues.  Pt alert, oriented, examined by me at the bedside.   (-)burning, itching, redness, tearing OD/OS  (-)flashes, floaters, eye pain OD/OS    PAST MEDICAL & SURGICAL HISTORY:  Gestational hypertension  Asthma  PCOS (polycystic ovarian syndrome)    MEDICATIONS  (STANDING):  chlorhexidine 4% Liquid 1 Application(s) Topical <User Schedule>  docusate sodium 100 milliGRAM(s) Oral three times a day  labetalol 100 milliGRAM(s) Oral every 6 hours  levETIRAcetam  IVPB 750 milliGRAM(s) IV Intermittent every 12 hours  magnesium sulfate Infusion 1 Gm/Hr (25 mL/Hr) IV Continuous <Continuous>  niCARdipine Infusion 5 mG/Hr (25 mL/Hr) IV Continuous <Continuous>  NIFEdipine IR 10 milliGRAM(s) Oral every 8 hours  niMODipine 30 milliGRAM(s) Oral every 3 hours  pantoprazole    Tablet 40 milliGRAM(s) Oral before breakfast  senna 2 Tablet(s) Oral at bedtime  sodium chloride 0.9%. 1000 milliLiter(s) (50 mL/Hr) IV Continuous <Continuous>    MEDICATIONS  (PRN):  acetaminophen   Tablet .. 650 milliGRAM(s) Oral every 6 hours PRN Moderate Pain (4 - 6)  ondansetron Injectable 4 milliGRAM(s) IV Push every 4 hours PRN Nausea and/or Vomiting  traMADol 25 milliGRAM(s) Oral every 6 hours PRN Severe Pain (7 - 10)    Allergies    Alcohol (Flushing)  Tamiflu (Unknown)    Intolerances  none [id number]     POHx:  JOHANNE: 1 year  Refractive Error OU  (-)injuries/surgeries    Ocular Medications: none    FOHx: Non-contributory    VISUAL ACUITY  OD: sc 20/20   OS  sc 20/20-1     NEURO TESTING  Pupils: 	PERRL, (-) APD OD/OS  EOMS: 	FULL OD/OS  CVF: 	Full to finger counting OD/OS    ANTERIOR SEGMENT EVALUATION:   lids/lashes: 	clear OD/OS  conjunctiva: 	clear OD/OS  cornea: 	            clear OD/OS  anterior chamber: deep & formed OD/OS  iris: 	flat and even OD/OS    INTRAOCULAR PRESSURE  Digital  OD: soft  OS: soft  @10:45am    undilated POSTERIOR SEGMENT EVALUATION c 20D and BIO  Lens: 		clear OD/OS  Vitreous: 	clear OD/OS  Optic nerve:	distinct margins OD/OS  Macula: 	flat, even OD/OS

## 2019-01-03 NOTE — PROGRESS NOTE ADULT - SUBJECTIVE AND OBJECTIVE BOX
S/Overnight events:          34 y/o female  who presents 2 days post partum with hx of gestational hypertension, PCOS and asthma transferred from [facility] for management of SAH. As per patient she developed severe headache since 3:30pm on day of presentation. Headache started while patient laying down and described as sudden in onset , diffuse , 8/10 in intensity and progressively worsening since afternoon a/w photophobia , nausea but no vomiting. Headache worsens with head movements. CTH at [facility] showed SAH for which the neurosurgeon at [facility] Dr. Calderon discussed case with dr fontanez and patient was transferred to [facility] for possible intervention like coiling in am .   No history of similar events in past      HD # 5 Non traumatic SAH     Patient seen and examined at bedside pt alert follows commands has c/o some headache.  Pain scale 5   follows commands     Vital Signs Last 24 Hrs  T(C): 38.3 (2019 08:00), Max: 38.3 (2019 08:00)  T(F): 100.9 (2019 08:00), Max: 100.9 (2019 08:00)  HR: 96 (2019 11:00) (84 - 120)  BP: 128/60 (2019 11:00) (84/41 - 148/61)  BP(mean): 83 (2019 11:00) (50 - 97)  RR: 28 (2019 11:00) (15 - 29)  SpO2: 96% (2019 11:00) (87% - 99%)    I&O's Detail    2019 07:01  -  2019 07:00  --------------------------------------------------------  IN:    IV PiggyBack: 100 mL    magnesium sulfate  Infusion: 287.5 mL    niCARdipine Infusion: 1382.5 mL    Oral Fluid: 400 mL    sodium chloride 0.9%: 1050 mL  Total IN: 3220 mL    OUT:    Voided: 1725 mL  Total OUT: 1725 mL    Total NET: 1495 mL      2019 07:01  -  2019 12:12  --------------------------------------------------------  IN:    magnesium sulfate  Infusion: 50 mL    niCARdipine Infusion: 150 mL    sodium chloride 0.9%: 200 mL  Total IN: 400 mL    OUT:    Voided: 300 mL  Total OUT: 300 mL    Total NET: 100 mL        I&O's Summary    2019 07:  -  2019 07:00  --------------------------------------------------------  IN: 3220 mL / OUT: 1725 mL / NET: 1495 mL    2019 07:01  -  2019 12:12  --------------------------------------------------------  IN: 400 mL / OUT: 300 mL / NET: 100 mL        PHYSICAL EXAM:  A&OX [facility]  EOM( I )   MAEX4   MS 5/5 bilateral Ue"s        5/5 bilateral LE's       LABS:                        11.5   8.77  )-----------( 224      ( 2019 05:01 )             36.0     01-03    140  |  104  |  16  ----------------------------<  95  4.1   |  19  |  0.6<L>    Ca    7.5<L>      2019 05:01  Phos  4.0     01-02  Mg     3.3     01-03    TPro  5.6<L>  /  Alb  3.2<L>  /  TBili  0.4  /  DBili  x   /  AST  15  /  ALT  12  /  AlkPhos  85  01-03    PT/INR - ( 2019 05:01 )   PT: 10.70 sec;   INR: 0.93 ratio         PTT - ( 2019 05:16 )  PTT:31.4 sec  Urinalysis Basic - ( 2019 21:45 )    Color: Yellow / Appearance: Cloudy / S.020 / pH: x  Gluc: x / Ketone: Negative  / Bili: Negative / Urobili: 0.2 mg/dL   Blood: x / Protein: 30 mg/dL / Nitrite: Negative   Leuk Esterase: Small / RBC: >50 /HPF / WBC 10-25 /HPF   Sq Epi: x / Non Sq Epi: Few /HPF / Bacteria: Moderate /HPF      Allergies    Alcohol (Flushing)  Tamiflu (Unknown)    Intolerances      MEDICATIONS:  Antibiotics:    Neuro:  acetaminophen   Tablet .. 650 milliGRAM(s) Oral every 6 hours PRN  levETIRAcetam  IVPB 750 milliGRAM(s) IV Intermittent every 12 hours  magnesium sulfate Infusion 1 Gm/Hr IV Continuous <Continuous>  ondansetron Injectable 4 milliGRAM(s) IV Push every 4 hours PRN  traMADol 25 milliGRAM(s) Oral every 6 hours PRN    Anticoagulation:    OTHER:  chlorhexidine 4% Liquid 1 Application(s) Topical <User Schedule>  docusate sodium 100 milliGRAM(s) Oral three times a day  labetalol 100 milliGRAM(s) Oral every 6 hours  niCARdipine Infusion 5 mG/Hr IV Continuous <Continuous>  NIFEdipine IR 10 milliGRAM(s) Oral every 8 hours  niMODipine 30 milliGRAM(s) Oral every 3 hours  pantoprazole    Tablet 40 milliGRAM(s) Oral before breakfast  senna 2 Tablet(s) Oral at bedtime    IVF:  sodium chloride 0.9%. 1000 milliLiter(s) IV Continuous <Continuous>    CULTURES:    RADIOLOGY & ADDITIONAL TESTS:    < from: MR Venogram Head w/wo IV Cont (19 @ 11:41) >  IMPRESSION:    1.  Segmental (about 2.5 cm in length) moderate-severe narrowing of the   right distal transverse sinus suspicious for venous sinus thrombosis.    2.  1.9 cm ovoid structure within the left frontotemporal region   corresponding to the appearance of a small hematoma on CT. The appearance   is suspicious for an underlying vascular malformation. Consider further   evaluation with contrast-enhanced brain MRI.    < from: MR Head w/ IV Cont (19 @ 21:24) >  1.  1 cm left temporal superficial cavernoma. Consider the possibility   that the cavernoma bled into the adjacent sulcus, causing SAH    2.  Possible adjacent small developmental venous anomaly (correlation   with conventional angiogram )    3.  No evidence for cavernous sinus thrombosis    4.  Thinning of the right lateral transverse sinus. This is in an area   where there is an arachnoid granulation. (Correlation with CT scan).   Consider altered flow dynamics or stenosis, less likely intraluminal     < end of copied text >        ASSESSMENT:  33y Female s/p    SAH  ^SAH  Family history of hypertension (Mother)  Family history of high cholesterol (Father)  Handoff  Gestational hypertension  Asthma  PCOS (polycystic ovarian syndrome)        A/p         Non  traumatic SAH         Angiogram 1 week         neuro checks         will d/w Dr Gamino plan

## 2019-01-03 NOTE — CONSULT NOTE ADULT - ASSESSMENT
32yo now P2, admitted to MICU for subarachnoid hemorrhage, with h/o GHTN, likely preeclampsia with severe features, currently on keppra and magnesium for seizure prophylaxis and on multiple BP meds for BP control.   -recommend sending urine protein:creatinine ratio to r/o preeclampsia (significant proteinuria is considered when up:cr is greater than or equal to 0.3)  -recommend magnesium for seizure prophylaxis for 24hr postpartum (pt is past this window) as the highest risk of eclampsia is highest in first 24hr postpartum   -recommend to control BPs, as per current management per primary team   -Ob/Gyn team to follow  -reconsult PRN    Dr. Calix aware.

## 2019-01-03 NOTE — CONSULT NOTE ADULT - SUBJECTIVE AND OBJECTIVE BOX
BALDEV KEE   33y  [id number]    PGY4 Note:    HPI: Pt is a 32yo  s/p  at [facility] on , now PPD6, pregnancy complicated by GHTN per pt, never diagnosed with preeclampsia or placed on magnesium postpartum. Pt reports that her Ob started her on Labetalol 100mg QD starting at 37wks' GA due to elevated BPs and then was admitted for IOL for BPs of -160s in the office. Pt had epidural for pain management in labor and reports that the procedure was uncomplicated, as was the vaginal delivery. Postpartum, pt was started on Procardia 30XL QD and Labetalol 200mg BID and BPs were well-controleld -140s; pt was discharged home on PPD2 on . However, pt was unable to  her medications until . At home, pt reports that she was with her child, needed to use the restroom to urinate, and when she got up, she had sudden onset global headache, "fire-like" at approximately 1500. Pt's  called EMS and and pt took her dose of procardia 30XL shortly thereafter, but forgot about the Labetalol. EMS came to her house, told her she was dehydrated, gave her tylenol and left. Pt's headache persisted and went to [facility], where she reportedly was transferred to L&D quickly. Per verbal from [facility] resident Dr. Vera, pt had severe headache with slight AMS described as "grogginess". 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 Pt was immediately then transferred to [facility] for coiling.     Currently, pt reports right temporal headache, throbbing in nature, 4-5/10 intensity, worse with sitting up, associated with mild photophobia and fatigue. Denies n/v, blurred vision, chest pain, RUQ/epigastric pain. Denies any similar episodes in past or reports of any seizure or seizure-like activity. Of note, patient has family history significant for ruptured brain aneurysm in her paternal grandmother.      OB/GYN HISTORY:   - FTNSVD x2 (G1 - GHTN postpartum, no po meds; G2 - GHTN, started on procardia 30XL and labetalol 200mg BID)  Gyn: denies history of abnormal pap, STI, or uterine fibroids. H/o PCOS    PAST MEDICAL HISTORY: Asthma (on albuterol PRN - last used 2 weeks ago)    PAST SURGICAL HISTORY: Denies     FAMILY HISTORY: Non-contributory    SOCIAL HISTORY:  Denies cigarette use, alcohol, or other drugs    Allergies: Alcohol (Flushing), Tamiflu (Unknown)    REVIEW OF SYSTEMS:   CONSTITUTIONAL: No weakness, fevers or chills  EYES/ENT: No visual changes;  No vertigo or throat pain   NECK: No pain or stiffness  RESPIRATORY: No cough, wheezing, hemoptysis; No shortness of breath  CARDIOVASCULAR: No chest pain or palpitations  GASTROINTESTINAL: No abdominal or epigastric pain. No nausea, vomiting, or hematemesis; No diarrhea or constipation. No melena or hematochezia.  GENITOURINARY: No dysuria, frequency or hematuria. Minimal vaginal bleeding.   NEUROLOGICAL: No numbness or weakness  SKIN: No itching, rashes  All other negative    MEDICATIONS  (STANDING):  acetaminophen   Tablet .. 650 milliGRAM(s) Oral every 4 hours  chlorhexidine 4% Liquid 1 Application(s) Topical <User Schedule>  docusate sodium 100 milliGRAM(s) Oral three times a day  labetalol 100 milliGRAM(s) Oral every 6 hours  levETIRAcetam  IVPB 750 milliGRAM(s) IV Intermittent every 12 hours  niCARdipine Infusion 5 mG/Hr (25 mL/Hr) IV Continuous <Continuous>  NIFEdipine IR 10 milliGRAM(s) Oral every 8 hours  niMODipine 30 milliGRAM(s) Oral every 3 hours  pantoprazole    Tablet 40 milliGRAM(s) Oral before breakfast  senna 2 Tablet(s) Oral at bedtime  sodium chloride 0.9%. 1000 milliLiter(s) (50 mL/Hr) IV Continuous <Continuous>    MEDICATIONS  (PRN):  ondansetron Injectable 4 milliGRAM(s) IV Push every 4 hours PRN Nausea and/or Vomiting  traMADol 50 milliGRAM(s) Oral every 6 hours PRN Severe Pain (7 - 10)      PHYSICAL EXAM:  Vital Signs Last 24 Hrs  T(C): 38.4 (2019 12:00), Max: 38.4 (2019 12:00)  T(F): 101.1 (2019 12:00), Max: 101.1 (2019 12:00)  HR: 96 (2019 12:30) (84 - 120)  BP: 141/67 (2019 12:30) (84/41 - 148/61)  BP(mean): 83 (2019 12:30) (50 - 97)  RR: 20 (2019 12:30) (16 - 28)  SpO2: 95% (2019 12:30) (87% - 99%)    Constitutional: AAOx3, NAD  Respiratory: CTAB  Heart: s1s2, rrr  Abdomen: soft, nontender, nondistended, no ruq/epigastric tenderness   Pelvic: minimal lochia rubra  Ext: neg calf tenderness b/l    LABS:                        11.5   8.77  )-----------( 224      ( 2019 05:01 )             36.0         01-03    140  |  104  |  16  ----------------------------<  95  4.1   |  19  |  0.6<L>    Ca    7.5<L>      2019 05:01  Phos  4.0     01-02  Mg     3.3     01-03    TPro  5.6<L>  /  Alb  3.2<L>  /  TBili  0.4  /  DBili  x   /  AST  15  /  ALT  12  /  AlkPhos  85  01-03    PT/INR - ( 2019 05:01 )   PT: 10.70 sec;   INR: 0.93 ratio         PTT - ( 2019 05:16 )  PTT:31.4 sec  Urinalysis Basic - ( 2019 21:45 )    Color: Yellow / Appearance: Cloudy / S.020 / pH: x  Gluc: x / Ketone: Negative  / Bili: Negative / Urobili: 0.2 mg/dL   Blood: x / Protein: 30 mg/dL / Nitrite: Negative   Leuk Esterase: Small / RBC: >50 /HPF / WBC 10-25 /HPF   Sq Epi: x / Non Sq Epi: Few /HPF / Bacteria: Moderate /HPF      RADIOLOGY & ADDITIONAL STUDIES: < from: CT Angio Head w/ IV Cont (18 @ 23:18) >  IMPRESSION:     No large vessel occlusion or significant stenosis of the vessels of the   head and neck.    No definite vascular malformation is identified.    < end of copied text >      < from: CT Head No Cont (.18 @ 22:56) >  IMPRESSION:    1.  Diffuse subarachnoid hemorrhage, with a more focal round hematoma   within the left frontotemporal region.    2.  Mild hydrocephalus.    < end of copied text >    < from: MR Head w/ IV Cont (19 @ 21:24) >  Impression:    1.  1 cm left temporal superficial cavernoma. Consider the possibility   that the cavernoma bled into the adjacent sulcus, causing SAH    2.  Possible adjacent small developmental venous anomaly (correlation   with conventional angiogram )    3.  No evidence for cavernous sinus thrombosis    4.  Thinning of the right lateral transverse sinus. This is in an area   where there is an arachnoid granulation. (Correlation with CT scan).   Consider altered flow dynamics or stenosis, less likely intraluminal   thrombus    < end of copied text >    < from: MR Venogram Head w/wo IV Cont (19 @ 11:41) >  IMPRESSION:    1.  Segmental (about 2.5 cm in length) moderate-severe narrowing of the   right distal transverse sinus suspicious for venous sinus thrombosis.    2.  1.9 cm ovoid structure within the left frontotemporal region   corresponding to the appearance of a small hematoma on CT. The appearance   is suspicious for an underlying vascular malformation. Consider further   evaluation with contrast-enhanced brain MRI.    < end of copied text >

## 2019-01-03 NOTE — PROGRESS NOTE ADULT - SUBJECTIVE AND OBJECTIVE BOX
________________________________________________________________________________  DAILY PROGRESS NOTE:    ================== SUBJECTIVE ==================    BALDEV KEE  /   33y  /  Female  /  MRN#: [id number]  Patient is a 33y old Female who presents with a chief complaint of SAH , transfer from [facility] (2019 10:55)  Currently admitted to medicine with the primary diagnosis of     HOSPITAL DAY: 4d     ICU DAY: 4d    SUMMARY OF HOSPITAL COURSE TO DATE     32 y/o female  w/ PMH of Gestational HTN, PCOS and asthma transferred from [facility] for management of SAH. As per patient she developed severe headache since 3:30pm on day of presentation. Headache started while patient laying down and described as sudden in onset , diffuse , 8/10 in intensity and progressively worsening since afternoon a/w photophobia , nausea but no vomiting. Headache worsens with head movements. CTH at [facility] showed SAH for which the neurosurgeon at [facility] Dr. Calderon discussed case with dr fontanez and patient was transferred to [facility] for possible intervention.     At [facility], pt underwent cerebral angiogram. Pt underwent MRV after and was found to have ?venous sinus thrombosis on MRV. As per neurology, it can be secondary to artifact. Neurology wanted a repeat MRI with IV contrast.     ICU day 3, pt still has significant headache which improved compared to admission. Pt is currently awaiting for MRI with IV contrast.     Day 4 (1/3), pt's headache improved. Pt underwent MRI w/ contrast yesterday. Spoke with neurology team, concerned for preclampsia post partum and required ob consult. Still required ICU monitoring.       OVERNIGHT EVENTS: was hypertensive, added labetalol    TODAY: Patient was seen this morning at bedside. Currently, the patient reports no headache today.     Review of systems is otherwise negative.    ================= PRESENT TODAY ==================    1-Damon Catheter: no  2-Central Line: no  3-IV Fluids: NS @ 50  4-Drips: Mg @ 1mg/hr, Nicardipine @ 5mg/hr    =================== OBJECTIVE ===================    VITAL SIGNS: Last 24 Hours  T(C): 38.3 (2019 08:00), Max: 38.3 (2019 08:00)  T(F): 100.9 (2019 08:00), Max: 100.9 (2019 08:00)  HR: 96 (2019 11:00) (84 - 120)  BP: 128/60 (2019 11:00) (84/41 - 148/61)  BP(mean): 83 (2019 11:00) (50 - 97)  RR: 28 (2019 11:00) (15 - 29)  SpO2: 96% (2019 11:00) (87% - 99%)    19 @ 07:19 @ 07:00  --------------------------------------------------------  IN: 3220 mL / OUT: 1725 mL / NET: 1495 mL    19 @ 07:19 @ 11:24  --------------------------------------------------------  IN: 400 mL / OUT: 300 mL / NET: 100 mL      PHYSICAL EXAM:  GEN: NAD  LUNGS: CTAB, no w/r/r  HEART: RRR, no murmur auscultated  ABD: soft, non-tender +BS  EXT: no edema, PP b/l  NEURO: AAOx3, neur exam remains non-focal    ===================== LABS =====================                        11.5   8.77  )-----------( 224      ( 2019 05:01 )             36.0     01-03    140  |  104  |  16  ----------------------------<  95  4.1   |  19  |  0.6<L>    Ca    7.5<L>      2019 05:01  Phos  4.0     01-02  Mg     3.3     -03    TPro  5.6<L>  /  Alb  3.2<L>  /  TBili  0.4  /  DBili  x   /  AST  15  /  ALT  12  /  AlkPhos  85  03    PT/INR - ( 2019 05:01 )   PT: 10.70 sec;   INR: 0.93 ratio         PTT - ( 2019 05:16 )  PTT:31.4 sec  Urinalysis Basic - ( 2019 21:45 )    Color: Yellow / Appearance: Cloudy / S.020 / pH: x  Gluc: x / Ketone: Negative  / Bili: Negative / Urobili: 0.2 mg/dL   Blood: x / Protein: 30 mg/dL / Nitrite: Negative   Leuk Esterase: Small / RBC: >50 /HPF / WBC 10-25 /HPF   Sq Epi: x / Non Sq Epi: Few /HPF / Bacteria: Moderate /HPF    ================= MICROBIOLOGY ================    ================== IMAGING TO DATE ==================    < from: MR Head w/ IV Cont (19 @ 21:24) >  Impression:  1.  1 cm left temporal superficial cavernoma. Consider the possibility   that the cavernoma bled into the adjacent sulcus, causing SAH  2.  Possible adjacent small developmental venous anomaly (correlation   with conventional angiogram )  3.  No evidence for cavernous sinus thrombosis  4.  Thinning of the right lateral transverse sinus. This is in an area   where there is an arachnoid granulation. (Correlation with CT scan).   Consider altered flow dynamics or stenosis, less likely intraluminal   thrombus  < end of copied text >    ================== OTHER SIGNIFICANT FINDINGS ==================    ================== ALLERGIES ===================  Alcohol (Flushing)  Tamiflu (Unknown)    ==================== MEDS =====================  chlorhexidine 4% Liquid 1 Application(s) Topical <User Schedule>  docusate sodium 100 milliGRAM(s) Oral three times a day  labetalol 100 milliGRAM(s) Oral every 6 hours  levETIRAcetam  IVPB 750 milliGRAM(s) IV Intermittent every 12 hours  magnesium sulfate Infusion 1 Gm/Hr IV Continuous <Continuous>  niCARdipine Infusion 5 mG/Hr IV Continuous <Continuous>  NIFEdipine IR 10 milliGRAM(s) Oral every 8 hours  niMODipine 30 milliGRAM(s) Oral every 3 hours  pantoprazole    Tablet 40 milliGRAM(s) Oral before breakfast  senna 2 Tablet(s) Oral at bedtime  sodium chloride 0.9%. 1000 milliLiter(s) IV Continuous <Continuous>    PRN MEDICATIONS  acetaminophen   Tablet .. 650 milliGRAM(s) Oral every 6 hours PRN  ondansetron Injectable 4 milliGRAM(s) IV Push every 4 hours PRN  traMADol 25 milliGRAM(s) Oral every 6 hours PRN      ================= CONSULTS ==================    Neurology (1/3)  Continue Cardene goal -130.  Add Labetolol 100 mg PO q6 hours.  Add procardia 10 mg q8 hours.  Continue Nimotipine.  Continue Magnesium sulfate drip, increase dose to 1 gram per hour and check serum Magnesium level q6 hours. Check reflexes q4 hours for depression of reflexes due to hypermagnesemia.  Keep magnesium >2, less than or equal to 4.  Continue IVF.  Continue Keppra.  Please avoid morphine for pain, suggest toradol.  Seizure precautions.  DVT prophalaxis.  Consult OB GYN STAT for evaluation of preeclampsia.  Consult Opthalmology, rule out papilledema.       ================= ASSESS/PLAN ==================  Patient is a 33y old Female who presents with a chief complaint of SAH , transfer from [facility] (2019 10:55)  Currently admitted to medicine with the primary diagnosis of SAH.

## 2019-01-03 NOTE — PROGRESS NOTE ADULT - ASSESSMENT
IMPRESSION:    SAH that occured 5 days ago s/p angio that did not show aneurysm  R/O Venous sinus thrombosis      PLAN:    CNS: keppra,,nimodipine, interventional neuro f/u. F/U MRI results.    HEENT: Oral care    PULMONARY:  HOB @ 45 degrees, keep spo2 more than 92%    CARDIOVASCULAR: SBP less than 140mmhg. DC IV fluids.    GI: GI prophylaxis.  Feeding PO diet as tolerated    RENAL:  Follow up lytes.  Correct as needed. MG between 2 and 4 as per neurology.    INFECTIOUS DISEASE: Follow up cultures    HEMATOLOGICAL:  DVT prophylaxis: mechanical DVT prophylaxis.     ENDOCRINE:  Follow up FS.  Insulin protocol if needed.    MUSCULOSKELETAL: out of bed     Monitor in ICU as per neurology

## 2019-01-03 NOTE — CONSULT NOTE ADULT - ASSESSMENT
1) No sign of papilledema to extent seen  - Pt on q1h neurochecks - no dilation performed   - vision 20/20 OD/OS, no diplopia, visual field within normal range and optic nerve grossly normal  - Pt and medical staff advised to re-consult if ocular status or vision changes or as needed when patient is off neuro checks.

## 2019-01-03 NOTE — PROGRESS NOTE ADULT - SUBJECTIVE AND OBJECTIVE BOX
Patient is a 33y old  Female who presents with a chief complaint of SAH , transfer from [facility] (2019 02:00)        Over Night Events:    MRI with contrast done pending report  Still complains of headache but improved        ROS:  See HPI    PHYSICAL EXAM    ICU Vital Signs Last 24 Hrs  T(C): 38.3 (2019 08:00), Max: 38.3 (2019 08:00)  T(F): 100.9 (2019 08:00), Max: 100.9 (2019 08:00)  HR: 94 (2019 08:15) (84 - 120)  BP: 117/71 (2019 08:15) (84/41 - 148/61)  BP(mean): 85 (2019 08:15) (50 - 97)  ABP: --  ABP(mean): --  RR: 24 (2019 08:15) (15 - 29)  SpO2: 95% (2019 08:15) (87% - 99%)      General: NAD, eyes closed  HEENT: GISSELLE             Lymphatic system: No cervical LN   Lungs: Bilateral BS  Cardiovascular: Regular   Gastrointestinal: Soft, Positive BS  Extremities: No clubbing.  Moves extremities.  Full Range of motion   Skin: Warm, intact  Neurological: No motor or sensory deficit, alert and awake      19 @ 07:01  -  19 @ 07:00  --------------------------------------------------------  IN:    IV PiggyBack: 100 mL    magnesium sulfate  Infusion: 287.5 mL    niCARdipine Infusion: 1382.5 mL    Oral Fluid: 400 mL    sodium chloride 0.9%.: 1050 mL  Total IN: 3220 mL    OUT:    Voided: 1725 mL  Total OUT: 1725 mL    Total NET: 1495 mL      19 @ 07:01  -  19 @ 09:41  --------------------------------------------------------  IN:    magnesium sulfate  Infusion: 12.5 mL    niCARdipine Infusion: 37.5 mL    sodium chloride 0.9%.: 50 mL  Total IN: 100 mL    OUT:    Voided: 300 mL  Total OUT: 300 mL    Total NET: -200 mL          LABS:                            11.5   8.77  )-----------( 224      ( 2019 05:01 )             36.0                                               01-03    140  |  104  |  16  ----------------------------<  95  4.1   |  19  |  0.6<L>    Ca    7.5<L>      2019 05:01  Phos  4.0     01-02  Mg     3.3     01-03    TPro  5.6<L>  /  Alb  3.2<L>  /  TBili  0.4  /  DBili  x   /  AST  15  /  ALT  12  /  AlkPhos  85  01-03      PT/INR - ( 2019 05:01 )   PT: 10.70 sec;   INR: 0.93 ratio         PTT - ( 2019 05:16 )  PTT:31.4 sec                                       Urinalysis Basic - ( 2019 21:45 )    Color: Yellow / Appearance: Cloudy / S.020 / pH: x  Gluc: x / Ketone: Negative  / Bili: Negative / Urobili: 0.2 mg/dL   Blood: x / Protein: 30 mg/dL / Nitrite: Negative   Leuk Esterase: Small / RBC: >50 /HPF / WBC 10-25 /HPF   Sq Epi: x / Non Sq Epi: Few /HPF / Bacteria: Moderate /HPF                                                  LIVER FUNCTIONS - ( 2019 05:01 )  Alb: 3.2 g/dL / Pro: 5.6 g/dL / ALK PHOS: 85 U/L / ALT: 12 U/L / AST: 15 U/L / GGT: x                                                                                                                                       MEDICATIONS  (STANDING):  chlorhexidine 4% Liquid 1 Application(s) Topical <User Schedule>  docusate sodium 100 milliGRAM(s) Oral three times a day  labetalol 200 milliGRAM(s) Oral every 8 hours  levETIRAcetam  IVPB 750 milliGRAM(s) IV Intermittent every 12 hours  magnesium sulfate  IVPB 2 Gram(s) IV Intermittent once  magnesium sulfate Infusion 0.5 Gm/Hr (12.5 mL/Hr) IV Continuous <Continuous>  niCARdipine Infusion 5 mG/Hr (25 mL/Hr) IV Continuous <Continuous>  niMODipine 30 milliGRAM(s) Oral every 3 hours  pantoprazole    Tablet 40 milliGRAM(s) Oral before breakfast  senna 2 Tablet(s) Oral at bedtime  sodium chloride 0.9%. 1000 milliLiter(s) (50 mL/Hr) IV Continuous <Continuous>    MEDICATIONS  (PRN):  acetaminophen   Tablet .. 650 milliGRAM(s) Oral every 6 hours PRN Moderate Pain (4 - 6)  morphine  - Injectable 2 milliGRAM(s) IV Push every 4 hours PRN Severe Pain (7 - 10)  ondansetron Injectable 4 milliGRAM(s) IV Push every 4 hours PRN Nausea and/or Vomiting      Xrays:                                                                                     ECHO

## 2019-01-03 NOTE — PROGRESS NOTE ADULT - ASSESSMENT
33 year old woman  presented 2 days post partum with a history of gestational hypertension, PCOS and asthma transferred from [facility] for management of SAH. As per patient she developed severe headache on day of presentation. Headache started while patient laying down and described as sudden in onset, diffuse , 8/10 in intensity and progressively worsened since onset a/w photophobia , nausea but no vomiting. CTH at [facility] showed SAH for which the neurosurgeon at [facility] Dr. Calderon discussed case with Dr. Rider and patient was transferred to [facility] for possible intervention. 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 Yesterday she required more Cardene to keep a desired blood pressure range. She also developed a worsened headache that required Morphine. MRV head revealed a segment of (about 2.5 cm in length) moderate-severe narrowing of the right distal transverse sinus suspicious for venous sinus thrombosis; however this was not confirmed in the conventional angiogram. 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 MRA head failed to reveal vasospasm. Today she developed fever but her WBC is trending down in compare to yesterday. In general she reports that she is improving on daily basis since she was transferred to this center. Her exam is nonfocal, but still has photo&phonophobia as well as left>right sided headache which is worsening by head motion.    Suggestions:  Continue Cardene drip with the goal SBP of 100-130.  Add Labetolol 100 mg PO q6 hours(her outpt med).  Add procardia 10 mg q8 hours(her outpt med).  Continue Nimotipine 30 q3h.  Continue Magnesium sulfate drip, increase dose to 1 gram per hour and check serum Magnesium level q6 hours. Check Knee reflexes q4 hours for depression of reflexes due to hypermagnesemia.  Hold magnesium drip if s. Mag is more than or equal to 4.  Continue NS IVF and well hydration.  Continue Keppra.  Please avoid opioid medications for pain, suggest toradol.  Seizure precautions.  Please make sure patient has Sequential DVT prophalaxis on.  Consult OB GYN STAT for evaluation of rare case of preeclampsia after labor.  Consult Opthalmology, rule out papilledema.   Please Call Code Stroke if worsening of neurological symptoms.

## 2019-01-04 LAB
ALBUMIN SERPL ELPH-MCNC: 3.3 G/DL — LOW (ref 3.5–5.2)
ALP SERPL-CCNC: 76 U/L — SIGNIFICANT CHANGE UP (ref 30–115)
ALT FLD-CCNC: 11 U/L — SIGNIFICANT CHANGE UP (ref 0–41)
ANION GAP SERPL CALC-SCNC: 16 MMOL/L — HIGH (ref 7–14)
AST SERPL-CCNC: 13 U/L — SIGNIFICANT CHANGE UP (ref 0–41)
BASOPHILS # BLD AUTO: 0.05 K/UL — SIGNIFICANT CHANGE UP (ref 0–0.2)
BASOPHILS NFR BLD AUTO: 0.7 % — SIGNIFICANT CHANGE UP (ref 0–1)
BILIRUB DIRECT SERPL-MCNC: <0.2 MG/DL — SIGNIFICANT CHANGE UP (ref 0–0.2)
BILIRUB INDIRECT FLD-MCNC: >0.1 MG/DL — LOW (ref 0.2–1.2)
BILIRUB SERPL-MCNC: 0.3 MG/DL — SIGNIFICANT CHANGE UP (ref 0.2–1.2)
BUN SERPL-MCNC: 18 MG/DL — SIGNIFICANT CHANGE UP (ref 10–20)
CALCIUM SERPL-MCNC: 7.6 MG/DL — LOW (ref 8.5–10.1)
CHLORIDE SERPL-SCNC: 102 MMOL/L — SIGNIFICANT CHANGE UP (ref 98–110)
CO2 SERPL-SCNC: 20 MMOL/L — SIGNIFICANT CHANGE UP (ref 17–32)
COLLECT DURATION TIME UR: 24 HR — SIGNIFICANT CHANGE UP
CORTICOSTEROID BINDING GLOBULIN RESULT: 3.4 MG/DL — HIGH
CORTIS F/TOTAL MFR SERPL: 6.1 % — SIGNIFICANT CHANGE UP
CORTIS SERPL-MCNC: 19 UG/DL — SIGNIFICANT CHANGE UP
CORTISOL, FREE RESULT: 1.2 UG/DL — SIGNIFICANT CHANGE UP
CREAT SERPL-MCNC: 0.6 MG/DL — LOW (ref 0.7–1.5)
CULTURE RESULTS: SIGNIFICANT CHANGE UP
EOSINOPHIL # BLD AUTO: 0.3 K/UL — SIGNIFICANT CHANGE UP (ref 0–0.7)
EOSINOPHIL NFR BLD AUTO: 3.9 % — SIGNIFICANT CHANGE UP (ref 0–8)
GLUCOSE SERPL-MCNC: 86 MG/DL — SIGNIFICANT CHANGE UP (ref 70–99)
HCT VFR BLD CALC: 31.8 % — LOW (ref 37–47)
HGB BLD-MCNC: 10.5 G/DL — LOW (ref 12–16)
IMM GRANULOCYTES NFR BLD AUTO: 0.5 % — HIGH (ref 0.1–0.3)
LACTATE SERPL-SCNC: 1.4 MMOL/L — SIGNIFICANT CHANGE UP (ref 0.5–2.2)
LDH SERPL L TO P-CCNC: 257 — HIGH (ref 50–242)
LYMPHOCYTES # BLD AUTO: 1.42 K/UL — SIGNIFICANT CHANGE UP (ref 1.2–3.4)
LYMPHOCYTES # BLD AUTO: 18.5 % — LOW (ref 20.5–51.1)
MAGNESIUM SERPL-MCNC: 2 MG/DL — SIGNIFICANT CHANGE UP (ref 1.8–2.4)
MAGNESIUM SERPL-MCNC: 2.3 MG/DL — SIGNIFICANT CHANGE UP (ref 1.8–2.4)
MAGNESIUM SERPL-MCNC: 2.7 MG/DL — HIGH (ref 1.8–2.4)
MCHC RBC-ENTMCNC: 28.8 PG — SIGNIFICANT CHANGE UP (ref 27–31)
MCHC RBC-ENTMCNC: 33 G/DL — SIGNIFICANT CHANGE UP (ref 32–37)
MCV RBC AUTO: 87.4 FL — SIGNIFICANT CHANGE UP (ref 81–99)
MONOCYTES # BLD AUTO: 0.63 K/UL — HIGH (ref 0.1–0.6)
MONOCYTES NFR BLD AUTO: 8.2 % — SIGNIFICANT CHANGE UP (ref 1.7–9.3)
NEUTROPHILS # BLD AUTO: 5.25 K/UL — SIGNIFICANT CHANGE UP (ref 1.4–6.5)
NEUTROPHILS NFR BLD AUTO: 68.2 % — SIGNIFICANT CHANGE UP (ref 42.2–75.2)
NRBC # BLD: 0 /100 WBCS — SIGNIFICANT CHANGE UP (ref 0–0)
PLATELET # BLD AUTO: 202 K/UL — SIGNIFICANT CHANGE UP (ref 130–400)
POTASSIUM SERPL-MCNC: 3.9 MMOL/L — SIGNIFICANT CHANGE UP (ref 3.5–5)
POTASSIUM SERPL-SCNC: 3.9 MMOL/L — SIGNIFICANT CHANGE UP (ref 3.5–5)
PROT 24H UR-MRATE: 784 MG/24 H — HIGH (ref 50–100)
PROT SERPL-MCNC: 5.5 G/DL — LOW (ref 6–8)
RBC # BLD: 3.64 M/UL — LOW (ref 4.2–5.4)
RBC # FLD: 13.3 % — SIGNIFICANT CHANGE UP (ref 11.5–14.5)
SODIUM SERPL-SCNC: 138 MMOL/L — SIGNIFICANT CHANGE UP (ref 135–146)
SPECIMEN SOURCE: SIGNIFICANT CHANGE UP
TOTAL VOLUME - 24 HOUR: 2800 ML — SIGNIFICANT CHANGE UP
URATE SERPL-MCNC: 3.4 MG/DL — SIGNIFICANT CHANGE UP (ref 2.5–7)
URINE CREATININE CALCULATION: 1.8 G/24 HR — SIGNIFICANT CHANGE UP (ref 0.8–1.8)
WBC # BLD: 7.69 K/UL — SIGNIFICANT CHANGE UP (ref 4.8–10.8)
WBC # FLD AUTO: 7.69 K/UL — SIGNIFICANT CHANGE UP (ref 4.8–10.8)

## 2019-01-04 PROCEDURE — 99232 SBSQ HOSP IP/OBS MODERATE 35: CPT

## 2019-01-04 RX ORDER — MAGNESIUM OXIDE 400 MG ORAL TABLET 241.3 MG
400 TABLET ORAL
Qty: 0 | Refills: 0 | Status: DISCONTINUED | OUTPATIENT
Start: 2019-01-04 | End: 2019-01-10

## 2019-01-04 RX ORDER — MAGNESIUM SULFATE 500 MG/ML
1 VIAL (ML) INJECTION
Qty: 40 | Refills: 0 | Status: DISCONTINUED | OUTPATIENT
Start: 2019-01-04 | End: 2019-01-04

## 2019-01-04 RX ORDER — CEFTRIAXONE 500 MG/1
1 INJECTION, POWDER, FOR SOLUTION INTRAMUSCULAR; INTRAVENOUS ONCE
Qty: 0 | Refills: 0 | Status: COMPLETED | OUTPATIENT
Start: 2019-01-04 | End: 2019-01-04

## 2019-01-04 RX ORDER — LABETALOL HCL 100 MG
150 TABLET ORAL EVERY 6 HOURS
Qty: 0 | Refills: 0 | Status: DISCONTINUED | OUTPATIENT
Start: 2019-01-04 | End: 2019-01-06

## 2019-01-04 RX ORDER — CEFTRIAXONE 500 MG/1
1 INJECTION, POWDER, FOR SOLUTION INTRAMUSCULAR; INTRAVENOUS EVERY 24 HOURS
Qty: 0 | Refills: 0 | Status: DISCONTINUED | OUTPATIENT
Start: 2019-01-05 | End: 2019-01-08

## 2019-01-04 RX ORDER — LABETALOL HCL 100 MG
150 TABLET ORAL EVERY 6 HOURS
Qty: 0 | Refills: 0 | Status: DISCONTINUED | OUTPATIENT
Start: 2019-01-04 | End: 2019-01-04

## 2019-01-04 RX ORDER — CEFTRIAXONE 500 MG/1
INJECTION, POWDER, FOR SOLUTION INTRAMUSCULAR; INTRAVENOUS
Qty: 0 | Refills: 0 | Status: DISCONTINUED | OUTPATIENT
Start: 2019-01-04 | End: 2019-01-08

## 2019-01-04 RX ADMIN — NICARDIPINE HYDROCHLORIDE 25 MG/HR: 30 CAPSULE, EXTENDED RELEASE ORAL at 08:26

## 2019-01-04 RX ADMIN — SODIUM CHLORIDE 50 MILLILITER(S): 9 INJECTION INTRAMUSCULAR; INTRAVENOUS; SUBCUTANEOUS at 08:26

## 2019-01-04 RX ADMIN — Medication 650 MILLIGRAM(S): at 21:11

## 2019-01-04 RX ADMIN — Medication 10 MILLIGRAM(S): at 22:28

## 2019-01-04 RX ADMIN — Medication 150 MILLIGRAM(S): at 19:03

## 2019-01-04 RX ADMIN — NIMODIPINE 30 MILLIGRAM(S): 60 SOLUTION ORAL at 21:11

## 2019-01-04 RX ADMIN — Medication 100 MILLIGRAM(S): at 05:15

## 2019-01-04 RX ADMIN — Medication 10 MILLIGRAM(S): at 05:15

## 2019-01-04 RX ADMIN — NIMODIPINE 30 MILLIGRAM(S): 60 SOLUTION ORAL at 15:28

## 2019-01-04 RX ADMIN — CEFTRIAXONE 100 GRAM(S): 500 INJECTION, POWDER, FOR SOLUTION INTRAMUSCULAR; INTRAVENOUS at 11:21

## 2019-01-04 RX ADMIN — Medication 650 MILLIGRAM(S): at 09:09

## 2019-01-04 RX ADMIN — NIMODIPINE 30 MILLIGRAM(S): 60 SOLUTION ORAL at 08:27

## 2019-01-04 RX ADMIN — Medication 100 MILLIGRAM(S): at 21:12

## 2019-01-04 RX ADMIN — Medication 650 MILLIGRAM(S): at 02:29

## 2019-01-04 RX ADMIN — MAGNESIUM OXIDE 400 MG ORAL TABLET 400 MILLIGRAM(S): 241.3 TABLET ORAL at 19:02

## 2019-01-04 RX ADMIN — LEVETIRACETAM 400 MILLIGRAM(S): 250 TABLET, FILM COATED ORAL at 06:16

## 2019-01-04 RX ADMIN — Medication 150 MILLIGRAM(S): at 11:16

## 2019-01-04 RX ADMIN — NIMODIPINE 30 MILLIGRAM(S): 60 SOLUTION ORAL at 17:29

## 2019-01-04 RX ADMIN — Medication 100 MILLIGRAM(S): at 05:16

## 2019-01-04 RX ADMIN — Medication 650 MILLIGRAM(S): at 13:47

## 2019-01-04 RX ADMIN — CHLORHEXIDINE GLUCONATE 1 APPLICATION(S): 213 SOLUTION TOPICAL at 05:16

## 2019-01-04 RX ADMIN — Medication 650 MILLIGRAM(S): at 13:23

## 2019-01-04 RX ADMIN — TRAMADOL HYDROCHLORIDE 50 MILLIGRAM(S): 50 TABLET ORAL at 23:49

## 2019-01-04 RX ADMIN — MAGNESIUM OXIDE 400 MG ORAL TABLET 400 MILLIGRAM(S): 241.3 TABLET ORAL at 12:24

## 2019-01-04 RX ADMIN — NIMODIPINE 30 MILLIGRAM(S): 60 SOLUTION ORAL at 02:28

## 2019-01-04 RX ADMIN — Medication 650 MILLIGRAM(S): at 02:44

## 2019-01-04 RX ADMIN — Medication 650 MILLIGRAM(S): at 18:02

## 2019-01-04 RX ADMIN — Medication 25 GM/HR: at 08:26

## 2019-01-04 RX ADMIN — Medication 10 MILLIGRAM(S): at 13:23

## 2019-01-04 RX ADMIN — PANTOPRAZOLE SODIUM 40 MILLIGRAM(S): 20 TABLET, DELAYED RELEASE ORAL at 06:15

## 2019-01-04 RX ADMIN — Medication 100 MILLIGRAM(S): at 13:23

## 2019-01-04 RX ADMIN — NIMODIPINE 30 MILLIGRAM(S): 60 SOLUTION ORAL at 05:15

## 2019-01-04 RX ADMIN — SENNA PLUS 2 TABLET(S): 8.6 TABLET ORAL at 21:12

## 2019-01-04 RX ADMIN — Medication 650 MILLIGRAM(S): at 10:56

## 2019-01-04 RX ADMIN — NIMODIPINE 30 MILLIGRAM(S): 60 SOLUTION ORAL at 12:24

## 2019-01-04 RX ADMIN — Medication 650 MILLIGRAM(S): at 05:30

## 2019-01-04 RX ADMIN — LEVETIRACETAM 400 MILLIGRAM(S): 250 TABLET, FILM COATED ORAL at 19:03

## 2019-01-04 RX ADMIN — SODIUM CHLORIDE 50 MILLILITER(S): 9 INJECTION INTRAMUSCULAR; INTRAVENOUS; SUBCUTANEOUS at 20:00

## 2019-01-04 RX ADMIN — Medication 650 MILLIGRAM(S): at 05:15

## 2019-01-04 RX ADMIN — Medication 650 MILLIGRAM(S): at 17:32

## 2019-01-04 NOTE — PROGRESS NOTE ADULT - SUBJECTIVE AND OBJECTIVE BOX
________________________________________________________________________________  DAILY PROGRESS NOTE:    ================== SUBJECTIVE ==================    BALDEV KEE  /   33y  /  Female  /  MRN#: [id number]  Patient is a 33y old Female who presents with a chief complaint of SAH , transfer from [facility] (2019 09:55)  Currently admitted to medicine with the primary diagnosis of     HOSPITAL DAY: 5d     ICU DAY: 5d    SUMMARY OF HOSPITAL COURSE TO DATE     32 y/o female  w/ PMH of Gestational HTN, PCOS and asthma transferred from [facility] for management of SAH. As per patient she developed severe headache since 3:30pm on day of presentation. Headache started while patient laying down and described as sudden in onset , diffuse , 8/10 in intensity and progressively worsening since afternoon a/w photophobia , nausea but no vomiting. Headache worsens with head movements. CTH at [facility] showed SAH for which the neurosurgeon at [facility] Dr. Calderon discussed case with dr fontanez and patient was transferred to [facility] for possible intervention.     At [facility], pt underwent cerebral angiogram. Pt underwent MRV after and was found to have ?venous sinus thrombosis on MRV. As per neurology, it can be secondary to artifact. Neurology wanted a repeat MRI with IV contrast.     ICU day 3, pt still has significant headache which improved compared to admission. Pt is currently awaiting for MRI with IV contrast.     ICU Day 4 (1/3), pt's headache improved. Pt underwent MRI w/ contrast yesterday. Spoke with neurology team, concerned for preclampsia post partum and required ob consult. Ob was consulted and doubted eclampsia given risk highest in the first 24hr postpartum. Pt continued to be on Mg and Nicardipine drip as per neuro to maintain Mg of 2-4 and SBP < 120. Pt was started Tylenol standing q4h to prevent headache. She also spiked a fever of 101 yesterday afternoon. Panculture taken and needed f/u.     ICU Day 5 (), pt's headache resolved. Neurology recommended to downgrade pt to stroke unit. Nicardipine to be d/c and switched to labetalol. Pt started Rocephin for UA positive.     OVERNIGHT EVENTS: none    TODAY: Patient was seen this morning at bedside. Currently, the patient reports feeling better and has no complaints at this time.     Review of systems is otherwise negative.    ================= PRESENT TODAY ==================    1-Damon Catheter: no  2-Central Line: no  3-IV Fluids: NS @ 50  4-Drips: Mg @ 1mg/hr    =================== OBJECTIVE ===================    VITAL SIGNS: Last 24 Hours  T(C): 37.7 (2019 08:00), Max: 38.4 (2019 12:00)  T(F): 99.9 (2019 08:00), Max: 101.1 (2019 12:00)  HR: 82 (2019 09:45) (80 - 102)  BP: 140/62 (2019 09:45) (92/44 - 145/69)  BP(mean): 81 (2019 09:45) (57 - 90)  RR: 26 (2019 09:45) (15 - 33)  SpO2: 99% (2019 09:45) (93% - 99%)    19 @ 07:19 @ 07:00  --------------------------------------------------------  IN: 2515 mL / OUT: 2600 mL / NET: -85 mL    19 @ 07:01  -  19 @ 10:33  --------------------------------------------------------  IN: 200 mL / OUT: 0 mL / NET: 200 mL      PHYSICAL EXAM:  GEN: NAD  LUNGS: CTAB, no w/r/r  HEART: RRR, no murmur auscultated  ABD: soft, non-tender +BS  EXT: no edema, PP b/l  NEURO: AAOx3, neur exam remains non-focal    ===================== LABS =====================                        10.5   7.69  )-----------( 202      ( 2019 04:45 )             31.8     -    138  |  102  |  18  ----------------------------<  86  3.9   |  20  |  0.6<L>    Ca    7.6<L>      2019 04:45  Phos  4.0       Mg     2.3         TPro  5.5<L>  /  Alb  3.3<L>  /  TBili  0.3  /  DBili  <0.2  /  AST  13  /  ALT  11  /  AlkPhos  76      PT/INR - ( 2019 05:01 )   PT: 10.70 sec;   INR: 0.93 ratio           Urinalysis Basic - ( 2019 21:45 )    Color: Yellow / Appearance: Cloudy / S.020 / pH: x  Gluc: x / Ketone: Negative  / Bili: Negative / Urobili: 0.2 mg/dL   Blood: x / Protein: 30 mg/dL / Nitrite: Negative   Leuk Esterase: Small / RBC: >50 /HPF / WBC 10-25 /HPF   Sq Epi: x / Non Sq Epi: Few /HPF / Bacteria: Moderate /HPF    Lactate, Blood: 1.4 mmol/L (19 @ 00:50)        ================= MICROBIOLOGY ================    ================== IMAGING TO DATE ==================    ================== OTHER SIGNIFICANT FINDINGS ==================    ================== ALLERGIES ===================  Alcohol (Flushing)  Tamiflu (Unknown)    ==================== MEDS =====================  acetaminophen   Tablet .. 650 milliGRAM(s) Oral every 4 hours  cefTRIAXone   IVPB 1 Gram(s) IV Intermittent once  cefTRIAXone   IVPB      chlorhexidine 4% Liquid 1 Application(s) Topical <User Schedule>  docusate sodium 100 milliGRAM(s) Oral three times a day  labetalol 100 milliGRAM(s) Oral every 6 hours  levETIRAcetam  IVPB 750 milliGRAM(s) IV Intermittent every 12 hours  magnesium sulfate Infusion 1 Gm/Hr IV Continuous <Continuous>  niCARdipine Infusion 5 mG/Hr IV Continuous <Continuous>  NIFEdipine IR 10 milliGRAM(s) Oral every 8 hours  niMODipine 30 milliGRAM(s) Oral every 3 hours  pantoprazole    Tablet 40 milliGRAM(s) Oral before breakfast  senna 2 Tablet(s) Oral at bedtime  sodium chloride 0.9%. 1000 milliLiter(s) IV Continuous <Continuous>    PRN MEDICATIONS  ondansetron Injectable 4 milliGRAM(s) IV Push every 4 hours PRN  traMADol 50 milliGRAM(s) Oral every 6 hours PRN      ================= CONSULTS ==================  Ophthalmology (1/3)  No sign of papilledema to extent seen  - Pt on q1h neurochecks - no dilation performed   - vision 20/20 OD/OS, no diplopia, visual field within normal range and optic nerve grossly normal  - Pt and medical staff advised to re-consult if ocular status or vision changes or as needed when patient is off neuro checks.     Ob (1/3)  34yo now P2, admitted to MICU for subarachnoid hemorrhage, with h/o GHTN, likely preeclampsia with severe features, currently on keppra and magnesium for seizure prophylaxis and on multiple BP meds for BP control.   -recommend sending urine protein:creatinine ratio to r/o preeclampsia (significant proteinuria is considered when up:cr is greater than or equal to 0.3)  -recommend magnesium for seizure prophylaxis for 24hr postpartum (pt is past this window) as the highest risk of eclampsia is highest in first 24hr postpartum   -recommend to control BPs, as per current management per primary team   -Ob/Gyn team to follow  -reconsult PRN  - recommends renal sonogram to r/o autosomal dominant polycystic kidney disease given hypertension and possible cerebral aneurysm.    Neurology ()   Imp: 32 y/o F with  SAH r/o  cerebral AVM   Plan: Continue -120      Neuro checks q 1H     Keep HOB at 30    Continue Nimotop - will help with BP /Vasospasm   continue current care

## 2019-01-04 NOTE — PROGRESS NOTE ADULT - SUBJECTIVE AND OBJECTIVE BOX
HD#6    S/P SAH    Pt seen and examined at bedside. Pt without complaints at this time. Denies HA. Sts feeling better today.    Vital Signs Last 24 Hrs  T(C): 37.7 (2019 08:00), Max: 38.4 (2019 12:00)  T(F): 99.9 (2019 08:00), Max: 101.1 (2019 12:00)  HR: 86 (2019 09:00) (80 - 102)  BP: 127/61 (2019 09:) (92/44 - 145/69)  BP(mean): 77 (2019 09:) (57 - 90)  RR: 30 (:) (15 - 33)  SpO2: 98% (2019 09:) (93% - 98%)    I&O's Detail    2019 07:  -  2019 07:00  --------------------------------------------------------  IN:    IV PiggyBack: 100 mL    magnesium sulfate  Infusion: 75 mL    magnesium sulfate  Infusion: 50 mL    magnesium sulfate  Infusion: 175 mL    niCARdipine Infusion: 725 mL    Oral Fluid: 240 mL    sodium chloride 0.9%: 200 mL    sodium chloride 0.9%.: 950 mL  Total IN: 2515 mL    OUT:    Voided: 2600 mL  Total OUT: 2600 mL    Total NET: -85 mL      2019 07:  -  2019 09:36  --------------------------------------------------------  IN:    magnesium sulfate  Infusion: 50 mL    niCARdipine Infusion: 50 mL    sodium chloride 0.9%.: 100 mL  Total IN: 200 mL    OUT:  Total OUT: 0 mL    Total NET: 200 mL    I&O's Summary    2019 07:  -  2019 07:00  --------------------------------------------------------  IN: 2515 mL / OUT: 2600 mL / NET: -85 mL    2019 07:01  -  2019 09:36  --------------------------------------------------------  IN: 200 mL / OUT: 0 mL / NET: 200 mL    PHYSICAL EXAM:  Neurological:  Awake and Alert  Pupil reactive  Tracks   No droop  ZARCO  Follows commands    LABS:                        10.5   7.69  )-----------( 202      ( 2019 04:45 )             31.8     -04    138  |  102  |  18  ----------------------------<  86  3.9   |  20  |  0.6<L>    Ca    7.6<L>      2019 04:45  Phos  4.0     -  Mg     2.3     -    TPro  5.5<L>  /  Alb  3.3<L>  /  TBili  0.3  /  DBili  <0.2  /  AST  13  /  ALT  11  /  AlkPhos  76  -04    PT/INR - ( 2019 05:01 )   PT: 10.70 sec;   INR: 0.93 ratio      Urinalysis Basic - ( 2019 21:45 )    Color: Yellow / Appearance: Cloudy / S.020 / pH: x  Gluc: x / Ketone: Negative  / Bili: Negative / Urobili: 0.2 mg/dL   Blood: x / Protein: 30 mg/dL / Nitrite: Negative   Leuk Esterase: Small / RBC: >50 /HPF / WBC 10-25 /HPF   Sq Epi: x / Non Sq Epi: Few /HPF / Bacteria: Moderate /HPF    Allergies    Alcohol (Flushing)  Tamiflu (Unknown)    Intolerances      MEDICATIONS:  Antibiotics:    Neuro:  acetaminophen   Tablet .. 650 milliGRAM(s) Oral every 4 hours  levETIRAcetam  IVPB 750 milliGRAM(s) IV Intermittent every 12 hours  magnesium sulfate Infusion 1 Gm/Hr IV Continuous <Continuous>  ondansetron Injectable 4 milliGRAM(s) IV Push every 4 hours PRN  traMADol 50 milliGRAM(s) Oral every 6 hours PRN    IVF:  sodium chloride 0.9%. 1000 milliLiter(s) IV Continuous <Continuous>    ASSESSMENT:  33y Female s/p    SAH  ^SAH  Family history of hypertension (Mother)  Family history of high cholesterol (Father)  Handoff  Gestational hypertension  Asthma  PCOS (polycystic ovarian syndrome)    PLAN:  Continue ICU care  F/U with Dr. Monge

## 2019-01-04 NOTE — PROGRESS NOTE ADULT - ASSESSMENT
IMPRESSION:    SAH that occured 5 days ago s/p angio that did not show aneurysm  Venous sinus thrombosis ruled out      PLAN:    CNS: keppra,,nimodipine, interventional neuro f/u. F/U MRI results.    HEENT: Oral care    PULMONARY:  HOB @ 45 degrees, keep spo2 more than 92%    CARDIOVASCULAR: SBP less than 140mmhg. DC watson drip, switch to labetalol po.    GI: GI prophylaxis.  Feeding PO diet as tolerated    RENAL:  Follow up lytes.  Correct as needed. Mg drip as per neurology.    INFECTIOUS DISEASE: PAN cultures pending, start rocephin for positive UA.    HEMATOLOGICAL:  DVT prophylaxis: mechanical DVT prophylaxis.     ENDOCRINE:  Follow up FS.  Insulin protocol if needed.    MUSCULOSKELETAL: out of bed     Downgarde to stroke unit as per neurology

## 2019-01-04 NOTE — PROGRESS NOTE ADULT - ATTENDING COMMENTS
Pt doing well. BP controlled on PO meds. NO significant headache. Sig less facial edema. Cont care per Dr. Rider.

## 2019-01-04 NOTE — PROGRESS NOTE ADULT - ASSESSMENT
Imp: 34 y/o F with  SAH r/o  cerebral AVM   Plan: Continue -120      Neuro checks q 1H     Keep HOB at 30    Continue Nimotop - will help with BP /Vasospasm   continue current care

## 2019-01-04 NOTE — PROGRESS NOTE ADULT - ASSESSMENT
Impression:  33 year old woman  presented 2 days post partum with a history of gestational hypertension, PCOS and asthma transferred from [facility] for management of SAH. As per patient she developed severe headache on day of presentation. CTH at [facility] showed SAH and was transferred to [facility]. 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 Yesterday she was started on oral antihypertensives and the Cardene requirement was decreased. 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 OB/GYN believes the hypertension is not due to pre-eclampsia, or eclampsia. Yesterday she developed a fever but white count remains low. In general she is much improved today and continues to improve daily. Her exam is nonfocal, and reports minimal headache.    Suggestions:  Discontinue Cardene drip.  Labetolol 150 mg PO q6 hours.  Continue Procardia 10 mg q8 hours.  Continue Nimotipine 30 q3h.  Continue Tylenol q4 hours around the clock for one more day.  May change magnesium drip to magnesium oxide 400 mg q8 hours.  Continue NS IVF and well hydration.  Continue Keppra.  Seizure precautions.  Please make sure patient has Sequential DVT prophalaxis on.  Please Call Code Stroke if worsening of neurological symptoms.  May transfer to the stroke unit.    Robbie Burris NP  [phone] Impression:  33 year old woman  presented 2 days post partum with a history of gestational hypertension, PCOS and asthma transferred from [facility] for management of SAH. As per patient she developed severe headache on day of presentation. CTH at [facility] showed SAH and was transferred to [facility]. 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 Yesterday she was started on oral antihypertensives and the Cardene requirement was decreased. 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 OB/GYN believes the hypertension is not due to pre-eclampsia, or eclampsia. Yesterday she developed a fever but white count remains low. In general she is much improved today and continues to improve daily. Her exam is nonfocal, and reports minimal headache.    Suggestions:  Discontinue Cardene drip.  Labetolol 150 mg PO q6 hours.  Continue Procardia 10 mg q8 hours.  Continue Nimotipine 30 q3h.  Continue Tylenol q4 hours around the clock for one more day.  May change magnesium drip to magnesium oxide 400 mg q8 hours.  Continue NS IVF and well hydration.  Continue Keppra.  Seizure precautions.  Please make sure patient has Sequential DVT prophalaxis on.  Please Call Code Stroke if worsening of neurological symptoms.  May transfer to the stroke unit.

## 2019-01-04 NOTE — PROGRESS NOTE ADULT - SUBJECTIVE AND OBJECTIVE BOX
Patient is a 33y old  Female who presents with a chief complaint of SAH , transfer from [facility] (2019 09:36)        Over Night Events:    No events        ROS:  See HPI    PHYSICAL EXAM    ICU Vital Signs Last 24 Hrs  T(C): 37.7 (2019 08:00), Max: 38.4 (2019 12:00)  T(F): 99.9 (2019 08:00), Max: 101.1 (2019 12:00)  HR: 82 (2019 09:45) (80 - 102)  BP: 140/62 (2019 09:45) (92/44 - 145/69)  BP(mean): 81 (2019 09:45) (57 - 90)  ABP: --  ABP(mean): --  RR: 26 (2019 09:45) (15 - 33)  SpO2: 99% (2019 09:45) (93% - 99%)      General: NAD, improved  HEENT: GISSELLE             Lymphatic system: No cervical LN   Lungs: Bilateral BS  Cardiovascular: Regular   Gastrointestinal: Soft, Positive BS  Extremities: No clubbing.  Moves extremities.  Full Range of motion   Skin: Warm, intact  Neurological: No motor or sensory deficit       19 @ 07:  -  19 @ 07:00  --------------------------------------------------------  IN:    IV PiggyBack: 100 mL    magnesium sulfate  Infusion: 75 mL    magnesium sulfate  Infusion: 50 mL    magnesium sulfate  Infusion: 175 mL    niCARdipine Infusion: 725 mL    Oral Fluid: 240 mL    sodium chloride 0.9%: 200 mL    sodium chloride 0.9%.: 950 mL  Total IN: 2515 mL    OUT:    Voided: 2600 mL  Total OUT: 2600 mL    Total NET: -85 mL      19 @ 07:  -  19 @ 09:55  --------------------------------------------------------  IN:    magnesium sulfate  Infusion: 50 mL    niCARdipine Infusion: 50 mL    sodium chloride 0.9%.: 100 mL  Total IN: 200 mL    OUT:  Total OUT: 0 mL    Total NET: 200 mL          LABS:                            10.5   7.69  )-----------( 202      ( 2019 04:45 )             31.8                                               -    138  |  102  |  18  ----------------------------<  86  3.9   |  20  |  0.6<L>    Ca    7.6<L>      2019 04:45  Phos  4.0     -  Mg     2.3         TPro  5.5<L>  /  Alb  3.3<L>  /  TBili  0.3  /  DBili  <0.2  /  AST  13  /  ALT  11  /  AlkPhos  76  -04      PT/INR - ( 2019 05:01 )   PT: 10.70 sec;   INR: 0.93 ratio                                                Urinalysis Basic - ( 2019 21:45 )    Color: Yellow / Appearance: Cloudy / S.020 / pH: x  Gluc: x / Ketone: Negative  / Bili: Negative / Urobili: 0.2 mg/dL   Blood: x / Protein: 30 mg/dL / Nitrite: Negative   Leuk Esterase: Small / RBC: >50 /HPF / WBC 10-25 /HPF   Sq Epi: x / Non Sq Epi: Few /HPF / Bacteria: Moderate /HPF                                                  LIVER FUNCTIONS - ( 2019 04:45 )  Alb: 3.3 g/dL / Pro: 5.5 g/dL / ALK PHOS: 76 U/L / ALT: 11 U/L / AST: 13 U/L / GGT: x                                                                                                                                       MEDICATIONS  (STANDING):  acetaminophen   Tablet .. 650 milliGRAM(s) Oral every 4 hours  chlorhexidine 4% Liquid 1 Application(s) Topical <User Schedule>  docusate sodium 100 milliGRAM(s) Oral three times a day  labetalol 100 milliGRAM(s) Oral every 6 hours  levETIRAcetam  IVPB 750 milliGRAM(s) IV Intermittent every 12 hours  magnesium sulfate Infusion 1 Gm/Hr (25 mL/Hr) IV Continuous <Continuous>  niCARdipine Infusion 5 mG/Hr (25 mL/Hr) IV Continuous <Continuous>  NIFEdipine IR 10 milliGRAM(s) Oral every 8 hours  niMODipine 30 milliGRAM(s) Oral every 3 hours  pantoprazole    Tablet 40 milliGRAM(s) Oral before breakfast  senna 2 Tablet(s) Oral at bedtime  sodium chloride 0.9%. 1000 milliLiter(s) (50 mL/Hr) IV Continuous <Continuous>    MEDICATIONS  (PRN):  ondansetron Injectable 4 milliGRAM(s) IV Push every 4 hours PRN Nausea and/or Vomiting  traMADol 50 milliGRAM(s) Oral every 6 hours PRN Severe Pain (7 - 10)      Xrays: pending

## 2019-01-04 NOTE — PROGRESS NOTE ADULT - SUBJECTIVE AND OBJECTIVE BOX
Neurology Consult  note      Name  BALDEV KEE    HPI:  34 y/o female  who presents 2 days post partum with hx of gestational hypertension, PCOS and asthma transferred from [facility] for management of SAH. As per patient she developed severe headache since 3:30pm on day of presentation. Headache started while patient laying down and described as sudden in onset , diffuse , 8/10 in intensity and progressively worsening since afternoon a/w photophobia , nausea but no vomiting. Headache worsens with head movements. CTH at [facility] showed SAH for which the neurosurgeon at [facility] Dr. Calderon discussed case with dr fontanez and patient was transferred to [facility] for possible intervention like coiling in am .   No history of similar events in past    VS upon admission to MICU bp 120/66 , hr 86, tmax 98.7     Patient was started on Labetalol po last evening and Cardene drip is gradually being titrated down, as BP is maintained at desired target. HR is also better and is averaging 80-90 bpm. Mri brain was completed and is s/o a left cavernoma (official reports pending)    Plan  Follow up pending official read of MRI brain  Please obtain ob/gyn consult for manage of eclampsia  Continue Cardene goal -120.        Vital Signs Last 24 Hrs  T(C): 37.6 (2019 20:00), Max: 38.4 (2019 12:00)  T(F): 99.6 (2019 20:00), Max: 101.1 (2019 12:00)  HR: 84 (2019 00:30) (84 - 102)  BP: 121/62 (2019 00:30) (92/44 - 145/69)  BP(mean): 80 (2019 00:30) (57 - 95)  RR: 22 (2019 00:30) (17 - 33)  SpO2: 96% (2019 00:30) (93% - 98%)          Neurological Exam:   Mental status: Awake ALert Oriented x3   PEERLA EOMI  ZARCO X 4 Sensory: grossly intact                  Medications  acetaminophen   Tablet .. 650 milliGRAM(s) Oral every 4 hours  chlorhexidine 4% Liquid 1 Application(s) Topical <User Schedule>  docusate sodium 100 milliGRAM(s) Oral three times a day  labetalol 100 milliGRAM(s) Oral every 6 hours  levETIRAcetam  IVPB 750 milliGRAM(s) IV Intermittent every 12 hours  magnesium sulfate Infusion 1 Gm/Hr IV Continuous <Continuous>  niCARdipine Infusion 5 mG/Hr IV Continuous <Continuous>  NIFEdipine IR 10 milliGRAM(s) Oral every 8 hours  niMODipine 30 milliGRAM(s) Oral every 3 hours  ondansetron Injectable 4 milliGRAM(s) IV Push every 4 hours PRN  pantoprazole    Tablet 40 milliGRAM(s) Oral before breakfast  senna 2 Tablet(s) Oral at bedtime  sodium chloride 0.9%. 1000 milliLiter(s) IV Continuous <Continuous>  traMADol 50 milliGRAM(s) Oral every 6 hours PRN      Lab      Radiology

## 2019-01-05 LAB
ALBUMIN SERPL ELPH-MCNC: 3.1 G/DL — LOW (ref 3.5–5.2)
ALP SERPL-CCNC: 74 U/L — SIGNIFICANT CHANGE UP (ref 30–115)
ALT FLD-CCNC: 11 U/L — SIGNIFICANT CHANGE UP (ref 0–41)
ANION GAP SERPL CALC-SCNC: 15 MMOL/L — HIGH (ref 7–14)
AST SERPL-CCNC: 14 U/L — SIGNIFICANT CHANGE UP (ref 0–41)
BASOPHILS # BLD AUTO: 0.05 K/UL — SIGNIFICANT CHANGE UP (ref 0–0.2)
BASOPHILS NFR BLD AUTO: 0.7 % — SIGNIFICANT CHANGE UP (ref 0–1)
BILIRUB DIRECT SERPL-MCNC: <0.2 MG/DL — SIGNIFICANT CHANGE UP (ref 0–0.2)
BILIRUB INDIRECT FLD-MCNC: >0.1 MG/DL — LOW (ref 0.2–1.2)
BILIRUB SERPL-MCNC: 0.3 MG/DL — SIGNIFICANT CHANGE UP (ref 0.2–1.2)
BUN SERPL-MCNC: 18 MG/DL — SIGNIFICANT CHANGE UP (ref 10–20)
CALCIUM SERPL-MCNC: 8 MG/DL — LOW (ref 8.5–10.1)
CHLORIDE SERPL-SCNC: 107 MMOL/L — SIGNIFICANT CHANGE UP (ref 98–110)
CO2 SERPL-SCNC: 20 MMOL/L — SIGNIFICANT CHANGE UP (ref 17–32)
CREAT SERPL-MCNC: 0.6 MG/DL — LOW (ref 0.7–1.5)
EOSINOPHIL # BLD AUTO: 0.45 K/UL — SIGNIFICANT CHANGE UP (ref 0–0.7)
EOSINOPHIL NFR BLD AUTO: 6.6 % — SIGNIFICANT CHANGE UP (ref 0–8)
GLUCOSE SERPL-MCNC: 80 MG/DL — SIGNIFICANT CHANGE UP (ref 70–99)
HCT VFR BLD CALC: 31.9 % — LOW (ref 37–47)
HGB BLD-MCNC: 10.2 G/DL — LOW (ref 12–16)
IMM GRANULOCYTES NFR BLD AUTO: 0.7 % — HIGH (ref 0.1–0.3)
LYMPHOCYTES # BLD AUTO: 1.75 K/UL — SIGNIFICANT CHANGE UP (ref 1.2–3.4)
LYMPHOCYTES # BLD AUTO: 25.8 % — SIGNIFICANT CHANGE UP (ref 20.5–51.1)
MAGNESIUM SERPL-MCNC: 1.7 MG/DL — LOW (ref 1.8–2.4)
MCHC RBC-ENTMCNC: 28.2 PG — SIGNIFICANT CHANGE UP (ref 27–31)
MCHC RBC-ENTMCNC: 32 G/DL — SIGNIFICANT CHANGE UP (ref 32–37)
MCV RBC AUTO: 88.1 FL — SIGNIFICANT CHANGE UP (ref 81–99)
MONOCYTES # BLD AUTO: 0.69 K/UL — HIGH (ref 0.1–0.6)
MONOCYTES NFR BLD AUTO: 10.2 % — HIGH (ref 1.7–9.3)
NEUTROPHILS # BLD AUTO: 3.8 K/UL — SIGNIFICANT CHANGE UP (ref 1.4–6.5)
NEUTROPHILS NFR BLD AUTO: 56 % — SIGNIFICANT CHANGE UP (ref 42.2–75.2)
NRBC # BLD: 0 /100 WBCS — SIGNIFICANT CHANGE UP (ref 0–0)
PLATELET # BLD AUTO: 194 K/UL — SIGNIFICANT CHANGE UP (ref 130–400)
POTASSIUM SERPL-MCNC: 4.2 MMOL/L — SIGNIFICANT CHANGE UP (ref 3.5–5)
POTASSIUM SERPL-SCNC: 4.2 MMOL/L — SIGNIFICANT CHANGE UP (ref 3.5–5)
PROT SERPL-MCNC: 5.2 G/DL — LOW (ref 6–8)
RBC # BLD: 3.62 M/UL — LOW (ref 4.2–5.4)
RBC # FLD: 13.2 % — SIGNIFICANT CHANGE UP (ref 11.5–14.5)
SODIUM SERPL-SCNC: 142 MMOL/L — SIGNIFICANT CHANGE UP (ref 135–146)
WBC # BLD: 6.79 K/UL — SIGNIFICANT CHANGE UP (ref 4.8–10.8)
WBC # FLD AUTO: 6.79 K/UL — SIGNIFICANT CHANGE UP (ref 4.8–10.8)

## 2019-01-05 PROCEDURE — ZZZZZ: CPT

## 2019-01-05 RX ORDER — HYDRALAZINE HCL 50 MG
10 TABLET ORAL ONCE
Qty: 0 | Refills: 0 | Status: COMPLETED | OUTPATIENT
Start: 2019-01-05 | End: 2019-01-05

## 2019-01-05 RX ORDER — MAGNESIUM SULFATE 500 MG/ML
4 VIAL (ML) INJECTION ONCE
Qty: 0 | Refills: 0 | Status: DISCONTINUED | OUTPATIENT
Start: 2019-01-05 | End: 2019-01-05

## 2019-01-05 RX ORDER — MAGNESIUM SULFATE 500 MG/ML
2 VIAL (ML) INJECTION ONCE
Qty: 0 | Refills: 0 | Status: COMPLETED | OUTPATIENT
Start: 2019-01-05 | End: 2019-01-05

## 2019-01-05 RX ORDER — NIFEDIPINE 30 MG
20 TABLET, EXTENDED RELEASE 24 HR ORAL THREE TIMES A DAY
Qty: 0 | Refills: 0 | Status: DISCONTINUED | OUTPATIENT
Start: 2019-01-05 | End: 2019-01-06

## 2019-01-05 RX ORDER — NIFEDIPINE 30 MG
10 TABLET, EXTENDED RELEASE 24 HR ORAL ONCE
Qty: 0 | Refills: 0 | Status: COMPLETED | OUTPATIENT
Start: 2019-01-05 | End: 2019-01-05

## 2019-01-05 RX ADMIN — Medication 650 MILLIGRAM(S): at 15:12

## 2019-01-05 RX ADMIN — Medication 50 GRAM(S): at 19:17

## 2019-01-05 RX ADMIN — MAGNESIUM OXIDE 400 MG ORAL TABLET 400 MILLIGRAM(S): 241.3 TABLET ORAL at 17:11

## 2019-01-05 RX ADMIN — Medication 150 MILLIGRAM(S): at 11:34

## 2019-01-05 RX ADMIN — Medication 10 MILLIGRAM(S): at 17:52

## 2019-01-05 RX ADMIN — NIMODIPINE 30 MILLIGRAM(S): 60 SOLUTION ORAL at 18:05

## 2019-01-05 RX ADMIN — Medication 650 MILLIGRAM(S): at 22:25

## 2019-01-05 RX ADMIN — Medication 650 MILLIGRAM(S): at 17:51

## 2019-01-05 RX ADMIN — NIMODIPINE 30 MILLIGRAM(S): 60 SOLUTION ORAL at 15:17

## 2019-01-05 RX ADMIN — Medication 650 MILLIGRAM(S): at 10:00

## 2019-01-05 RX ADMIN — SENNA PLUS 2 TABLET(S): 8.6 TABLET ORAL at 21:05

## 2019-01-05 RX ADMIN — Medication 100 MILLIGRAM(S): at 05:22

## 2019-01-05 RX ADMIN — Medication 20 MILLIGRAM(S): at 21:04

## 2019-01-05 RX ADMIN — Medication 10 MILLIGRAM(S): at 19:17

## 2019-01-05 RX ADMIN — NIMODIPINE 30 MILLIGRAM(S): 60 SOLUTION ORAL at 11:34

## 2019-01-05 RX ADMIN — MAGNESIUM OXIDE 400 MG ORAL TABLET 400 MILLIGRAM(S): 241.3 TABLET ORAL at 11:34

## 2019-01-05 RX ADMIN — LEVETIRACETAM 400 MILLIGRAM(S): 250 TABLET, FILM COATED ORAL at 17:11

## 2019-01-05 RX ADMIN — CEFTRIAXONE 100 GRAM(S): 500 INJECTION, POWDER, FOR SOLUTION INTRAMUSCULAR; INTRAVENOUS at 09:04

## 2019-01-05 RX ADMIN — Medication 650 MILLIGRAM(S): at 06:00

## 2019-01-05 RX ADMIN — TRAMADOL HYDROCHLORIDE 50 MILLIGRAM(S): 50 TABLET ORAL at 07:24

## 2019-01-05 RX ADMIN — NIMODIPINE 30 MILLIGRAM(S): 60 SOLUTION ORAL at 08:44

## 2019-01-05 RX ADMIN — Medication 150 MILLIGRAM(S): at 05:24

## 2019-01-05 RX ADMIN — Medication 10 MILLIGRAM(S): at 13:15

## 2019-01-05 RX ADMIN — NIMODIPINE 30 MILLIGRAM(S): 60 SOLUTION ORAL at 05:22

## 2019-01-05 RX ADMIN — Medication 10 MILLIGRAM(S): at 05:23

## 2019-01-05 RX ADMIN — Medication 650 MILLIGRAM(S): at 01:09

## 2019-01-05 RX ADMIN — LEVETIRACETAM 400 MILLIGRAM(S): 250 TABLET, FILM COATED ORAL at 05:25

## 2019-01-05 RX ADMIN — MAGNESIUM OXIDE 400 MG ORAL TABLET 400 MILLIGRAM(S): 241.3 TABLET ORAL at 08:44

## 2019-01-05 RX ADMIN — Medication 100 MILLIGRAM(S): at 13:15

## 2019-01-05 RX ADMIN — Medication 650 MILLIGRAM(S): at 05:21

## 2019-01-05 RX ADMIN — Medication 100 MILLIGRAM(S): at 21:04

## 2019-01-05 RX ADMIN — PANTOPRAZOLE SODIUM 40 MILLIGRAM(S): 20 TABLET, DELAYED RELEASE ORAL at 08:44

## 2019-01-05 RX ADMIN — Medication 150 MILLIGRAM(S): at 01:15

## 2019-01-05 RX ADMIN — Medication 650 MILLIGRAM(S): at 13:15

## 2019-01-05 RX ADMIN — Medication 650 MILLIGRAM(S): at 21:04

## 2019-01-05 RX ADMIN — Medication 150 MILLIGRAM(S): at 17:11

## 2019-01-05 RX ADMIN — Medication 650 MILLIGRAM(S): at 09:03

## 2019-01-05 RX ADMIN — Medication 650 MILLIGRAM(S): at 17:10

## 2019-01-05 RX ADMIN — Medication 10 MILLIGRAM(S): at 13:40

## 2019-01-05 RX ADMIN — NIMODIPINE 30 MILLIGRAM(S): 60 SOLUTION ORAL at 01:00

## 2019-01-05 RX ADMIN — Medication 650 MILLIGRAM(S): at 05:19

## 2019-01-05 RX ADMIN — CHLORHEXIDINE GLUCONATE 1 APPLICATION(S): 213 SOLUTION TOPICAL at 05:25

## 2019-01-05 RX ADMIN — NIMODIPINE 30 MILLIGRAM(S): 60 SOLUTION ORAL at 21:04

## 2019-01-05 RX ADMIN — NIMODIPINE 30 MILLIGRAM(S): 60 SOLUTION ORAL at 03:10

## 2019-01-05 NOTE — PROGRESS NOTE ADULT - SUBJECTIVE AND OBJECTIVE BOX
Over Night Events:  no new events doing well moving all extremitis seen by neuro surgery      ROS:  See HPI    PHYSICAL EXAM    ICU Vital Signs Last 24 Hrs  T(C): 36.8 (05 Jan 2019 08:00), Max: 37.7 (04 Jan 2019 12:00)  T(F): 98.2 (05 Jan 2019 08:00), Max: 99.9 (04 Jan 2019 12:00)  HR: 60 (05 Jan 2019 11:00) (60 - 92)  BP: 153/98 (05 Jan 2019 11:00) (113/66 - 153/98)  BP(mean): 120 (05 Jan 2019 11:00) (77 - 120)  ABP: --  ABP(mean): --  RR: 21 (05 Jan 2019 11:00) (14 - 30)  SpO2: 99% (05 Jan 2019 11:00) (96% - 100%)    Alcohol (Flushing)  Tamiflu (Unknown)    General:AO x3  HEENT:           Nl     Lymph Nodes: NO cervical LN   Lungs: Bilateral BS  Cardiovascular: Regular   Abdomen: Soft, Positive BS  Extremities: No clubbing   Skin: Nl  Neurological: Nl    01-04-19 @ 07:01  -  01-05-19 @ 07:00  --------------------------------------------------------  IN: 1975 mL / OUT: 1550 mL / NET: 425 mL    01-05-19 @ 07:01 - 01-05-19 @ 11:46  --------------------------------------------------------  IN: 150 mL / OUT: 400 mL / NET: -250 mL        LABS:                          10.2   6.79  )-----------( 194      ( 05 Jan 2019 05:26 )             31.9                                               01-05    142  |  107  |  18  ----------------------------<  80  4.2   |  20  |  0.6<L>    Ca    8.0<L>      05 Jan 2019 05:26  Mg     1.7     01-05    TPro  5.2<L>  /  Alb  3.1<L>  /  TBili  0.3  /  DBili  <0.2  /  AST  14  /  ALT  11  /  AlkPhos  74  01-05                                                                                           LIVER FUNCTIONS - ( 05 Jan 2019 05:26 )  Alb: 3.1 g/dL / Pro: 5.2 g/dL / ALK PHOS: 74 U/L / ALT: 11 U/L / AST: 14 U/L / GGT: x                                                  Culture - Blood (collected 03 Jan 2019 11:16)  Source: .Blood None  Preliminary Report (04 Jan 2019 23:01):    No growth to date.    Culture - Urine (collected 03 Jan 2019 09:45)  Source: .Urine Clean Catch (Midstream)  Final Report (04 Jan 2019 23:26):    Culture grew 3 or more types of organisms which indicate    collection contamination; consider recollection only if clinically    indicated.                                                                                           MEDICATIONS  (STANDING):  acetaminophen   Tablet .. 650 milliGRAM(s) Oral every 4 hours  cefTRIAXone   IVPB 1 Gram(s) IV Intermittent every 24 hours  cefTRIAXone   IVPB      chlorhexidine 4% Liquid 1 Application(s) Topical <User Schedule>  docusate sodium 100 milliGRAM(s) Oral three times a day  labetalol 150 milliGRAM(s) Oral every 6 hours  levETIRAcetam  IVPB 750 milliGRAM(s) IV Intermittent every 12 hours  magnesium oxide 400 milliGRAM(s) Oral three times a day with meals  NIFEdipine IR 10 milliGRAM(s) Oral every 8 hours  niMODipine 30 milliGRAM(s) Oral every 3 hours  pantoprazole    Tablet 40 milliGRAM(s) Oral before breakfast  senna 2 Tablet(s) Oral at bedtime  sodium chloride 0.9%. 1000 milliLiter(s) (50 mL/Hr) IV Continuous <Continuous>    MEDICATIONS  (PRN):  ondansetron Injectable 4 milliGRAM(s) IV Push every 4 hours PRN Nausea and/or Vomiting  traMADol 50 milliGRAM(s) Oral every 6 hours PRN Severe Pain (7 - 10)

## 2019-01-05 NOTE — PROGRESS NOTE ADULT - SUBJECTIVE AND OBJECTIVE BOX
SUBJECTIVE:    Patient is a 33y old Female who presents with a chief complaint of SAH , transfer from [facility] (05 Jan 2019 11:39)    Stable, no acute events overnight.    PAST MEDICAL & SURGICAL HISTORY  Gestational hypertension  Asthma  PCOS (polycystic ovarian syndrome)       ALLERGIES:  Alcohol (Flushing)  Tamiflu (Unknown)    MEDICATIONS:  STANDING MEDICATIONS  acetaminophen   Tablet .. 650 milliGRAM(s) Oral every 4 hours  cefTRIAXone   IVPB 1 Gram(s) IV Intermittent every 24 hours  cefTRIAXone   IVPB      chlorhexidine 4% Liquid 1 Application(s) Topical <User Schedule>  docusate sodium 100 milliGRAM(s) Oral three times a day  labetalol 150 milliGRAM(s) Oral every 6 hours  levETIRAcetam  IVPB 750 milliGRAM(s) IV Intermittent every 12 hours  magnesium oxide 400 milliGRAM(s) Oral three times a day with meals  NIFEdipine IR 10 milliGRAM(s) Oral every 8 hours  niMODipine 30 milliGRAM(s) Oral every 3 hours  pantoprazole    Tablet 40 milliGRAM(s) Oral before breakfast  senna 2 Tablet(s) Oral at bedtime  sodium chloride 0.9%. 1000 milliLiter(s) IV Continuous <Continuous>    PRN MEDICATIONS  ondansetron Injectable 4 milliGRAM(s) IV Push every 4 hours PRN  traMADol 50 milliGRAM(s) Oral every 6 hours PRN    VITALS:   T(F): 98.2  HR: 68  BP: 133/85  RR: 16  SpO2: 98%    LABS:                        10.2   6.79  )-----------( 194      ( 05 Jan 2019 05:26 )             31.9     01-05    142  |  107  |  18  ----------------------------<  80  4.2   |  20  |  0.6<L>    Ca    8.0<L>      05 Jan 2019 05:26  Mg     1.7     01-05    TPro  5.2<L>  /  Alb  3.1<L>  /  TBili  0.3  /  DBili  <0.2  /  AST  14  /  ALT  11  /  AlkPhos  74  01-05              Culture - Blood (collected 03 Jan 2019 11:16)  Source: .Blood None  Preliminary Report (04 Jan 2019 23:01):    No growth to date.    Culture - Urine (collected 03 Jan 2019 09:45)  Source: .Urine Clean Catch (Midstream)  Final Report (04 Jan 2019 23:26):    Culture grew 3 or more types of organisms which indicate    collection contamination; consider recollection only if clinically    indicated.              01-04-19 @ 07:01  -  01-05-19 @ 07:00  --------------------------------------------------------  IN: 1975 mL / OUT: 1550 mL / NET: 425 mL    01-05-19 @ 07:01  -  01-05-19 @ 15:37  --------------------------------------------------------  IN: 400 mL / OUT: 650 mL / NET: -250 mL          PHYSICAL EXAM:  GEN: NAD, comfortable  LUNGS: CTAB, no w/r/r  HEART: RRR, no m/r/g  ABD: soft, NT/ND, +BS  EXT: no edema, PP b/l  NEURO: AAOx3, non-focal neurological exam

## 2019-01-05 NOTE — PROGRESS NOTE ADULT - SUBJECTIVE AND OBJECTIVE BOX
Neurology Progress Note    Interval History:    Patient examined at bedside and appears in no distress. No acute overnight events.   Vital Signs Last 24 Hrs  T(C): 36.3 (2019 04:00), Max: 37.7 (2019 08:00)  T(F): 97.4 (2019 04:00), Max: 99.9 (2019 08:00)  HR: 80 (2019 07:00) (60 - 92)  BP: 119/68 (2019 07:00) (113/62 - 146/88)  BP(mean): 89 (2019 07:00) (76 - 109)  RR: 18 (2019 07:00) (14 - 32)  SpO2: 97% (2019 07:00) (96% - 99%)  NIH score  - LOC: 0  - Visual: 0  - Best Gaze: 0  - Facial: 0  - Motor Right arm: 0  - Motor Left Arm: 0  - Motor Right le  - Motor left le  - Limb Ataxia: 0  - Sensory 0  - Best Language: 0  - Dysarthria: 0  - Extinction/inattention: 0  TOTAL: 0  Labs:  CBC Full  -  ( 2019 05:26 )  WBC Count : 6.79 K/uL  Hemoglobin : 10.2 g/dL  Hematocrit : 31.9 %  Platelet Count - Automated : 194 K/uL  Mean Cell Volume : 88.1 fL  Mean Cell Hemoglobin : 28.2 pg  Mean Cell Hemoglobin Concentration : 32.0 g/dL  Auto Neutrophil # : 3.80 K/uL  Auto Lymphocyte # : 1.75 K/uL  Auto Monocyte # : 0.69 K/uL  Auto Eosinophil # : 0.45 K/uL  Auto Basophil # : 0.05 K/uL  Auto Neutrophil % : 56.0 %  Auto Lymphocyte % : 25.8 %  Auto Monocyte % : 10.2 %  Auto Eosinophil % : 6.6 %  Auto Basophil % : 0.7 %    -05    142  |  107  |  18  ----------------------------<  80  4.2   |  20  |  0.6<L>    Ca    8.0<L>      2019 05:26  Mg     1.7     -05    TPro  5.2<L>  /  Alb  3.1<L>  /  TBili  0.3  /  DBili  <0.2  /  AST  14  /  ALT  11  /  AlkPhos  74  01-05    LIVER FUNCTIONS - ( 2019 05:26 )  Alb: 3.1 g/dL / Pro: 5.2 g/dL / ALK PHOS: 74 U/L / ALT: 11 U/L / AST: 14 U/L / GGT: x Neurology Progress Note    Interval History:    Patient examined at bedside and appears in no distress. No acute overnight events.   Vital Signs Last 24 Hrs  T(C): 36.3 (2019 04:00), Max: 37.7 (2019 08:00)  T(F): 97.4 (2019 04:00), Max: 99.9 (2019 08:00)  HR: 80 (2019 07:00) (60 - 92)  BP: 119/68 (2019 07:00) (113/62 - 146/88)  BP(mean): 89 (2019 07:00) (76 - 109)  RR: 18 (2019 07:00) (14 - 32)  SpO2: 97% (2019 07:00) (96% - 99%)  NIH score  - LOC: 0  - Visual: 0  - Best Gaze: 0  - Facial: 0  - Motor Right arm: 0  - Motor Left Arm: 0  - Motor Right le  - Motor left le  - Limb Ataxia: 0  - Sensory 0  - Best Language: 0  - Dysarthria: 0  - Extinction/inattention: 0  TOTAL: 0  MEDICATIONS  (STANDING):  acetaminophen   Tablet .. 650 milliGRAM(s) Oral every 4 hours  cefTRIAXone   IVPB 1 Gram(s) IV Intermittent every 24 hours  cefTRIAXone   IVPB      chlorhexidine 4% Liquid 1 Application(s) Topical <User Schedule>  docusate sodium 100 milliGRAM(s) Oral three times a day  labetalol 150 milliGRAM(s) Oral every 6 hours  levETIRAcetam  IVPB 750 milliGRAM(s) IV Intermittent every 12 hours  magnesium oxide 400 milliGRAM(s) Oral three times a day with meals  NIFEdipine IR 10 milliGRAM(s) Oral every 8 hours  niMODipine 30 milliGRAM(s) Oral every 3 hours  pantoprazole    Tablet 40 milliGRAM(s) Oral before breakfast  senna 2 Tablet(s) Oral at bedtime  sodium chloride 0.9%. 1000 milliLiter(s) (50 mL/Hr) IV Continuous <Continuous>  Labs:  CBC Full  -  ( 2019 05:26 )  WBC Count : 6.79 K/uL  Hemoglobin : 10.2 g/dL  Hematocrit : 31.9 %  Platelet Count - Automated : 194 K/uL  Mean Cell Volume : 88.1 fL  Mean Cell Hemoglobin : 28.2 pg  Mean Cell Hemoglobin Concentration : 32.0 g/dL  Auto Neutrophil # : 3.80 K/uL  Auto Lymphocyte # : 1.75 K/uL  Auto Monocyte # : 0.69 K/uL  Auto Eosinophil # : 0.45 K/uL  Auto Basophil # : 0.05 K/uL  Auto Neutrophil % : 56.0 %  Auto Lymphocyte % : 25.8 %  Auto Monocyte % : 10.2 %  Auto Eosinophil % : 6.6 %  Auto Basophil % : 0.7 %        142  |  107  |  18  ----------------------------<  80  4.2   |  20  |  0.6<L>    Ca    8.0<L>      2019 05:26  Mg     1.7         TPro  5.2<L>  /  Alb  3.1<L>  /  TBili  0.3  /  DBili  <0.2  /  AST  14  /  ALT  11  /  AlkPhos  74  -    LIVER FUNCTIONS - ( 2019 05:26 )  Alb: 3.1 g/dL / Pro: 5.2 g/dL / ALK PHOS: 74 U/L / ALT: 11 U/L / AST: 14 U/L / GGT: x

## 2019-01-05 NOTE — PROVIDER CONTACT NOTE (MEDICATION) - SITUATION
MD made aware pt. c/o headache, received tylenol approx. an hour and a half ago and can not get another tylenol. Pt. states pain is 7/10.
Pt has been having an increase in systolic BP, order stated to maintain <140. Pt is also c/o severe headache, "It's almost like the one I came into the hospital with"

## 2019-01-05 NOTE — PROVIDER CONTACT NOTE (MEDICATION) - ACTION/TREATMENT ORDERED:
A full neurological exam was complete, no deficits noted. Hydralazine 10 mg stat ordered & given. No further interventions at this time, all cares rendered, will continue to monitor patient.

## 2019-01-05 NOTE — PROGRESS NOTE ADULT - ATTENDING COMMENTS
Patient examined this evening and has NIH stroke score of 0.  She reports 1-2/10 right occipital headache.  This morning prior to cardene drip being started headache was 7-8/10.  She reports feeling worse for 30 min after drip was started, taking and nap and waking up with significant reduction in headache intensity to 1-2/10.  BP's in 130's - 140's over course of day and nurse reports having to frequently adjust the cardene dose upward.  At the time of my exam her SBP = 110.    I spoke to Dr. Rider regarding patient's condition and he wants to rule out increase in SAH with noncontrast head CT and wants to assess for vasospasm with CT perfusion and CT angiogram.    Patient has eaten within 8 hours of study but risk of delay is felt to be greater than risk for aspiration so study will be performed now. Patient examined this evening and has NIH stroke score of 0.  Terrell Balbuena = 1.  She reports 1-2/10 right occipital headache.  This morning prior to cardene drip being started headache was 7-8/10.  She reports feeling worse for 30 min after drip was started, taking and nap and waking up with significant reduction in headache intensity to 1-2/10.  BP's in 130's - 140's over course of day and nurse reports having to frequently adjust the cardene dose upward.  At the time of my exam her SBP = 110.    I spoke to Dr. Rider regarding patient's condition and he wants to rule out increase in SAH with noncontrast head CT and wants to assess for vasospasm with CT perfusion and CT angiogram.    Patient has eaten within 8 hours of study but risk of delay is felt to be greater than risk for aspiration so study will be performed now.

## 2019-01-05 NOTE — CHART NOTE - NSCHARTNOTEFT_GEN_A_CORE
Rounded on pt this evening. SBP above target, ICU administered 2 doses of Hydralazine. Pt c/o headache. No defecits on neuro exam. Discussed w house staff avoid hydralazine due to rebound htn, headaches. increase nidedipine IR to 20mg TID. pt was seen and assessed w dr samano Rounded on pt this evening. SBP above target, ICU administered 2 doses of Hydralazine. Pt c/o headache. No deficits on neuro exam. Discussed w house staff avoid hydralazine due to rebound htn, headaches. increase nifedipine IR to 20mg TID. pt was seen and assessed w dr samano

## 2019-01-05 NOTE — PROGRESS NOTE ADULT - ATTENDING COMMENTS
Patient seen in f/u tonight and she is c/o increased headaches.  BP is elevated into 160's.  Discussed with ICU team and antihypertensives being adjusted.  magnesium level is low and patient will be given additional mangnesium.  She is on labetolol, nimodipine, and nifedipine.  Her neurologic exam is stable.

## 2019-01-05 NOTE — PROGRESS NOTE ADULT - ASSESSMENT
33 year old woman  presented 2 days post partum with a history of gestational hypertension, PCOS and asthma transferred from [facility] for management of SAH. As per patient she developed severe headache on day of presentation. CTH at [facility] showed SAH and was transferred to [facility]. 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 OB/GYN believes the hypertension is not due to pre-eclampsia, or eclampsia. In general she is much improved today and continues to improve daily. Her exam is nonfocal.    Plan:   - Downgrade to stroke unit   - Continue current medications as of now  - Continue NS and IVF   - Seizure precautions   - Please make sure patient has Sequential DVT prophylaxis on.    Neuro Attending note will follow

## 2019-01-05 NOTE — PROGRESS NOTE ADULT - SUBJECTIVE AND OBJECTIVE BOX
Neurology Follow up note      Name  BALDEV KEE    HPI:  34 y/o female  who presents 2 days post partum with hx of gestational hypertension, PCOS and asthma transferred from [facility] for management of SAH. As per patient she developed severe headache since 3:30pm on day of presentation. Headache started while patient laying down and described as sudden in onset , diffuse , 8/10 in intensity and progressively worsening since afternoon a/w photophobia , nausea but no vomiting. Headache worsens with head movements. CTH at [facility] showed SAH for which the neurosurgeon at [facility] Dr. Calderon discussed case with dr fontanez and patient was transferred to [facility] for possible intervention like coiling in am .   No history of similar events in past    VS upon admission to MICU bp 120/66 , hr 86, tmax 98.7 (31 Dec 2018 02:15)      Interval History - Pt rates headache 4-5; SBP has been difficult to control;  so PO nifedine 10mg->20mg BP meds have been increased. Upon my arrival BP  125/76. pt is awake with  at bedside.          Vital Signs Last 24 Hrs  T(C): 37.7 (2019 20:00), Max: 37.7 (2019 20:00)  T(F): 99.9 (2019 20:00), Max: 99.9 (2019 20:00)  HR: 80 (2019 23:00) (60 - 94)  BP: 132/78 (2019 22:30) (104/60 - 173/97)  BP(mean): 97 (2019 22:30) (77 - 135)  RR: 26 (2019 23:00) (14 - 29)  SpO2: 98% (2019 23:00) (96% - 100%)          Neurological Exam:   Mental status: Awake, alert and oriented x3  Speech intact Follows Commands No facial noted  ZARCO X 4 No drift  Sensory; Grossly intact to light touch          Medications  acetaminophen   Tablet .. 650 milliGRAM(s) Oral every 4 hours  cefTRIAXone   IVPB 1 Gram(s) IV Intermittent every 24 hours  cefTRIAXone   IVPB      chlorhexidine 4% Liquid 1 Application(s) Topical <User Schedule>  docusate sodium 100 milliGRAM(s) Oral three times a day  labetalol 150 milliGRAM(s) Oral every 6 hours  levETIRAcetam  IVPB 750 milliGRAM(s) IV Intermittent every 12 hours  magnesium oxide 400 milliGRAM(s) Oral three times a day with meals  NIFEdipine IR 20 milliGRAM(s) Oral three times a day  niMODipine 30 milliGRAM(s) Oral every 3 hours  ondansetron Injectable 4 milliGRAM(s) IV Push every 4 hours PRN  pantoprazole    Tablet 40 milliGRAM(s) Oral before breakfast  senna 2 Tablet(s) Oral at bedtime  sodium chloride 0.9%. 1000 milliLiter(s) IV Continuous <Continuous>  traMADol 50 milliGRAM(s) Oral every 6 hours PRN      Lab      Radiology Neurology Follow up note      Name  BALDEV KEE    HPI:  34 y/o female  who presents 2 days post partum with hx of gestational hypertension, PCOS and asthma transferred from [facility] for management of SAH. As per patient she developed severe headache since 3:30pm on day of presentation. Headache started while patient laying down and described as sudden in onset , diffuse , 8/10 in intensity and progressively worsening since afternoon a/w photophobia , nausea but no vomiting. Headache worsens with head movements. CTH at [facility] showed SAH for which the neurosurgeon at [facility] Dr. Calderon discussed case with dr fontanez and patient was transferred to [facility] for possible intervention like coiling in am .   No history of similar events in past    VS upon admission to MICU bp 120/66 , hr 86, tmax 98.7 (31 Dec 2018 02:15)      Interval History - Pt rates headache 4-5; SBP has been difficult to control;  so PO nifedine 10mg->20mg BP meds have been increased. Upon my arrival BP  125/76. pt is awake with  at bedside.          Vital Signs Last 24 Hrs  T(C): 37.7 (2019 20:00), Max: 37.7 (2019 20:00)  T(F): 99.9 (2019 20:00), Max: 99.9 (2019 20:00)  HR: 80 (2019 23:00) (60 - 94)  BP: 132/78 (2019 22:30) (104/60 - 173/97)  BP(mean): 97 (2019 22:30) (77 - 135)  RR: 26 (2019 23:00) (14 - 29)  SpO2: 98% (2019 23:00) (96% - 100%)          Neurological Exam:   Mental status: Awake, alert and oriented x3  Speech intact Follows Commands No facial noted  ZARCO X 4 No drift  Sensory; Grossly intact to light touch          Medications  acetaminophen   Tablet .. 650 milliGRAM(s) Oral every 4 hours  cefTRIAXone   IVPB 1 Gram(s) IV Intermittent every 24 hours  cefTRIAXone   IVPB      chlorhexidine 4% Liquid 1 Application(s) Topical <User Schedule>  docusate sodium 100 milliGRAM(s) Oral three times a day  labetalol 150 milliGRAM(s) Oral every 6 hours  levETIRAcetam  IVPB 750 milliGRAM(s) IV Intermittent every 12 hours  magnesium oxide 400 milliGRAM(s) Oral three times a day with meals  NIFEdipine IR 20 milliGRAM(s) Oral three times a day  niMODipine 30 milliGRAM(s) Oral every 3 hours  ondansetron Injectable 4 milliGRAM(s) IV Push every 4 hours PRN  pantoprazole    Tablet 40 milliGRAM(s) Oral before breakfast  senna 2 Tablet(s) Oral at bedtime  sodium chloride 0.9%. 1000 milliLiter(s) IV Continuous <Continuous>  traMADol 50 milliGRAM(s) Oral every 6 hours PRN

## 2019-01-05 NOTE — PROGRESS NOTE ADULT - ASSESSMENT
IMP: 32 y/o F with SAH  ? left AVM    Plan: Strict BP control - Please  keep SBP below 140 if BP rises overnight - please start low dose cardene gttand gently wean off  frequent neuro checks  Tylenol PRN for HA  Call Neuro for any change in exam    Discussed with MICU resident/Bedside Nurse IMP: 32 y/o F with SAH.    Plan: Strict BP control - Please  keep SBP below 140 if BP rises overnight - please start low dose cardene gttand gently wean off  frequent neuro checks  Tylenol PRN for HA  Call Neuro for any change in exam    Discussed with MICU resident/Bedside Nurse IMP: 34 y/o F with SAH.  Superficial left temporal cavernoma identified on MRI.    Plan: Strict BP control - Please  keep SBP below 140 if BP rises overnight - please start low dose cardene gttand gently wean off  frequent neuro checks  Tylenol PRN for HA  Call Neuro for any change in exam    Discussed with MICU resident/Bedside Nurse

## 2019-01-05 NOTE — PROGRESS NOTE ADULT - SUBJECTIVE AND OBJECTIVE BOX
Subjective: sleeping comfortable; no significant headaches at this time    T(C): 36.8 (01-05-19 @ 08:00), Max: 37.7 (01-04-19 @ 12:00)  HR: 78 (01-05-19 @ 08:00) (60 - 92)  BP: 125/83 (01-05-19 @ 08:00) (113/66 - 146/88)  RR: 17 (01-05-19 @ 08:00) (14 - 32)  SpO2: 99% (01-05-19 @ 08:00) (96% - 99%)  Wt(kg): --    Exam:    CBC Full  -  ( 05 Jan 2019 05:26 )  WBC Count : 6.79 K/uL  Hemoglobin : 10.2 g/dL  Hematocrit : 31.9 %  Platelet Count - Automated : 194 K/uL  Mean Cell Volume : 88.1 fL  Mean Cell Hemoglobin : 28.2 pg  Mean Cell Hemoglobin Concentration : 32.0 g/dL      01-05    142  |  107  |  18  ----------------------------<  80  4.2   |  20  |  0.6<L>    Ca    8.0<L>      05 Jan 2019 05:26  Mg     1.7     01-05    TPro  5.2<L>  /  Alb  3.1<L>  /  TBili  0.3  /  DBili  <0.2  /  AST  14  /  ALT  11  /  AlkPhos  74  01-05        MEDICATIONS:  Antibiotics:  cefTRIAXone   IVPB 1 Gram(s) IV Intermittent every 24 hours  cefTRIAXone   IVPB        Neuro:  acetaminophen   Tablet .. 650 milliGRAM(s) Oral every 4 hours  levETIRAcetam  IVPB 750 milliGRAM(s) IV Intermittent every 12 hours  ondansetron Injectable 4 milliGRAM(s) IV Push every 4 hours PRN  traMADol 50 milliGRAM(s) Oral every 6 hours PRN    Anticoagulation:    OTHER:  chlorhexidine 4% Liquid 1 Application(s) Topical <User Schedule>  docusate sodium 100 milliGRAM(s) Oral three times a day  labetalol 150 milliGRAM(s) Oral every 6 hours  NIFEdipine IR 10 milliGRAM(s) Oral every 8 hours  niMODipine 30 milliGRAM(s) Oral every 3 hours  pantoprazole    Tablet 40 milliGRAM(s) Oral before breakfast  senna 2 Tablet(s) Oral at bedtime    IVF:  magnesium oxide 400 milliGRAM(s) Oral three times a day with meals  sodium chloride 0.9%. 1000 milliLiter(s) IV Continuous <Continuous>    Assessment/Plan:    Continue ICU care  Will need follow-up angiogram to rule out occult aneurysm/AVM.  At this time, per MRI, there is suspicion of cavernoma.  There is no indication for neurosurgical intervention at this time.  MAy downgrade to stroke unit per neurology.

## 2019-01-05 NOTE — PROGRESS NOTE ADULT - ASSESSMENT
34 yo F w/ PMH of gestational HTN, asthma and PCOS transferred from [facility] for management of SAH with CTH showing small SAH in left frontotemporal region.    # Fever r/o infection   - f/u panculture  - UA positive, f/u urine culture  - continue Rocephin 1g q24h (1/4) for 5d   - Tylenol around the clock     #SAH s/p diagnostic cerebral angiogram   - CTA head and neck (12/30): negative for any large vessel occlusion or significant stenosis  - MRV (1/1): concerning for venous sinus thrombosis  - MR Head (1/2): left temproal superficial cavernoma  - as per neurology, area of bleed does not correspond with the location of the cavernoma and need to rule out other cause  - change to labetolol 150 mg PO q6h  - c/w procardia 10 mg PO q8h    - d/c nicardipine drip  - target SBP goal to 100 - 130  - c/w nimodipine 30 mg q3 and NS @ 50 to prevent vasospasm  - c/w keppra 750 mg q12  - change to Mg 400 mg PO q8h  - c/w neuro checks q4h   - f/u BMP and Mg    # Hypertension, uncontrolled, r/o secondary cause  - less likely to be eclampsia   - r/o polycystic kidney disease, order US kidney  - c/w labetalol procardia    # Induced hypermagnesemia   - change to Mg 400 mg PO q8h   - f/u BMP  - maintains Mg between 2 - 4    # Intractable headache / nausea secondary to SAH  - zofran PRN for nausea   - tylenol standing for headache  - tramadol PRN for severe pain    Diet: DASH/TLC   GI ppx: ( protonix )  DVT ppx: ( SCD )  Activity: ambulate as tolerated  Code status: Full Code ( X ) / DNR (  ) / DNI (  )  Disposition: from home,    can be transfered to stroke unite

## 2019-01-05 NOTE — PROGRESS NOTE ADULT - ASSESSMENT
34 yo F w/ PMH of gestational HTN, asthma and PCOS transferred from [facility] for management of SAH with CTH showing small SAH in left frontotemporal region.    # Fever r/o infection   - urine culture contaminated, blood culture no growth  - Rocephin 1g q24h (started 1/4) for 5d   - Tylenol around the clock     #SAH s/p diagnostic cerebral angiogram   - CTA head and neck (12/30): negative for any large vessel occlusion or significant stenosis  - MRV (1/1): concerning for venous sinus thrombosis  - MR Head (1/2): left temproal superficial cavernoma  - as per neurology, area of bleed does not correspond with the location of the cavernoma and need to rule out other cause  - labetolol 150 mg PO q6h  - c/w procardia 10 mg PO q8h    - target SBP goal to 100 - 130  - c/w nimodipine 30 mg q3 and NS @ 50 to prevent vasospasm  - c/w keppra 750 mg q12  - Mg 400 mg PO q8h  - c/w neuro checks q4h   - f/u BMP and Mg    # Hypertension, uncontrolled, r/o secondary cause  - less likely to be eclampsia   - r/o polycystic kidney disease, order US kidney  - c/w labetalol procardia    # Induced hypermagnesemia   - change to Mg 400 mg PO q8h   - f/u BMP  - maintains Mg between 2 - 4    # Intractable headache / nausea secondary to SAH  - zofran PRN for nausea   - tylenol standing for headache  - tramadol PRN for severe pain    Diet: DASH/TLC   GI ppx: ( protonix )  DVT ppx: ( SCD )  Activity: ambulate as tolerated  Code status: Full Code ( X ) / DNR (  ) / DNI (  )  Disposition: from home    Awaiting downgrade from ICU to stroke unit

## 2019-01-06 LAB
ANION GAP SERPL CALC-SCNC: 18 MMOL/L — HIGH (ref 7–14)
BASOPHILS # BLD AUTO: 0.06 K/UL — SIGNIFICANT CHANGE UP (ref 0–0.2)
BASOPHILS NFR BLD AUTO: 0.7 % — SIGNIFICANT CHANGE UP (ref 0–1)
BUN SERPL-MCNC: 13 MG/DL — SIGNIFICANT CHANGE UP (ref 10–20)
CALCIUM SERPL-MCNC: 8.4 MG/DL — LOW (ref 8.5–10.1)
CHLORIDE SERPL-SCNC: 103 MMOL/L — SIGNIFICANT CHANGE UP (ref 98–110)
CO2 SERPL-SCNC: 19 MMOL/L — SIGNIFICANT CHANGE UP (ref 17–32)
CREAT SERPL-MCNC: 0.6 MG/DL — LOW (ref 0.7–1.5)
EOSINOPHIL # BLD AUTO: 0.44 K/UL — SIGNIFICANT CHANGE UP (ref 0–0.7)
EOSINOPHIL NFR BLD AUTO: 5.3 % — SIGNIFICANT CHANGE UP (ref 0–8)
GLUCOSE SERPL-MCNC: 85 MG/DL — SIGNIFICANT CHANGE UP (ref 70–99)
HCT VFR BLD CALC: 35.9 % — LOW (ref 37–47)
HGB BLD-MCNC: 11.5 G/DL — LOW (ref 12–16)
IMM GRANULOCYTES NFR BLD AUTO: 0.7 % — HIGH (ref 0.1–0.3)
LYMPHOCYTES # BLD AUTO: 1.38 K/UL — SIGNIFICANT CHANGE UP (ref 1.2–3.4)
LYMPHOCYTES # BLD AUTO: 16.6 % — LOW (ref 20.5–51.1)
MAGNESIUM SERPL-MCNC: 1.8 MG/DL — SIGNIFICANT CHANGE UP (ref 1.8–2.4)
MCHC RBC-ENTMCNC: 27.8 PG — SIGNIFICANT CHANGE UP (ref 27–31)
MCHC RBC-ENTMCNC: 32 G/DL — SIGNIFICANT CHANGE UP (ref 32–37)
MCV RBC AUTO: 86.7 FL — SIGNIFICANT CHANGE UP (ref 81–99)
MONOCYTES # BLD AUTO: 0.81 K/UL — HIGH (ref 0.1–0.6)
MONOCYTES NFR BLD AUTO: 9.7 % — HIGH (ref 1.7–9.3)
NEUTROPHILS # BLD AUTO: 5.57 K/UL — SIGNIFICANT CHANGE UP (ref 1.4–6.5)
NEUTROPHILS NFR BLD AUTO: 67 % — SIGNIFICANT CHANGE UP (ref 42.2–75.2)
NRBC # BLD: 0 /100 WBCS — SIGNIFICANT CHANGE UP (ref 0–0)
PLATELET # BLD AUTO: 243 K/UL — SIGNIFICANT CHANGE UP (ref 130–400)
POTASSIUM SERPL-MCNC: 4.1 MMOL/L — SIGNIFICANT CHANGE UP (ref 3.5–5)
POTASSIUM SERPL-SCNC: 4.1 MMOL/L — SIGNIFICANT CHANGE UP (ref 3.5–5)
RBC # BLD: 4.14 M/UL — LOW (ref 4.2–5.4)
RBC # FLD: 13 % — SIGNIFICANT CHANGE UP (ref 11.5–14.5)
SODIUM SERPL-SCNC: 140 MMOL/L — SIGNIFICANT CHANGE UP (ref 135–146)
WBC # BLD: 8.32 K/UL — SIGNIFICANT CHANGE UP (ref 4.8–10.8)
WBC # FLD AUTO: 8.32 K/UL — SIGNIFICANT CHANGE UP (ref 4.8–10.8)

## 2019-01-06 PROCEDURE — 99231 SBSQ HOSP IP/OBS SF/LOW 25: CPT

## 2019-01-06 RX ORDER — LABETALOL HCL 100 MG
200 TABLET ORAL EVERY 6 HOURS
Qty: 0 | Refills: 0 | Status: DISCONTINUED | OUTPATIENT
Start: 2019-01-06 | End: 2019-01-07

## 2019-01-06 RX ORDER — NICARDIPINE HYDROCHLORIDE 30 MG/1
2.5 CAPSULE, EXTENDED RELEASE ORAL
Qty: 40 | Refills: 0 | Status: DISCONTINUED | OUTPATIENT
Start: 2019-01-06 | End: 2019-01-06

## 2019-01-06 RX ORDER — KETOROLAC TROMETHAMINE 30 MG/ML
15 SYRINGE (ML) INJECTION ONCE
Qty: 0 | Refills: 0 | Status: DISCONTINUED | OUTPATIENT
Start: 2019-01-06 | End: 2019-01-06

## 2019-01-06 RX ORDER — NICARDIPINE HYDROCHLORIDE 30 MG/1
5 CAPSULE, EXTENDED RELEASE ORAL
Qty: 40 | Refills: 0 | Status: DISCONTINUED | OUTPATIENT
Start: 2019-01-06 | End: 2019-01-07

## 2019-01-06 RX ADMIN — Medication 650 MILLIGRAM(S): at 13:05

## 2019-01-06 RX ADMIN — MAGNESIUM OXIDE 400 MG ORAL TABLET 400 MILLIGRAM(S): 241.3 TABLET ORAL at 08:27

## 2019-01-06 RX ADMIN — Medication 650 MILLIGRAM(S): at 06:10

## 2019-01-06 RX ADMIN — Medication 650 MILLIGRAM(S): at 01:30

## 2019-01-06 RX ADMIN — NIMODIPINE 30 MILLIGRAM(S): 60 SOLUTION ORAL at 15:02

## 2019-01-06 RX ADMIN — Medication 650 MILLIGRAM(S): at 21:43

## 2019-01-06 RX ADMIN — Medication 100 MILLIGRAM(S): at 21:43

## 2019-01-06 RX ADMIN — MAGNESIUM OXIDE 400 MG ORAL TABLET 400 MILLIGRAM(S): 241.3 TABLET ORAL at 11:47

## 2019-01-06 RX ADMIN — MAGNESIUM OXIDE 400 MG ORAL TABLET 400 MILLIGRAM(S): 241.3 TABLET ORAL at 17:01

## 2019-01-06 RX ADMIN — Medication 200 MILLIGRAM(S): at 13:06

## 2019-01-06 RX ADMIN — Medication 150 MILLIGRAM(S): at 05:37

## 2019-01-06 RX ADMIN — Medication 200 MILLIGRAM(S): at 23:49

## 2019-01-06 RX ADMIN — CHLORHEXIDINE GLUCONATE 1 APPLICATION(S): 213 SOLUTION TOPICAL at 05:38

## 2019-01-06 RX ADMIN — SODIUM CHLORIDE 50 MILLILITER(S): 9 INJECTION INTRAMUSCULAR; INTRAVENOUS; SUBCUTANEOUS at 00:00

## 2019-01-06 RX ADMIN — NICARDIPINE HYDROCHLORIDE 12.5 MG/HR: 30 CAPSULE, EXTENDED RELEASE ORAL at 05:48

## 2019-01-06 RX ADMIN — NIMODIPINE 30 MILLIGRAM(S): 60 SOLUTION ORAL at 00:20

## 2019-01-06 RX ADMIN — Medication 15 MILLIGRAM(S): at 21:30

## 2019-01-06 RX ADMIN — Medication 650 MILLIGRAM(S): at 02:00

## 2019-01-06 RX ADMIN — NIMODIPINE 30 MILLIGRAM(S): 60 SOLUTION ORAL at 08:27

## 2019-01-06 RX ADMIN — NIMODIPINE 30 MILLIGRAM(S): 60 SOLUTION ORAL at 11:47

## 2019-01-06 RX ADMIN — Medication 650 MILLIGRAM(S): at 05:39

## 2019-01-06 RX ADMIN — NIMODIPINE 30 MILLIGRAM(S): 60 SOLUTION ORAL at 02:22

## 2019-01-06 RX ADMIN — CEFTRIAXONE 100 GRAM(S): 500 INJECTION, POWDER, FOR SOLUTION INTRAMUSCULAR; INTRAVENOUS at 10:50

## 2019-01-06 RX ADMIN — Medication 1 TABLET(S): at 21:43

## 2019-01-06 RX ADMIN — Medication 150 MILLIGRAM(S): at 00:19

## 2019-01-06 RX ADMIN — Medication 650 MILLIGRAM(S): at 10:10

## 2019-01-06 RX ADMIN — Medication 650 MILLIGRAM(S): at 10:50

## 2019-01-06 RX ADMIN — SENNA PLUS 2 TABLET(S): 8.6 TABLET ORAL at 21:43

## 2019-01-06 RX ADMIN — Medication 650 MILLIGRAM(S): at 22:15

## 2019-01-06 RX ADMIN — Medication 200 MILLIGRAM(S): at 17:01

## 2019-01-06 RX ADMIN — SODIUM CHLORIDE 50 MILLILITER(S): 9 INJECTION INTRAMUSCULAR; INTRAVENOUS; SUBCUTANEOUS at 15:02

## 2019-01-06 RX ADMIN — Medication 100 MILLIGRAM(S): at 05:37

## 2019-01-06 RX ADMIN — LEVETIRACETAM 400 MILLIGRAM(S): 250 TABLET, FILM COATED ORAL at 05:38

## 2019-01-06 RX ADMIN — Medication 15 MILLIGRAM(S): at 20:59

## 2019-01-06 RX ADMIN — Medication 650 MILLIGRAM(S): at 17:38

## 2019-01-06 RX ADMIN — NIMODIPINE 30 MILLIGRAM(S): 60 SOLUTION ORAL at 23:49

## 2019-01-06 RX ADMIN — NIMODIPINE 30 MILLIGRAM(S): 60 SOLUTION ORAL at 05:37

## 2019-01-06 RX ADMIN — PANTOPRAZOLE SODIUM 40 MILLIGRAM(S): 20 TABLET, DELAYED RELEASE ORAL at 06:25

## 2019-01-06 RX ADMIN — Medication 650 MILLIGRAM(S): at 14:00

## 2019-01-06 RX ADMIN — Medication 650 MILLIGRAM(S): at 17:08

## 2019-01-06 RX ADMIN — NIMODIPINE 30 MILLIGRAM(S): 60 SOLUTION ORAL at 17:55

## 2019-01-06 RX ADMIN — LEVETIRACETAM 400 MILLIGRAM(S): 250 TABLET, FILM COATED ORAL at 17:01

## 2019-01-06 RX ADMIN — NIMODIPINE 30 MILLIGRAM(S): 60 SOLUTION ORAL at 20:41

## 2019-01-06 NOTE — PROGRESS NOTE ADULT - SUBJECTIVE AND OBJECTIVE BOX
________________________________________________________________________________  DAILY PROGRESS NOTE:    ================== SUBJECTIVE ==================    BALDEV KEE  /   33y  /  Female  /  MRN#: [id number]  Patient is a 33y old Female who presents with a chief complaint of SAH , transfer from [facility] (2019 12:42)  Currently admitted to medicine with the primary diagnosis of     HOSPITAL DAY: 7d     ICU DAY:    SUMMARY OF HOSPITAL COURSE TO DATE     34 y/o female  w/ PMH of Gestational HTN, PCOS and asthma transferred from [facility] for management of SAH. As per patient she developed severe headache since 3:30pm on day of presentation. Headache started while patient laying down and described as sudden in onset , diffuse , 8/10 in intensity and progressively worsening since afternoon a/w photophobia , nausea but no vomiting. Headache worsens with head movements. CTH at [facility] showed SAH for which the neurosurgeon at [facility] Dr. Calderon discussed case with dr fontanez and patient was transferred to [facility] for possible intervention.     At [facility], pt underwent cerebral angiogram. Pt underwent MRV after and was found to have ?venous sinus thrombosis on MRV. As per neurology, it can be secondary to artifact. Neurology wanted a repeat MRI with IV contrast.     ICU day 3, pt still has significant headache which improved compared to admission. Pt is currently awaiting for MRI with IV contrast.     ICU Day 4 (1/3), pt's headache improved. Pt underwent MRI w/ contrast yesterday. Spoke with neurology team, concerned for preclampsia post partum and required ob consult. Ob was consulted and doubted eclampsia given risk highest in the first 24hr postpartum. Pt continued to be on Mg and Nicardipine drip as per neuro to maintain Mg of 2-4 and SBP < 120. Pt was started Tylenol standing q4h to prevent headache. She also spiked a fever of 101 yesterday afternoon. Panculture taken and needed f/u.     ICU Day 5 (), pt's headache resolved. Neurology recommended to downgrade pt to stroke unit. Nicardipine to be d/c and switched to labetalol. Pt started Rocephin for UA positive.     ICU Day 7, pt again became hypertensive and was restarted on cardene labetalol was increased. As per neuro, would like to keep patient in ICU for monitoring.     OVERNIGHT EVENTS: hypertensive with SBP at 170.     TODAY: Patient was seen this morning at bedside. Currently, the patient reports headache that is on and off.   Review of systems is otherwise negative.    ================= PRESENT TODAY ==================    1-Damon Catheter: no  2-Central Line: no  3-IV Fluids: NS @ 50  4-Drips: cardene gtt    ================== OBJECTIVE ===================    VITAL SIGNS: Last 24 Hours  T(C): 37.2 (2019 08:00), Max: 37.7 (2019 20:00)  T(F): 98.9 (2019 08:00), Max: 99.9 (2019 20:00)  HR: 80 (2019 12:45) (60 - 94)  BP: 133/76 (2019 12:45) (101/54 - 173/97)  BP(mean): 102 (2019 12:45) (70 - 135)  RR: 20 (2019 12:45) (16 - 32)  SpO2: 99% (2019 12:45) (96% - 100%)    19 @ 07:19 @ 07:00  --------------------------------------------------------  IN: 1387.5 mL / OUT: 1960 mL / NET: -572.5 mL    19 @ 07:19 @ 14:19  --------------------------------------------------------  IN: 320 mL / OUT: 400 mL / NET: -80 mL      PHYSICAL EXAM:  GEN: NAD, comfortable  LUNGS: CTAB, no w/r/r  HEART: RRR, no m/r/g  ABD: soft, NT/ND, +BS  EXT: no edema, PP b/l  NEURO: AAOx3, non-focal neurological exam    ===================== LABS =====================                        11.5   8.32  )-----------( 243      ( 2019 05:08 )             35.9     01-06    140  |  103  |  13  ----------------------------<  85  4.1   |  19  |  0.6<L>    Ca    8.4<L>      2019 05:08  Mg     1.8     -    TPro  5.2<L>  /  Alb  3.1<L>  /  TBili  0.3  /  DBili  <0.2  /  AST  14  /  ALT  11  /  AlkPhos  74  -05                ================= MICROBIOLOGY ================    ================== IMAGING TO DATE ==================    ================== OTHER SIGNIFICANT FINDINGS ==================    ================== ALLERGIES ===================  Alcohol (Flushing)  Tamiflu (Unknown)    ==================== MEDS =====================  acetaminophen   Tablet .. 650 milliGRAM(s) Oral every 4 hours  cefTRIAXone   IVPB 1 Gram(s) IV Intermittent every 24 hours  cefTRIAXone   IVPB      chlorhexidine 4% Liquid 1 Application(s) Topical <User Schedule>  docusate sodium 100 milliGRAM(s) Oral three times a day  labetalol 200 milliGRAM(s) Oral every 6 hours  levETIRAcetam  IVPB 750 milliGRAM(s) IV Intermittent every 12 hours  magnesium oxide 400 milliGRAM(s) Oral three times a day with meals  niCARdipine Infusion 5 mG/Hr IV Continuous <Continuous>  niMODipine 30 milliGRAM(s) Oral every 3 hours  pantoprazole    Tablet 40 milliGRAM(s) Oral before breakfast  senna 2 Tablet(s) Oral at bedtime  sodium chloride 0.9%. 1000 milliLiter(s) IV Continuous <Continuous>    PRN MEDICATIONS  ondansetron Injectable 4 milliGRAM(s) IV Push every 4 hours PRN  traMADol 50 milliGRAM(s) Oral every 6 hours PRN      ================= CONSULTS ==================  Neuro ()   Plan: Strict BP control - Please  keep SBP below 140 if BP rises overnight - please start low dose cardene gttand gently wean off  frequent neuro checks  Tylenol PRN for HA  Call Neuro for any change in exam    NeuroSx ()  cont ICU care  recommend repeat Angiogram  will discuss w attg ________________________________________________________________________________  DAILY PROGRESS NOTE:    ================== SUBJECTIVE ==================    BALDEV KEE  /   33y  /  Female  /  MRN#: [id number]  Patient is a 33y old Female who presents with a chief complaint of SAH , transfer from [facility] (2019 12:42)  Currently admitted to medicine with the primary diagnosis of     HOSPITAL DAY: 7d     ICU DAY: 7d     SUMMARY OF HOSPITAL COURSE TO DATE     34 y/o female  w/ PMH of Gestational HTN, PCOS and asthma transferred from [facility] for management of SAH. As per patient she developed severe headache since 3:30pm on day of presentation. Headache started while patient laying down and described as sudden in onset , diffuse , 8/10 in intensity and progressively worsening since afternoon a/w photophobia , nausea but no vomiting. Headache worsens with head movements. CTH at [facility] showed SAH for which the neurosurgeon at [facility] Dr. Calderon discussed case with dr fontanez and patient was transferred to [facility] for possible intervention.     At [facility], pt underwent cerebral angiogram. Pt underwent MRV after and was found to have ?venous sinus thrombosis on MRV. As per neurology, it can be secondary to artifact. Neurology wanted a repeat MRI with IV contrast.     ICU day 3, pt still has significant headache which improved compared to admission. Pt is currently awaiting for MRI with IV contrast.     ICU Day 4 (1/3), pt's headache improved. Pt underwent MRI w/ contrast yesterday. Spoke with neurology team, concerned for preclampsia post partum and required ob consult. Ob was consulted and doubted eclampsia given risk highest in the first 24hr postpartum. Pt continued to be on Mg and Nicardipine drip as per neuro to maintain Mg of 2-4 and SBP < 120. Pt was started Tylenol standing q4h to prevent headache. She also spiked a fever of 101 yesterday afternoon. Panculture taken and needed f/u.     ICU Day 5 (), pt's headache resolved. Neurology recommended to downgrade pt to stroke unit. Nicardipine to be d/c and switched to labetalol. Pt started Rocephin for UA positive.     ICU Day 7, pt again became hypertensive and was restarted on cardene labetalol was increased. As per neuro, would like to keep patient in ICU for monitoring.     OVERNIGHT EVENTS: hypertensive with SBP at 170.     TODAY: Patient was seen this morning at bedside. Currently, the patient reports headache that is on and off.   Review of systems is otherwise negative.    ================= PRESENT TODAY ==================    1-Damon Catheter: no  2-Central Line: no  3-IV Fluids: NS @ 50  4-Drips: cardene gtt    ================== OBJECTIVE ===================    VITAL SIGNS: Last 24 Hours  T(C): 37.2 (2019 08:00), Max: 37.7 (2019 20:00)  T(F): 98.9 (2019 08:00), Max: 99.9 (2019 20:00)  HR: 80 (2019 12:45) (60 - 94)  BP: 133/76 (2019 12:45) (101/54 - 173/97)  BP(mean): 102 (2019 12:45) (70 - 135)  RR: 20 (2019 12:45) (16 - 32)  SpO2: 99% (2019 12:45) (96% - 100%)    19 @ 07:19 @ 07:00  --------------------------------------------------------  IN: 1387.5 mL / OUT: 1960 mL / NET: -572.5 mL    19 @ 07:19 @ 14:19  --------------------------------------------------------  IN: 320 mL / OUT: 400 mL / NET: -80 mL      PHYSICAL EXAM:  GEN: NAD, comfortable  LUNGS: CTAB, no w/r/r  HEART: RRR, no m/r/g  ABD: soft, NT/ND, +BS  EXT: no edema, PP b/l  NEURO: AAOx3, non-focal neurological exam    ===================== LABS =====================                        11.5   8.32  )-----------( 243      ( 2019 05:08 )             35.9     01-06    140  |  103  |  13  ----------------------------<  85  4.1   |  19  |  0.6<L>    Ca    8.4<L>      2019 05:08  Mg     1.8     -    TPro  5.2<L>  /  Alb  3.1<L>  /  TBili  0.3  /  DBili  <0.2  /  AST  14  /  ALT  11  /  AlkPhos  74  -05      ================= MICROBIOLOGY ================    ================== IMAGING TO DATE ==================    ================== OTHER SIGNIFICANT FINDINGS ==================    ================== ALLERGIES ===================  Alcohol (Flushing)  Tamiflu (Unknown)    ==================== MEDS =====================  acetaminophen   Tablet .. 650 milliGRAM(s) Oral every 4 hours  cefTRIAXone   IVPB 1 Gram(s) IV Intermittent every 24 hours  cefTRIAXone   IVPB      chlorhexidine 4% Liquid 1 Application(s) Topical <User Schedule>  docusate sodium 100 milliGRAM(s) Oral three times a day  labetalol 200 milliGRAM(s) Oral every 6 hours  levETIRAcetam  IVPB 750 milliGRAM(s) IV Intermittent every 12 hours  magnesium oxide 400 milliGRAM(s) Oral three times a day with meals  niCARdipine Infusion 5 mG/Hr IV Continuous <Continuous>  niMODipine 30 milliGRAM(s) Oral every 3 hours  pantoprazole    Tablet 40 milliGRAM(s) Oral before breakfast  senna 2 Tablet(s) Oral at bedtime  sodium chloride 0.9%. 1000 milliLiter(s) IV Continuous <Continuous>    PRN MEDICATIONS  ondansetron Injectable 4 milliGRAM(s) IV Push every 4 hours PRN  traMADol 50 milliGRAM(s) Oral every 6 hours PRN      ================= CONSULTS ==================  Neuro ()   Plan: Strict BP control - Please  keep SBP below 140 if BP rises overnight - please start low dose cardene gttand gently wean off  frequent neuro checks  Tylenol PRN for HA  Call Neuro for any change in exam    NeuroSx ()  cont ICU care  recommend repeat Angiogram  will discuss w attg

## 2019-01-06 NOTE — PROGRESS NOTE ADULT - ASSESSMENT
34 yo F w/ PMH of gestational HTN, asthma and PCOS transferred from [facility] for management of SAH with CTH showing small SAH in left frontotemporal region.    # Fever r/o infection   - urine culture contaminated, blood culture no growth  - Rocephin 1g q24h (started 1/4) for 5d   - Tylenol around the clock     #SAH s/p diagnostic cerebral angiogram   - CTA head and neck (12/30): negative for any large vessel occlusion or significant stenosis  - MRV (1/1): concerning for venous sinus thrombosis  - MR Head (1/2): left temproal superficial cavernoma  - as per neurology, area of bleed does not correspond with the location of the cavernoma and need to rule out other cause  - increased labetolol 200 mg PO q6h  - c/w nicardipine 10 mg PO q8h    - target SBP goal to 100 - 130  - c/w nimodipine 30 mg q3 and NS @ 50 to prevent vasospasm  - c/w keppra 750 mg q12  - Mg 400 mg PO q8h  - c/w neuro checks q4h   - f/u BMP and Mg    # Hypertension, uncontrolled, r/o secondary cause  - less likely to be eclampsia   - r/o polycystic kidney disease, order US kidney  - c/w labetalol procardia    # Induced hypermagnesemia   - change to Mg 400 mg PO q8h   - f/u BMP  - maintains Mg between 2 - 4    # Intractable headache / nausea secondary to SAH  - zofran PRN for nausea   - tylenol standing for headache  - tramadol PRN for severe pain    Diet: DASH/TLC   GI ppx: ( protonix )  DVT ppx: ( SCD )  Activity: ambulate as tolerated  Code status: Full Code ( X ) / DNR (  ) / DNI (  )  Disposition: from home    Awaiting downgrade from ICU to stroke unit 32 yo F w/ PMH of gestational HTN, asthma and PCOS transferred from [facility] for management of SAH with CTH showing small SAH in left frontotemporal region.    # Fever r/o infection   - urine culture contaminated, blood culture no growth  - Rocephin 1g q24h (started 1/4) for 5d   - Tylenol around the clock     #SAH s/p diagnostic cerebral angiogram   - CTA head and neck (12/30): negative for any large vessel occlusion or significant stenosis  - MRV (1/1): concerning for venous sinus thrombosis  - MR Head (1/2): left temproal superficial cavernoma  - as per neurology, area of bleed does not correspond with the location of the cavernoma and need to rule out other cause  - increased labetolol 200 mg PO q6h  - wean off cardene drip  - c/w nicardipine 10 mg PO q8h    - target SBP goal to 100 - 130  - c/w nimodipine 30 mg q3 and NS @ 50 to prevent vasospasm  - c/w keppra 750 mg q12  - Mg 400 mg PO q8h  - c/w neuro checks q4h   - f/u BMP and Mg    # Hypertension, uncontrolled, r/o secondary cause  - less likely to be eclampsia   - ruled out polycystic kidney disease, order US kidney  - c/w labetalol, procardia  - wean off cardene drip  - target SBP to <130    # Induced hypermagnesemia   - change to Mg 400 mg PO q8h   - f/u BMP  - maintains Mg between 2 - 4    # Intractable headache / nausea secondary to SAH  - zofran PRN for nausea   - tylenol standing for headache  - tramadol PRN for severe pain    Diet: DASH/TLC   GI ppx: ( protonix )  DVT ppx: ( SCD )  Activity: ambulate as tolerated  Code status: Full Code ( X ) / DNR (  ) / DNI (  )  Disposition: from home

## 2019-01-06 NOTE — DIETITIAN INITIAL EVALUATION ADULT. - PERTINENT MEDS FT
abx, labetalol, mag-oxide, tramadol, acetaminophen, tramadol, protonix, docusate, senna, ondansetron

## 2019-01-06 NOTE — PROGRESS NOTE ADULT - ASSESSMENT
34 yo F w/ PMH of gestational HTN, asthma and PCOS transferred from [facility] for management of SAH with CTH showing small SAH in left frontotemporal region.    # Fever r/o infection  reloved on atb   - f/u panculture  - UA positive, f/u urine culture  - continue Rocephin 1g q24h (1/4) for 5d   - Tylenol around the clock     #SAH s/p diagnostic cerebral angiogram   - CTA head and neck (12/30): negative for any large vessel occlusion or significant stenosis  - MRV (1/1): concerning for venous sinus thrombosis  - MR Head (1/2): left temproal superficial cavernoma  - as per neurology, area of bleed does not correspond with the location of the cavernoma and need to rule out other cause  - change to labetolol 150 mg PO q6h  - c/w procardia 10 mg PO q8h    - d/c nicardipine drip  - target SBP goal to 100 - 130  - c/w nimodipine 30 mg q3 and NS @ 50 to prevent vasospasm  - c/w keppra 750 mg q12  - change to Mg 400 mg PO q8h  - c/w neuro checks q4h   - f/u BMP and Mg    # Hypertension, uncontrolled, r/o secondary cause  - less likely to be eclampsia   - r/o polycystic kidney disease, order US kidney  - c/w labetalol   increase to 200 q  and Nimopdipin      watson drip for sbp < 140 fine tuning    # Induced hypermagnesemia   - change to Mg 400 mg PO q8h   - f/u BMP  - maintains Mg between 2 - 4    # Intractable headache / nausea secondary to SAH  - zofran PRN for nausea   - tylenol standing for headache  - tramadol PRN for severe pain    Diet: DASH/TLC   GI ppx: ( protonix )  DVT ppx: ( SCD )  Activity: ambulate as tolerated  Code status: Full Code ( X ) / DNR (  ) / DNI (  )  Disposition: from home,    can be transfered to stroke unite

## 2019-01-06 NOTE — PROGRESS NOTE ADULT - SUBJECTIVE AND OBJECTIVE BOX
Subjective: 33yFemale with a pmhx of SAH  ^SAH  Family history of hypertension (Mother)  Family history of high cholesterol (Father)  Handoff  Gestational hypertension  Asthma  PCOS (polycystic ovarian syndrome)    HD#7    S/P SAH    Pt seen and examined at bedside. Pt states she had headache this morning.  Relieved slightly with Tylenol. States she ambulated independantly.    Allergies    Alcohol (Flushing)  Tamiflu (Unknown)    Intolerances        Vital Signs Last 24 Hrs  T(C): 37.2 (06 Jan 2019 08:00), Max: 37.7 (05 Jan 2019 20:00)  T(F): 98.9 (06 Jan 2019 08:00), Max: 99.9 (05 Jan 2019 20:00)  HR: 80 (06 Jan 2019 10:15) (60 - 94)  BP: 145/91 (06 Jan 2019 10:15) (101/54 - 173/97)  BP(mean): 118 (06 Jan 2019 10:15) (70 - 135)  RR: 32 (06 Jan 2019 10:15) (16 - 32)  SpO2: 100% (06 Jan 2019 10:15) (96% - 100%)      acetaminophen   Tablet .. 650 milliGRAM(s) Oral every 4 hours  cefTRIAXone   IVPB 1 Gram(s) IV Intermittent every 24 hours  cefTRIAXone   IVPB      chlorhexidine 4% Liquid 1 Application(s) Topical <User Schedule>  docusate sodium 100 milliGRAM(s) Oral three times a day  labetalol 150 milliGRAM(s) Oral every 6 hours  levETIRAcetam  IVPB 750 milliGRAM(s) IV Intermittent every 12 hours  magnesium oxide 400 milliGRAM(s) Oral three times a day with meals  niCARdipine Infusion 2.5 mG/Hr IV Continuous <Continuous>  niMODipine 30 milliGRAM(s) Oral every 3 hours  ondansetron Injectable 4 milliGRAM(s) IV Push every 4 hours PRN  pantoprazole    Tablet 40 milliGRAM(s) Oral before breakfast  senna 2 Tablet(s) Oral at bedtime  sodium chloride 0.9%. 1000 milliLiter(s) IV Continuous <Continuous>  traMADol 50 milliGRAM(s) Oral every 6 hours PRN        01-05-19 @ 07:01  -  01-06-19 @ 07:00  --------------------------------------------------------  IN: 1387.5 mL / OUT: 1960 mL / NET: -572.5 mL    01-06-19 @ 07:01  -  01-06-19 @ 10:38  --------------------------------------------------------  IN: 180 mL / OUT: 250 mL / NET: -70 mL          Exam:  AAOX3. Verbal function intact  tongue midline, facial motions symmetric  PERRL, EOMI  Pronator Drift: none  Finger to Nose intact  Motor: MAEx4, 5/5 power in b/l UE and LE  Sensation: intact b/l        CBC Full  -  ( 06 Jan 2019 05:08 )  WBC Count : 8.32 K/uL  Hemoglobin : 11.5 g/dL  Hematocrit : 35.9 %  Platelet Count - Automated : 243 K/uL  Mean Cell Volume : 86.7 fL  Mean Cell Hemoglobin : 27.8 pg  Mean Cell Hemoglobin Concentration : 32.0 g/dL  Auto Neutrophil # : 5.57 K/uL  Auto Lymphocyte # : 1.38 K/uL  Auto Monocyte # : 0.81 K/uL  Auto Eosinophil # : 0.44 K/uL  Auto Basophil # : 0.06 K/uL  Auto Neutrophil % : 67.0 %  Auto Lymphocyte % : 16.6 %  Auto Monocyte % : 9.7 %  Auto Eosinophil % : 5.3 %  Auto Basophil % : 0.7 %    01-06    140  |  103  |  13  ----------------------------<  85  4.1   |  19  |  0.6<L>    Ca    8.4<L>      06 Jan 2019 05:08  Mg     1.8     01-06    TPro  5.2<L>  /  Alb  3.1<L>  /  TBili  0.3  /  DBili  <0.2  /  AST  14  /  ALT  11  /  AlkPhos  74  01-05          Assessment/Plan: as above  cont ICU care  recommend repeat Angiogram  will discuss w attg

## 2019-01-06 NOTE — DIETITIAN INITIAL EVALUATION ADULT. - ENERGY NEEDS
[id number] kcal/day (MSJ x 1.1-1.2) BMI 28-30 considered. no BF.  55-65 g/day (1.1-1.3 g/kg of IBW) IBW was used but aim at higher end  Per ICU team

## 2019-01-06 NOTE — DIETITIAN INITIAL EVALUATION ADULT. - PHYSICAL APPEARANCE
obese/BMI is 33.8 (with current post-partum weight of 83.9kg). Pt reported that her weight before pregnancy was 173# which is BMI 29, then gained a total of 19# during pregnancy. Current EMR weight reflects to BMI>30 due to extra weight post-partum).

## 2019-01-06 NOTE — DIETITIAN INITIAL EVALUATION ADULT. - MD RECOMMEND
other/Please add MVI q24hr. Add Ensure enlive q24hr (240mg sodium per bottle), Ensure Pudding (85mg sodium per container) q12hr. and change diet to LOW SODIUM only. No need low fat.

## 2019-01-06 NOTE — PROGRESS NOTE ADULT - SUBJECTIVE AND OBJECTIVE BOX
Over Night Events:    Bp[ up was strted arminda gerald to keep sbp< 140  has headache    ROS:  See HPI    PHYSICAL EXAM    ICU Vital Signs Last 24 Hrs  T(C): 37.2 (06 Jan 2019 08:00), Max: 37.7 (05 Jan 2019 20:00)  T(F): 98.9 (06 Jan 2019 08:00), Max: 99.9 (05 Jan 2019 20:00)  HR: 84 (06 Jan 2019 12:15) (60 - 94)  BP: 129/69 (06 Jan 2019 12:15) (101/54 - 173/97)  BP(mean): 87 (06 Jan 2019 12:15) (70 - 135)  ABP: --  ABP(mean): --  RR: 20 (06 Jan 2019 12:15) (16 - 32)  SpO2: 98% (06 Jan 2019 12:15) (96% - 100%)    Alcohol (Flushing)  Tamiflu (Unknown)    General:AO x3  HEENT:           Nl     Lymph Nodes: NO cervical LN   Lungs: Bilateral BS  Cardiovascular: Regular   Abdomen: Soft, Positive BS  Extremities: No clubbing   Skin: Nl  Neurological: Nl    01-05-19 @ 07:01 - 01-06-19 @ 07:00  --------------------------------------------------------  IN: 1387.5 mL / OUT: 1960 mL / NET: -572.5 mL    01-06-19 @ 07:01 - 01-06-19 @ 12:43  --------------------------------------------------------  IN: 320 mL / OUT: 400 mL / NET: -80 mL        LABS:                          11.5   8.32  )-----------( 243      ( 06 Jan 2019 05:08 )             35.9                                               01-06    140  |  103  |  13  ----------------------------<  85  4.1   |  19  |  0.6<L>    Ca    8.4<L>      06 Jan 2019 05:08  Mg     1.8     01-06    TPro  5.2<L>  /  Alb  3.1<L>  /  TBili  0.3  /  DBili  <0.2  /  AST  14  /  ALT  11  /  AlkPhos  74  01-05                                                                                           LIVER FUNCTIONS - ( 05 Jan 2019 05:26 )  Alb: 3.1 g/dL / Pro: 5.2 g/dL / ALK PHOS: 74 U/L / ALT: 11 U/L / AST: 14 U/L / GGT: x                                                                                                                                       MEDICATIONS  (STANDING):  acetaminophen   Tablet .. 650 milliGRAM(s) Oral every 4 hours  cefTRIAXone   IVPB 1 Gram(s) IV Intermittent every 24 hours  cefTRIAXone   IVPB      chlorhexidine 4% Liquid 1 Application(s) Topical <User Schedule>  docusate sodium 100 milliGRAM(s) Oral three times a day  labetalol 200 milliGRAM(s) Oral every 6 hours  levETIRAcetam  IVPB 750 milliGRAM(s) IV Intermittent every 12 hours  magnesium oxide 400 milliGRAM(s) Oral three times a day with meals  niCARdipine Infusion 5 mG/Hr (25 mL/Hr) IV Continuous <Continuous>  niMODipine 30 milliGRAM(s) Oral every 3 hours  pantoprazole    Tablet 40 milliGRAM(s) Oral before breakfast  senna 2 Tablet(s) Oral at bedtime  sodium chloride 0.9%. 1000 milliLiter(s) (50 mL/Hr) IV Continuous <Continuous>    MEDICATIONS  (PRN):  ondansetron Injectable 4 milliGRAM(s) IV Push every 4 hours PRN Nausea and/or Vomiting  traMADol 50 milliGRAM(s) Oral every 6 hours PRN Severe Pain (7 - 10)      Xrays:                                                                                     ECHO    IMPRESSION: Over Night Events:    Bp[ up was strted arminda gerald to keep sbp< 140  has headache    ROS:  See HPI  HPI:  34 y/o female  who presents 2 days post partum with hx of gestational hypertension, PCOS and asthma transferred from [facility] for management of SAH. As per patient she developed severe headache since 3:30pm on day of presentation. Headache started while patient laying down and described as sudden in onset , diffuse , 8/10 in intensity and progressively worsening since afternoon a/w photophobia , nausea but no vomiting. Headache worsens with head movements. CTH at [facility] showed SAH for which the neurosurgeon at [facility] Dr. Calderon discussed case with dr fontanez and patient was transferred to [facility] for possible intervention like coiling in am .   No history of similar events in past    VS upon admission to MICU bp 120/66 , hr 86, tmax 98.7 (31 Dec 2018 02:15)    PHYSICAL EXAM    ICU Vital Signs Last 24 Hrs  T(C): 37.2 (2019 08:00), Max: 37.7 (2019 20:00)  T(F): 98.9 (2019 08:00), Max: 99.9 (2019 20:00)  HR: 84 (2019 12:15) (60 - 94)  BP: 129/69 (2019 12:15) (101/54 - 173/97)  BP(mean): 87 (2019 12:15) (70 - 135)  ABP: --  ABP(mean): --  RR: 20 (2019 12:15) (16 - 32)  SpO2: 98% (2019 12:15) (96% - 100%)    Alcohol (Flushing)  Tamiflu (Unknown)    General:AO x3  HEENT:           Nl     Lymph Nodes: NO cervical LN   Lungs: Bilateral BS  Cardiovascular: Regular   Abdomen: Soft, Positive BS  Extremities: No clubbing   Skin: Nl  Neurological: Nl    19 @ 07:01  -  19 @ 07:00  --------------------------------------------------------  IN: 1387.5 mL / OUT: 1960 mL / NET: -572.5 mL    19 @ 07:01  -  19 @ 12:43  --------------------------------------------------------  IN: 320 mL / OUT: 400 mL / NET: -80 mL        LABS:                          11.5   8.32  )-----------( 243      ( 2019 05:08 )             35.9                                                   140  |  103  |  13  ----------------------------<  85  4.1   |  19  |  0.6<L>    Ca    8.4<L>      2019 05:08  Mg     1.8         TPro  5.2<L>  /  Alb  3.1<L>  /  TBili  0.3  /  DBili  <0.2  /  AST  14  /  ALT  11  /  AlkPhos  74                                                                                             LIVER FUNCTIONS - ( 2019 05:26 )  Alb: 3.1 g/dL / Pro: 5.2 g/dL / ALK PHOS: 74 U/L / ALT: 11 U/L / AST: 14 U/L / GGT: x                                                                                                                                       MEDICATIONS  (STANDING):  acetaminophen   Tablet .. 650 milliGRAM(s) Oral every 4 hours  cefTRIAXone   IVPB 1 Gram(s) IV Intermittent every 24 hours  cefTRIAXone   IVPB      chlorhexidine 4% Liquid 1 Application(s) Topical <User Schedule>  docusate sodium 100 milliGRAM(s) Oral three times a day  labetalol 200 milliGRAM(s) Oral every 6 hours  levETIRAcetam  IVPB 750 milliGRAM(s) IV Intermittent every 12 hours  magnesium oxide 400 milliGRAM(s) Oral three times a day with meals  niCARdipine Infusion 5 mG/Hr (25 mL/Hr) IV Continuous <Continuous>  niMODipine 30 milliGRAM(s) Oral every 3 hours  pantoprazole    Tablet 40 milliGRAM(s) Oral before breakfast  senna 2 Tablet(s) Oral at bedtime  sodium chloride 0.9%. 1000 milliLiter(s) (50 mL/Hr) IV Continuous <Continuous>    MEDICATIONS  (PRN):  ondansetron Injectable 4 milliGRAM(s) IV Push every 4 hours PRN Nausea and/or Vomiting  traMADol 50 milliGRAM(s) Oral every 6 hours PRN Severe Pain (7 - 10)      Xrays:                                                                                     ECHO    IMPRESSION:

## 2019-01-06 NOTE — DIETITIAN INITIAL EVALUATION ADULT. - ORAL INTAKE PTA
good/PTA when she was pregnant she was eating well. SHe has always been a sweet person. Not into salty foods. Takes pre- vitamin. Omega-3 DHA and iron supplement. NKFA.

## 2019-01-06 NOTE — DIETITIAN INITIAL EVALUATION ADULT. - OTHER INFO
transferred from [facility]. p/w 2 days post-partum w/ hx of gestational HTN. a/w headache a/w photophobia and nausea. Pain mgmt now. SAH s/p diagnostic cerebral angiogram s/p CTA head/neck, MRV, and MR head. HTN uncontrolled likely eclampsia. Zofran for nausea PRN. pt currently on IVF.

## 2019-01-06 NOTE — DIETITIAN INITIAL EVALUATION ADULT. - DIET TYPE
pt is eating fairly, depending to her headache pain. Fluctuation between 25-75% per pt. Agreed to try supplements/DASH/TLC (sodium and cholesterol restricted diet)

## 2019-01-07 LAB
ANION GAP SERPL CALC-SCNC: 17 MMOL/L — HIGH (ref 7–14)
BASOPHILS # BLD AUTO: 0.06 K/UL — SIGNIFICANT CHANGE UP (ref 0–0.2)
BASOPHILS NFR BLD AUTO: 0.9 % — SIGNIFICANT CHANGE UP (ref 0–1)
BUN SERPL-MCNC: 14 MG/DL — SIGNIFICANT CHANGE UP (ref 10–20)
CALCIUM SERPL-MCNC: 8.8 MG/DL — SIGNIFICANT CHANGE UP (ref 8.5–10.1)
CHLORIDE SERPL-SCNC: 102 MMOL/L — SIGNIFICANT CHANGE UP (ref 98–110)
CO2 SERPL-SCNC: 19 MMOL/L — SIGNIFICANT CHANGE UP (ref 17–32)
CREAT SERPL-MCNC: 0.5 MG/DL — LOW (ref 0.7–1.5)
EOSINOPHIL # BLD AUTO: 0.46 K/UL — SIGNIFICANT CHANGE UP (ref 0–0.7)
EOSINOPHIL NFR BLD AUTO: 7.1 % — SIGNIFICANT CHANGE UP (ref 0–8)
GLUCOSE SERPL-MCNC: 88 MG/DL — SIGNIFICANT CHANGE UP (ref 70–99)
HCT VFR BLD CALC: 39 % — SIGNIFICANT CHANGE UP (ref 37–47)
HGB BLD-MCNC: 13 G/DL — SIGNIFICANT CHANGE UP (ref 12–16)
IMM GRANULOCYTES NFR BLD AUTO: 1.1 % — HIGH (ref 0.1–0.3)
LYMPHOCYTES # BLD AUTO: 1.04 K/UL — LOW (ref 1.2–3.4)
LYMPHOCYTES # BLD AUTO: 16 % — LOW (ref 20.5–51.1)
MAGNESIUM SERPL-MCNC: 1.9 MG/DL — SIGNIFICANT CHANGE UP (ref 1.8–2.4)
MCHC RBC-ENTMCNC: 28.7 PG — SIGNIFICANT CHANGE UP (ref 27–31)
MCHC RBC-ENTMCNC: 33.3 G/DL — SIGNIFICANT CHANGE UP (ref 32–37)
MCV RBC AUTO: 86.1 FL — SIGNIFICANT CHANGE UP (ref 81–99)
MONOCYTES # BLD AUTO: 0.63 K/UL — HIGH (ref 0.1–0.6)
MONOCYTES NFR BLD AUTO: 9.7 % — HIGH (ref 1.7–9.3)
NEUTROPHILS # BLD AUTO: 4.25 K/UL — SIGNIFICANT CHANGE UP (ref 1.4–6.5)
NEUTROPHILS NFR BLD AUTO: 65.2 % — SIGNIFICANT CHANGE UP (ref 42.2–75.2)
NRBC # BLD: 0 /100 WBCS — SIGNIFICANT CHANGE UP (ref 0–0)
PLATELET # BLD AUTO: 258 K/UL — SIGNIFICANT CHANGE UP (ref 130–400)
POTASSIUM SERPL-MCNC: 4 MMOL/L — SIGNIFICANT CHANGE UP (ref 3.5–5)
POTASSIUM SERPL-SCNC: 4 MMOL/L — SIGNIFICANT CHANGE UP (ref 3.5–5)
RBC # BLD: 4.53 M/UL — SIGNIFICANT CHANGE UP (ref 4.2–5.4)
RBC # FLD: 12.7 % — SIGNIFICANT CHANGE UP (ref 11.5–14.5)
SODIUM SERPL-SCNC: 138 MMOL/L — SIGNIFICANT CHANGE UP (ref 135–146)
WBC # BLD: 6.51 K/UL — SIGNIFICANT CHANGE UP (ref 4.8–10.8)
WBC # FLD AUTO: 6.51 K/UL — SIGNIFICANT CHANGE UP (ref 4.8–10.8)

## 2019-01-07 PROCEDURE — ZZZZZ: CPT

## 2019-01-07 PROCEDURE — 93970 EXTREMITY STUDY: CPT | Mod: 26

## 2019-01-07 RX ORDER — NIMODIPINE 60 MG/10ML
30 SOLUTION ORAL
Qty: 0 | Refills: 0 | Status: DISCONTINUED | OUTPATIENT
Start: 2019-01-07 | End: 2019-01-10

## 2019-01-07 RX ORDER — LABETALOL HCL 100 MG
300 TABLET ORAL EVERY 6 HOURS
Qty: 0 | Refills: 0 | Status: DISCONTINUED | OUTPATIENT
Start: 2019-01-07 | End: 2019-01-10

## 2019-01-07 RX ORDER — KETOROLAC TROMETHAMINE 30 MG/ML
10 SYRINGE (ML) INJECTION EVERY 8 HOURS
Qty: 0 | Refills: 0 | Status: DISCONTINUED | OUTPATIENT
Start: 2019-01-07 | End: 2019-01-07

## 2019-01-07 RX ORDER — HYDRALAZINE HCL 50 MG
10 TABLET ORAL EVERY 6 HOURS
Qty: 0 | Refills: 0 | Status: DISCONTINUED | OUTPATIENT
Start: 2019-01-07 | End: 2019-01-10

## 2019-01-07 RX ORDER — LISINOPRIL 2.5 MG/1
5 TABLET ORAL DAILY
Qty: 0 | Refills: 0 | Status: DISCONTINUED | OUTPATIENT
Start: 2019-01-07 | End: 2019-01-07

## 2019-01-07 RX ORDER — KETOROLAC TROMETHAMINE 30 MG/ML
10 SYRINGE (ML) INJECTION EVERY 8 HOURS
Qty: 0 | Refills: 0 | Status: DISCONTINUED | OUTPATIENT
Start: 2019-01-07 | End: 2019-01-08

## 2019-01-07 RX ORDER — ACETAMINOPHEN 500 MG
650 TABLET ORAL EVERY 4 HOURS
Qty: 0 | Refills: 0 | Status: DISCONTINUED | OUTPATIENT
Start: 2019-01-07 | End: 2019-01-10

## 2019-01-07 RX ORDER — SODIUM CHLORIDE 9 MG/ML
1000 INJECTION INTRAMUSCULAR; INTRAVENOUS; SUBCUTANEOUS
Qty: 0 | Refills: 0 | Status: DISCONTINUED | OUTPATIENT
Start: 2019-01-07 | End: 2019-01-09

## 2019-01-07 RX ADMIN — Medication 10 MILLIGRAM(S): at 20:50

## 2019-01-07 RX ADMIN — Medication 650 MILLIGRAM(S): at 14:10

## 2019-01-07 RX ADMIN — Medication 10 MILLIGRAM(S): at 14:10

## 2019-01-07 RX ADMIN — Medication 10 MILLIGRAM(S): at 04:02

## 2019-01-07 RX ADMIN — NIMODIPINE 30 MILLIGRAM(S): 60 SOLUTION ORAL at 06:08

## 2019-01-07 RX ADMIN — CEFTRIAXONE 100 GRAM(S): 500 INJECTION, POWDER, FOR SOLUTION INTRAMUSCULAR; INTRAVENOUS at 10:07

## 2019-01-07 RX ADMIN — NIMODIPINE 30 MILLIGRAM(S): 60 SOLUTION ORAL at 11:36

## 2019-01-07 RX ADMIN — Medication 10 MILLIGRAM(S): at 13:33

## 2019-01-07 RX ADMIN — Medication 650 MILLIGRAM(S): at 23:43

## 2019-01-07 RX ADMIN — Medication 650 MILLIGRAM(S): at 13:32

## 2019-01-07 RX ADMIN — NIMODIPINE 30 MILLIGRAM(S): 60 SOLUTION ORAL at 02:04

## 2019-01-07 RX ADMIN — MAGNESIUM OXIDE 400 MG ORAL TABLET 400 MILLIGRAM(S): 241.3 TABLET ORAL at 11:36

## 2019-01-07 RX ADMIN — Medication 200 MILLIGRAM(S): at 23:45

## 2019-01-07 RX ADMIN — Medication 10 MILLIGRAM(S): at 21:05

## 2019-01-07 RX ADMIN — Medication 200 MILLIGRAM(S): at 11:36

## 2019-01-07 RX ADMIN — LEVETIRACETAM 400 MILLIGRAM(S): 250 TABLET, FILM COATED ORAL at 18:19

## 2019-01-07 RX ADMIN — Medication 650 MILLIGRAM(S): at 11:34

## 2019-01-07 RX ADMIN — Medication 200 MILLIGRAM(S): at 06:07

## 2019-01-07 RX ADMIN — NIMODIPINE 30 MILLIGRAM(S): 60 SOLUTION ORAL at 18:19

## 2019-01-07 RX ADMIN — Medication 650 MILLIGRAM(S): at 10:06

## 2019-01-07 RX ADMIN — NIMODIPINE 30 MILLIGRAM(S): 60 SOLUTION ORAL at 23:46

## 2019-01-07 RX ADMIN — MAGNESIUM OXIDE 400 MG ORAL TABLET 400 MILLIGRAM(S): 241.3 TABLET ORAL at 17:12

## 2019-01-07 RX ADMIN — NIMODIPINE 30 MILLIGRAM(S): 60 SOLUTION ORAL at 14:24

## 2019-01-07 RX ADMIN — NIMODIPINE 30 MILLIGRAM(S): 60 SOLUTION ORAL at 08:02

## 2019-01-07 RX ADMIN — Medication 100 MILLIGRAM(S): at 06:08

## 2019-01-07 RX ADMIN — Medication 650 MILLIGRAM(S): at 02:04

## 2019-01-07 RX ADMIN — Medication 300 MILLIGRAM(S): at 18:19

## 2019-01-07 RX ADMIN — Medication 650 MILLIGRAM(S): at 06:08

## 2019-01-07 RX ADMIN — LEVETIRACETAM 400 MILLIGRAM(S): 250 TABLET, FILM COATED ORAL at 06:08

## 2019-01-07 RX ADMIN — NIMODIPINE 30 MILLIGRAM(S): 60 SOLUTION ORAL at 19:47

## 2019-01-07 RX ADMIN — PANTOPRAZOLE SODIUM 40 MILLIGRAM(S): 20 TABLET, DELAYED RELEASE ORAL at 06:08

## 2019-01-07 RX ADMIN — MAGNESIUM OXIDE 400 MG ORAL TABLET 400 MILLIGRAM(S): 241.3 TABLET ORAL at 08:03

## 2019-01-07 RX ADMIN — Medication 650 MILLIGRAM(S): at 02:03

## 2019-01-07 RX ADMIN — Medication 10 MILLIGRAM(S): at 04:17

## 2019-01-07 RX ADMIN — NIMODIPINE 30 MILLIGRAM(S): 60 SOLUTION ORAL at 22:07

## 2019-01-07 RX ADMIN — SODIUM CHLORIDE 50 MILLILITER(S): 9 INJECTION INTRAMUSCULAR; INTRAVENOUS; SUBCUTANEOUS at 10:12

## 2019-01-07 RX ADMIN — Medication 1 TABLET(S): at 11:36

## 2019-01-07 NOTE — PROGRESS NOTE ADULT - SUBJECTIVE AND OBJECTIVE BOX
33yFemale    Chief Complaint:  Sub Arachnoid Hemorrhage.    Acute Problems:  -Subarachnoid Hemorrhage  -HTN  -Prevent vasospasm.    History:  1 week post partum.    Yesterday's Plan:  -Discontinue Cardene.  -OOB to chair.  -CT perfusion.    Last 24 hour updates:  -Improved headache.  -Cardene discontinued.      Ancillary Management:  Chest PT[]  Head of bed >35 [x]  Out of bed to chair [x]  PT/OT/ST []  Spirometry[]  DVT prophalaxis[x]    Medications:  acetaminophen   Tablet .. 650 milliGRAM(s) Oral every 4 hours  cefTRIAXone   IVPB 1 Gram(s) IV Intermittent every 24 hours  cefTRIAXone   IVPB      chlorhexidine 4% Liquid 1 Application(s) Topical <User Schedule>  docusate sodium 100 milliGRAM(s) Oral three times a day  labetalol 200 milliGRAM(s) Oral every 6 hours  levETIRAcetam  IVPB 750 milliGRAM(s) IV Intermittent every 12 hours  magnesium oxide 400 milliGRAM(s) Oral three times a day with meals  multivitamin 1 Tablet(s) Oral daily  niCARdipine Infusion 5 mG/Hr IV Continuous <Continuous>  niMODipine 30 milliGRAM(s) Oral every 3 hours  pantoprazole    Tablet 40 milliGRAM(s) Oral before breakfast  senna 2 Tablet(s) Oral at bedtime  sodium chloride 0.9%. 1000 milliLiter(s) IV Continuous <Continuous>    Vitals  T(F): 95.8 (01-07-19 @ 08:00), Max: 98.6 (01-06-19 @ 16:00)  HR: 70 (01-07-19 @ 09:00) (62 - 92)  BP: 106/73 (01-07-19 @ 09:00) (99/54 - 151/87)  RR: 24 (01-07-19 @ 09:00) (13 - 33)  SpO2: 99% (01-07-19 @ 09:00) (96% - 100%)    Neuro Exam:  Awake []no[x]spontaneously[]occasionally[]to stimuli  AI [x]y[]n   Following commands:[x]y[]n  Oriented:[]0[]1[]2[x]3    Tracking:[x]y[]n  Language:intact  Time off sedation for exam:  Pupils:  Right  3>2       Left    3>2              Corneal:+       Gag reflex:+       EOMI:intact      EVD ICP:             Level(cm):          24hr(ml):                             CSF:   W:      R:     C:    P:     G:     LA:    LOC:   0   Questions: 0      Commands: 0   VF:    0   Gaze:  0   Facial:      0  RUE:  0 RLE:0    LUE:   0  LLE:0  Ataxia:            0  Sensory:0  Language:     0     Dysarthria:0  Extinction:0    NIHSS:0    GCS: 15      Hunt&Balbuena:2   m-Al: 2     Last CTH:  < from: CT Head No Cont (01.06.19 @ 20:18) >  IMPRESSION:  In comparison to the previous head CT 12/30/2018:    1.  No CT evidence of acute large territory infarct.    2.  The previously seen subarachnoid hemorrhage has nearly resolved.    3.  Unchanged 9 mm rounded lesion within the left frontotemporal region,   likely cavernoma on MRI.    Last CTP:    IMPRESSION:     1.  No evidence of major vascular stenosis or occlusion. No definite   perfusion abnormality demonstrated.    2.  Utilizing the lower threshold index for ischemia (Tmax >4s) there are   areas of relative ischemia in the white matter of bilateral frontal,   temporal and parietal lobes (total volume 62 mL). This is of uncertain   clinical significance. Given clinical concern for vasospasm, attention on   follow up imaging is recommended.    Last MRI:  < from: MR Head w/ IV Cont (01.02.19 @ 21:24) >  1.  1 cm left temporal superficial cavernoma. Consider the possibility   that the cavernoma bled into the adjacent sulcus, causing SAH    2.  Possible adjacent small developmental venous anomaly (correlation   with conventional angiogram December 31)    3.  No evidence for cavernous sinus thrombosis    4.  Thinning of the right lateral transverse sinus. This is in an area   where there is an arachnoid granulation. (Correlation with CT scan).   Consider altered flow dynamics or stenosis, less likely intraluminal   thrombus.    Last TCD:    Last EEG:    CVS:  ICU Vital Signs Last 24 Hrs  T(C): 35.4 (07 Jan 2019 08:00), Max: 37 (06 Jan 2019 16:00)  T(F): 95.8 (07 Jan 2019 08:00), Max: 98.6 (06 Jan 2019 16:00)  HR: 70 (07 Jan 2019 09:00) (62 - 92)  BP: 106/73 (07 Jan 2019 09:00) (99/54 - 151/87)  BP(mean): 85 (07 Jan 2019 09:00) (64 - 118)    < from: Transthoracic Echocardiogram (12.31.18 @ 07:46) >    Summary:   1. Left ventricular ejection fraction, by visual estimation, is 65 to   70%.   2. Normal global left ventricular systolic function.   3. Mild mitral regurgitation.   4. Mild tricuspid regurgitation.    EKG    Diagnosis Line Normal sinus rhythm  Cannot rule out Inferior infarct , age undetermined  Abnormal ECG      Respitory:    ABG: NA    < from: Xray Chest 1 View-PORTABLE IMMEDIATE (01.04.19 @ 11:25) >    IMPRESSION:     Decreased pulmonary vascular congestion.      Vent setting:NA      GI:  Prophalaxis: Protonix      LIVER FUNCTIONS - ( 04 Jan 2019 04:45 )  Alb: 3.3 g/dL / Pro: 5.5 g/dL / ALK PHOS: 76 U/L / ALT: 11 U/L / AST: 13 U/L / GGT: x           Renal:  Protein Total, Urine 24 Hour (01.04.19 @ 21:00)    Urine Creatinine Calculation: 1.8 g/24 hr    Total Volume - 24 Hour: 2800 mL    Protein Lupillo. Calculation: 784 mg/24 h    Interval Timed: 24 HR      I&O's Detail    06 Jan 2019 07:01  -  07 Jan 2019 07:00  --------------------------------------------------------  IN:    IV PiggyBack: 200 mL    niCARdipine Infusion: 470 mL    niCARdipine Infusion: 45 mL    Oral Fluid: 120 mL    sodium chloride 0.9%.: 1200 mL  Total IN: 2035 mL    OUT:    Voided: 600 mL  Total OUT: 600 mL    Total NET: 1435 mL      07 Jan 2019 07:01  -  07 Jan 2019 09:44  --------------------------------------------------------  IN:    sodium chloride 0.9%.: 50 mL  Total IN: 50 mL    OUT:    Voided: 200 mL  Total OUT: 200 mL    Total NET: -150 mL 33yFemale    Chief Complaint:  Sub Arachnoid Hemorrhage.    Acute Problems:  -Subarachnoid Hemorrhage  -HTN  -Prevent vasospasm.    History:  1 week post partum.    Yesterday's Plan:  -Discontinue Cardene.  -OOB to chair.  -CT perfusion.    Last 24 hour updates:  -Improved headache.  -Cardene discontinued.      Ancillary Management:  Chest PT[]  Head of bed >35 [x]  Out of bed to chair [x]  PT/OT/ST []  Spirometry[]  DVT prophalaxis[x]    Medications:  acetaminophen   Tablet .. 650 milliGRAM(s) Oral every 4 hours  cefTRIAXone   IVPB 1 Gram(s) IV Intermittent every 24 hours  cefTRIAXone   IVPB      chlorhexidine 4% Liquid 1 Application(s) Topical <User Schedule>  docusate sodium 100 milliGRAM(s) Oral three times a day  labetalol 200 milliGRAM(s) Oral every 6 hours  levETIRAcetam  IVPB 750 milliGRAM(s) IV Intermittent every 12 hours  magnesium oxide 400 milliGRAM(s) Oral three times a day with meals  multivitamin 1 Tablet(s) Oral daily  niCARdipine Infusion 5 mG/Hr IV Continuous <Continuous>  niMODipine 30 milliGRAM(s) Oral every 3 hours  pantoprazole    Tablet 40 milliGRAM(s) Oral before breakfast  senna 2 Tablet(s) Oral at bedtime  sodium chloride 0.9%. 1000 milliLiter(s) IV Continuous <Continuous>    Vitals  T(F): 95.8 (01-07-19 @ 08:00), Max: 98.6 (01-06-19 @ 16:00)  HR: 70 (01-07-19 @ 09:00) (62 - 92)  BP: 106/73 (01-07-19 @ 09:00) (99/54 - 151/87)  RR: 24 (01-07-19 @ 09:00) (13 - 33)  SpO2: 99% (01-07-19 @ 09:00) (96% - 100%)    Neuro Exam:  Awake []no[x]spontaneously[]occasionally[]to stimuli  AI [x]y[]n   Following commands:[x]y[]n  Oriented:[]0[]1[]2[x]3    Tracking:[x]y[]n  Language:intact  Time off sedation for exam:  Pupils:  Right  3>2       Left    3>2              Corneal:+       Gag reflex:+       EOMI:intact      EVD ICP:             Level(cm):          24hr(ml):                             CSF:   W:      R:     C:    P:     G:     LA:    LOC:   0   Questions: 0      Commands: 0   VF:    0   Gaze:  0   Facial:      0  RUE:  0 RLE:0    LUE:   0  LLE:0  Ataxia:            0  Sensory:0  Language:     0     Dysarthria:0  Extinction:0    NIHSS:0    GCS: 15      Hunt&Balbuena:2   m-Al: 2     Last CTH:  < from: CT Head No Cont (01.06.19 @ 20:18) >  IMPRESSION:  In comparison to the previous head CT 12/30/2018:    1.  No CT evidence of acute large territory infarct.    2.  The previously seen subarachnoid hemorrhage has nearly resolved.    3.  Unchanged 9 mm rounded lesion within the left frontotemporal region,   likely cavernoma on MRI.    Last CTP:    IMPRESSION:     1.  No evidence of major vascular stenosis or occlusion. No definite   perfusion abnormality demonstrated.    2.  Utilizing the lower threshold index for ischemia (Tmax >4s) there are   areas of relative ischemia in the white matter of bilateral frontal,   temporal and parietal lobes (total volume 62 mL). This is of uncertain   clinical significance. Given clinical concern for vasospasm, attention on   follow up imaging is recommended.    Last MRI:  < from: MR Head w/ IV Cont (01.02.19 @ 21:24) >  1.  1 cm left temporal superficial cavernoma. Consider the possibility   that the cavernoma bled into the adjacent sulcus, causing SAH    2.  Possible adjacent small developmental venous anomaly (correlation   with conventional angiogram December 31)    3.  No evidence for cavernous sinus thrombosis    4.  Thinning of the right lateral transverse sinus. This is in an area   where there is an arachnoid granulation. (Correlation with CT scan).   Consider altered flow dynamics or stenosis, less likely intraluminal   thrombus.    Last TCD:    Last EEG:    CVS:  ICU Vital Signs Last 24 Hrs  T(C): 35.4 (07 Jan 2019 08:00), Max: 37 (06 Jan 2019 16:00)  T(F): 95.8 (07 Jan 2019 08:00), Max: 98.6 (06 Jan 2019 16:00)  HR: 70 (07 Jan 2019 09:00) (62 - 92)  BP: 106/73 (07 Jan 2019 09:00) (99/54 - 151/87)  BP(mean): 85 (07 Jan 2019 09:00) (64 - 118)    < from: Transthoracic Echocardiogram (12.31.18 @ 07:46) >    Summary:   1. Left ventricular ejection fraction, by visual estimation, is 65 to   70%.   2. Normal global left ventricular systolic function.   3. Mild mitral regurgitation.   4. Mild tricuspid regurgitation.    EKG    Diagnosis Line Normal sinus rhythm  Cannot rule out Inferior infarct , age undetermined  Abnormal ECG      Respitory:    ABG: NA    < from: Xray Chest 1 View-PORTABLE IMMEDIATE (01.04.19 @ 11:25) >    IMPRESSION:     Decreased pulmonary vascular congestion.      Vent setting:NA      GI:  Prophalaxis: Protonix      LIVER FUNCTIONS - ( 04 Jan 2019 04:45 )  Alb: 3.3 g/dL / Pro: 5.5 g/dL / ALK PHOS: 76 U/L / ALT: 11 U/L / AST: 13 U/L / GGT: x           Renal:    Protein Total, Urine 24 Hour (01.04.19 @ 21:00)    Urine Creatinine Calculation: 1.8 g/24 hr    Total Volume - 24 Hour: 2800 mL    Protein Lupillo. Calculation: 784 mg/24 h    Interval Timed: 24 HR      I&O's Detail    06 Jan 2019 07:01  -  07 Jan 2019 07:00  --------------------------------------------------------  IN:    IV PiggyBack: 200 mL    niCARdipine Infusion: 470 mL    niCARdipine Infusion: 45 mL    Oral Fluid: 120 mL    sodium chloride 0.9%.: 1200 mL  Total IN: 2035 mL    OUT:    Voided: 600 mL  Total OUT: 600 mL    Total NET: 1435 mL      07 Jan 2019 07:01  -  07 Jan 2019 09:44  --------------------------------------------------------  IN:    sodium chloride 0.9%.: 50 mL  Total IN: 50 mL    OUT:    Voided: 200 mL  Total OUT: 200 mL    Total NET: -150 mL    ID  ICU Vital Signs Last 24 Hrs  T(C): 35.4 (07 Jan 2019 08:00), Max: 37 (06 Jan 2019 16:00)  T(F): 95.8 (07 Jan 2019 08:00), Max: 98.6 (06 Jan 2019 16:00)    Lactate, Blood: 1.4 mmol/L (01.04.19 @ 00:50)    Hematology:                13.0                 138  | 19   | 14           6.51  >-----------< 258     ------------------------< 88                    39.0                 4.0  | 102  | 0.5                                          Ca 8.8   Mg 1.9   Ph x 33yFemale    Chief Complaint:  Sub Arachnoid Hemorrhage.    Acute Problems:  Over the weekend:  -Subarachnoid Hemorrhage  -HTN  -Persistent headache  -Concern for vasospasm.    History:  1 week post partum.    Yesterday's Plan:  -Discontinue Cardene.  -OOB to chair.  -CT perfusion.    Last 24 hour updates:  -Over the weekend:  -Persistent headache.  -PO antihypertensives optimized.  -Cardene discontinued.      Ancillary Management:  Chest PT[]  Head of bed >35 [x]  Out of bed to chair [x]  PT/OT/ST []  Spirometry[]  DVT prophalaxis[x]    Medications:  acetaminophen   Tablet .. 650 milliGRAM(s) Oral every 4 hours  cefTRIAXone   IVPB 1 Gram(s) IV Intermittent every 24 hours  cefTRIAXone   IVPB      chlorhexidine 4% Liquid 1 Application(s) Topical <User Schedule>  docusate sodium 100 milliGRAM(s) Oral three times a day  labetalol 200 milliGRAM(s) Oral every 6 hours  levETIRAcetam  IVPB 750 milliGRAM(s) IV Intermittent every 12 hours  magnesium oxide 400 milliGRAM(s) Oral three times a day with meals  multivitamin 1 Tablet(s) Oral daily  niCARdipine Infusion 5 mG/Hr IV Continuous <Continuous>  niMODipine 30 milliGRAM(s) Oral every 3 hours  pantoprazole    Tablet 40 milliGRAM(s) Oral before breakfast  senna 2 Tablet(s) Oral at bedtime  sodium chloride 0.9%. 1000 milliLiter(s) IV Continuous <Continuous>    Vitals  T(F): 95.8 (01-07-19 @ 08:00), Max: 98.6 (01-06-19 @ 16:00)  HR: 70 (01-07-19 @ 09:00) (62 - 92)  BP: 106/73 (01-07-19 @ 09:00) (99/54 - 151/87)  RR: 24 (01-07-19 @ 09:00) (13 - 33)  SpO2: 99% (01-07-19 @ 09:00) (96% - 100%)    Neuro Exam:  Awake []no[x]spontaneously[]occasionally[]to stimuli  AI [x]y[]n   Following commands:[x]y[]n  Oriented:[]0[]1[]2[x]3    Tracking:[x]y[]n  Language:intact  Time off sedation for exam:  Pupils:  Right  3>2       Left    3>2              Corneal:+       Gag reflex:+       EOMI:intact      EVD ICP:             Level(cm):          24hr(ml):                             CSF:   W:      R:     C:    P:     G:     LA:    LOC:   0   Questions: 0      Commands: 0   VF:    0   Gaze:  0   Facial:      0  RUE:  0 RLE:0    LUE:   0  LLE:0  Ataxia:            0  Sensory:0  Language:     0     Dysarthria:0  Extinction:0    NIHSS:0    GCS: 15      Hunt&Balbuena:2   m-Al: 2     Last CTH:  < from: CT Head No Cont (01.06.19 @ 20:18) >  IMPRESSION:  In comparison to the previous head CT 12/30/2018:    1.  No CT evidence of acute large territory infarct.    2.  The previously seen subarachnoid hemorrhage has nearly resolved.    3.  Unchanged 9 mm rounded lesion within the left frontotemporal region,   likely cavernoma on MRI.    Last CTP:    IMPRESSION:     1.  No evidence of major vascular stenosis or occlusion. No definite   perfusion abnormality demonstrated.    2.  Utilizing the lower threshold index for ischemia (Tmax >4s) there are   areas of relative ischemia in the white matter of bilateral frontal,   temporal and parietal lobes (total volume 62 mL). This is of uncertain   clinical significance. Given clinical concern for vasospasm, attention on   follow up imaging is recommended.    Last MRI:  < from: MR Head w/ IV Cont (01.02.19 @ 21:24) >  1.  1 cm left temporal superficial cavernoma. Consider the possibility   that the cavernoma bled into the adjacent sulcus, causing SAH    2.  Possible adjacent small developmental venous anomaly (correlation   with conventional angiogram December 31)    3.  No evidence for cavernous sinus thrombosis    4.  Thinning of the right lateral transverse sinus. This is in an area   where there is an arachnoid granulation. (Correlation with CT scan).   Consider altered flow dynamics or stenosis, less likely intraluminal   thrombus.    Last TCD:    Last EEG:    CVS:  ICU Vital Signs Last 24 Hrs  T(C): 35.4 (07 Jan 2019 08:00), Max: 37 (06 Jan 2019 16:00)  T(F): 95.8 (07 Jan 2019 08:00), Max: 98.6 (06 Jan 2019 16:00)  HR: 70 (07 Jan 2019 09:00) (62 - 92)  BP: 106/73 (07 Jan 2019 09:00) (99/54 - 151/87)  BP(mean): 85 (07 Jan 2019 09:00) (64 - 118)    < from: Transthoracic Echocardiogram (12.31.18 @ 07:46) >    Summary:   1. Left ventricular ejection fraction, by visual estimation, is 65 to   70%.   2. Normal global left ventricular systolic function.   3. Mild mitral regurgitation.   4. Mild tricuspid regurgitation.    EKG    Diagnosis Line Normal sinus rhythm  Cannot rule out Inferior infarct , age undetermined  Abnormal ECG    CVP  MAP/CPP/SBP target: 100-140  CO:    CI:  Enzymes/Trop:    Respitory:    ABG: NA    < from: Xray Chest 1 View-PORTABLE IMMEDIATE (01.04.19 @ 11:25) >    IMPRESSION:     Decreased pulmonary vascular congestion.      Vent setting:NA      GI:  Prophalaxis: Protonix      LIVER FUNCTIONS - ( 04 Jan 2019 04:45 )  Alb: 3.3 g/dL / Pro: 5.5 g/dL / ALK PHOS: 76 U/L / ALT: 11 U/L / AST: 13 U/L / GGT: x           Renal:    Protein Total, Urine 24 Hour (01.04.19 @ 21:00)    Urine Creatinine Calculation: 1.8 g/24 hr    Total Volume - 24 Hour: 2800 mL    Protein Lupillo. Calculation: 784 mg/24 h    Interval Timed: 24 HR      I&O's Detail    06 Jan 2019 07:01  -  07 Jan 2019 07:00  --------------------------------------------------------  IN:    IV PiggyBack: 200 mL    niCARdipine Infusion: 470 mL    niCARdipine Infusion: 45 mL    Oral Fluid: 120 mL    sodium chloride 0.9%.: 1200 mL  Total IN: 2035 mL    OUT:    Voided: 600 mL  Total OUT: 600 mL    Total NET: 1435 mL      07 Jan 2019 07:01  -  07 Jan 2019 09:44  --------------------------------------------------------  IN:    sodium chloride 0.9%.: 50 mL  Total IN: 50 mL    OUT:    Voided: 200 mL  Total OUT: 200 mL    Total NET: -150 mL    ID  ICU Vital Signs Last 24 Hrs  T(C): 35.4 (07 Jan 2019 08:00), Max: 37 (06 Jan 2019 16:00)  T(F): 95.8 (07 Jan 2019 08:00), Max: 98.6 (06 Jan 2019 16:00)    Lactate, Blood: 1.4 mmol/L (01.04.19 @ 00:50)    Hematology:                13.0                 138  | 19   | 14           6.51  >-----------< 258     ------------------------< 88                    39.0                 4.0  | 102  | 0.5                                          Ca 8.8   Mg 1.9   Ph x        DVT Prophylaxis  Lovenox[]   Heparin[]   Venodynes[]   SCD's[x] 33yFemale    Chief Complaint:  Sub Arachnoid Hemorrhage.    Acute Problems:  -HTN  -Persistent headache  -Concern for vasospasm.    History:  1 week post partum.    Yesterday's Plan:  -Discontinue Cardene.  -OOB to chair.  -CT perfusion.    Last 24 hour updates:  -Over the weekend:  -Persistent headache.  -PO antihypertensives optimized.  -Cardene discontinued.      Ancillary Management:  Chest PT[]  Head of bed >35 [x]  Out of bed to chair [x]  PT/OT/ST []  Spirometry[]  DVT prophalaxis[x]    Medications:  acetaminophen   Tablet .. 650 milliGRAM(s) Oral every 4 hours  cefTRIAXone   IVPB 1 Gram(s) IV Intermittent every 24 hours  cefTRIAXone   IVPB      chlorhexidine 4% Liquid 1 Application(s) Topical <User Schedule>  docusate sodium 100 milliGRAM(s) Oral three times a day  labetalol 200 milliGRAM(s) Oral every 6 hours  levETIRAcetam  IVPB 750 milliGRAM(s) IV Intermittent every 12 hours  magnesium oxide 400 milliGRAM(s) Oral three times a day with meals  multivitamin 1 Tablet(s) Oral daily  niCARdipine Infusion 5 mG/Hr IV Continuous <Continuous>  niMODipine 30 milliGRAM(s) Oral every 3 hours  pantoprazole    Tablet 40 milliGRAM(s) Oral before breakfast  senna 2 Tablet(s) Oral at bedtime  sodium chloride 0.9%. 1000 milliLiter(s) IV Continuous <Continuous>    Vitals  T(F): 95.8 (01-07-19 @ 08:00), Max: 98.6 (01-06-19 @ 16:00)  HR: 70 (01-07-19 @ 09:00) (62 - 92)  BP: 106/73 (01-07-19 @ 09:00) (99/54 - 151/87)  RR: 24 (01-07-19 @ 09:00) (13 - 33)  SpO2: 99% (01-07-19 @ 09:00) (96% - 100%)    Neuro Exam:  Awake []no[x]spontaneously[]occasionally[]to stimuli  AI [x]y[]n   Following commands:[x]y[]n  Oriented:[]0[]1[]2[x]3    Tracking:[x]y[]n  Language:intact  Time off sedation for exam:  Pupils:  Right  3>2       Left    3>2              Corneal:+       Gag reflex:+       EOMI:intact      EVD ICP:             Level(cm):          24hr(ml):                             CSF:   W:      R:     C:    P:     G:     LA:    LOC:   0   Questions: 0      Commands: 0   VF:    0   Gaze:  0   Facial:      0  RUE:  0 RLE:0    LUE:   0  LLE:0  Ataxia:            0  Sensory:0  Language:     0     Dysarthria:0  Extinction:0    NIHSS:0    GCS: 15      Hunt&Balbuena:2   m-Al: 2     Last CTH:  < from: CT Head No Cont (01.06.19 @ 20:18) >  IMPRESSION:  In comparison to the previous head CT 12/30/2018:    1.  No CT evidence of acute large territory infarct.    2.  The previously seen subarachnoid hemorrhage has nearly resolved.    3.  Unchanged 9 mm rounded lesion within the left frontotemporal region,   likely cavernoma on MRI.    Last CTP:    IMPRESSION:     1.  No evidence of major vascular stenosis or occlusion. No definite   perfusion abnormality demonstrated.    2.  Utilizing the lower threshold index for ischemia (Tmax >4s) there are   areas of relative ischemia in the white matter of bilateral frontal,   temporal and parietal lobes (total volume 62 mL). This is of uncertain   clinical significance. Given clinical concern for vasospasm, attention on   follow up imaging is recommended.    Last MRI:  < from: MR Head w/ IV Cont (01.02.19 @ 21:24) >  1.  1 cm left temporal superficial cavernoma. Consider the possibility   that the cavernoma bled into the adjacent sulcus, causing SAH    2.  Possible adjacent small developmental venous anomaly (correlation   with conventional angiogram December 31)    3.  No evidence for cavernous sinus thrombosis    4.  Thinning of the right lateral transverse sinus. This is in an area   where there is an arachnoid granulation. (Correlation with CT scan).   Consider altered flow dynamics or stenosis, less likely intraluminal   thrombus.    Last TCD:    Last EEG:    CVS:  ICU Vital Signs Last 24 Hrs  T(C): 35.4 (07 Jan 2019 08:00), Max: 37 (06 Jan 2019 16:00)  T(F): 95.8 (07 Jan 2019 08:00), Max: 98.6 (06 Jan 2019 16:00)  HR: 70 (07 Jan 2019 09:00) (62 - 92)  BP: 106/73 (07 Jan 2019 09:00) (99/54 - 151/87)  BP(mean): 85 (07 Jan 2019 09:00) (64 - 118)    < from: Transthoracic Echocardiogram (12.31.18 @ 07:46) >    Summary:   1. Left ventricular ejection fraction, by visual estimation, is 65 to   70%.   2. Normal global left ventricular systolic function.   3. Mild mitral regurgitation.   4. Mild tricuspid regurgitation.    EKG    Diagnosis Line Normal sinus rhythm  Cannot rule out Inferior infarct , age undetermined  Abnormal ECG    CVP  MAP/CPP/SBP target: 100-140  CO:    CI:  Enzymes/Trop:    Respitory:    ABG: NA    < from: Xray Chest 1 View-PORTABLE IMMEDIATE (01.04.19 @ 11:25) >    IMPRESSION:     Decreased pulmonary vascular congestion.      Vent setting:NA      GI:  Prophalaxis: Protonix      LIVER FUNCTIONS - ( 04 Jan 2019 04:45 )  Alb: 3.3 g/dL / Pro: 5.5 g/dL / ALK PHOS: 76 U/L / ALT: 11 U/L / AST: 13 U/L / GGT: x           Renal:    Protein Total, Urine 24 Hour (01.04.19 @ 21:00)    Urine Creatinine Calculation: 1.8 g/24 hr    Total Volume - 24 Hour: 2800 mL    Protein Lupillo. Calculation: 784 mg/24 h    Interval Timed: 24 HR      I&O's Detail    06 Jan 2019 07:01  -  07 Jan 2019 07:00  --------------------------------------------------------  IN:    IV PiggyBack: 200 mL    niCARdipine Infusion: 470 mL    niCARdipine Infusion: 45 mL    Oral Fluid: 120 mL    sodium chloride 0.9%.: 1200 mL  Total IN: 2035 mL    OUT:    Voided: 600 mL  Total OUT: 600 mL    Total NET: 1435 mL      07 Jan 2019 07:01  -  07 Jan 2019 09:44  --------------------------------------------------------  IN:    sodium chloride 0.9%.: 50 mL  Total IN: 50 mL    OUT:    Voided: 200 mL  Total OUT: 200 mL    Total NET: -150 mL    ID  ICU Vital Signs Last 24 Hrs  T(C): 35.4 (07 Jan 2019 08:00), Max: 37 (06 Jan 2019 16:00)  T(F): 95.8 (07 Jan 2019 08:00), Max: 98.6 (06 Jan 2019 16:00)    Lactate, Blood: 1.4 mmol/L (01.04.19 @ 00:50)    Hematology:                13.0                 138  | 102   | 14           6.51  >-----------< 258     ------------------------< 88                    39.0                 4.0  | 19  | 0.5                                          Ca 8.8   Mg 1.9   Ph x        DVT Prophylaxis  Lovenox[]   Heparin[]   Venodynes[]   SCD's[x]

## 2019-01-07 NOTE — PROGRESS NOTE ADULT - SUBJECTIVE AND OBJECTIVE BOX
Neurology Follow up note      Name  BALDEV KEE    HPI:  32 y/o female  who presents 2 days post partum with hx of gestational hypertension, PCOS and asthma transferred from [facility] for management of SAH. As per patient she developed severe headache since 3:30pm on day of presentation. Headache started while patient laying down and described as sudden in onset , diffuse , 8/10 in intensity and progressively worsening since afternoon a/w photophobia , nausea but no vomiting. Headache worsens with head movements. CTH at [facility] showed SAH for which the neurosurgeon at [facility] Dr. Calderon discussed case with dr fontanez and patient was transferred to [facility] for possible intervention like coiling in am .   No history of similar events in past    VS upon admission to MICU bp 120/66 , hr 86, tmax 98.7 (31 Dec 2018 02:15)      Interval History - HA relieved withTylenol; CTA no stenosis or vascualr abnormality seen on prelim read . SAH resolving ON HCT +cav/mal left sylvian fissure..          Vital Signs Last 24 Hrs  T(C): 36.2 (2019 00:00), Max: 37.2 (2019 08:00)  T(F): 97.2 (2019 00:00), Max: 98.9 (2019 08:00)  HR: 72 (2019 01:30) (60 - 92)  BP: 100/54 (2019 01:15) (100/54 - 151/87)  BP(mean): 64 (2019 01:15) (64 - 118)  RR: 20 (2019 01:30) (15 - 33)  SpO2: 98% (2019 01:30) (96% - 100%)          Neurological Exam:   Mental status: Awake, alert and oriented x3.    PERRLA EOMI Tongue midline  Speech intact   20->1 in less than 30secs/Dec-> with mistake at April but rectify it  ZARCO X 4 No drift  Sensory: grossly intact          Medications  acetaminophen   Tablet .. 650 milliGRAM(s) Oral every 4 hours  cefTRIAXone   IVPB 1 Gram(s) IV Intermittent every 24 hours  cefTRIAXone   IVPB      chlorhexidine 4% Liquid 1 Application(s) Topical <User Schedule>  docusate sodium 100 milliGRAM(s) Oral three times a day  labetalol 200 milliGRAM(s) Oral every 6 hours  levETIRAcetam  IVPB 750 milliGRAM(s) IV Intermittent every 12 hours  magnesium oxide 400 milliGRAM(s) Oral three times a day with meals  multivitamin 1 Tablet(s) Oral daily  niCARdipine Infusion 5 mG/Hr IV Continuous <Continuous>  niMODipine 30 milliGRAM(s) Oral every 3 hours  ondansetron Injectable 4 milliGRAM(s) IV Push every 4 hours PRN  pantoprazole    Tablet 40 milliGRAM(s) Oral before breakfast  senna 2 Tablet(s) Oral at bedtime  sodium chloride 0.9%. 1000 milliLiter(s) IV Continuous <Continuous>  traMADol 50 milliGRAM(s) Oral every 6 hours PRN      Lab      Radiology

## 2019-01-07 NOTE — PROGRESS NOTE ADULT - SUBJECTIVE AND OBJECTIVE BOX
________________________________________________________________________________  DAILY PROGRESS NOTE:    ================== SUBJECTIVE ==================    BALDEV KEE  /   33y  /  Female  /  MRN#: [id number]  Patient is a 33y old Female who presents with a chief complaint of SAH , transfer from [facility] (2019 10:43)  Currently admitted to medicine with the primary diagnosis of     HOSPITAL DAY: 8d     ICU DAY: 8d    SUMMARY OF HOSPITAL COURSE TO DATE     32 y/o female  w/ PMH of Gestational HTN, PCOS and asthma transferred from [facility] for management of SAH. As per patient she developed severe headache since 3:30pm on day of presentation. Headache started while patient laying down and described as sudden in onset , diffuse , 8/10 in intensity and progressively worsening since afternoon a/w photophobia , nausea but no vomiting. Headache worsens with head movements. CTH at [facility] showed SAH for which the neurosurgeon at [facility] Dr. Calderon discussed case with dr fontanez and patient was transferred to [facility] for possible intervention.     At [facility], pt underwent cerebral angiogram. Pt underwent MRV after and was found to have ?venous sinus thrombosis on MRV. As per neurology, it can be secondary to artifact. Neurology wanted a repeat MRI with IV contrast.     ICU day 3, pt still has significant headache which improved compared to admission. Pt is currently awaiting for MRI with IV contrast.     ICU Day 4 (1/3), pt's headache improved. Pt underwent MRI w/ contrast yesterday. Spoke with neurology team, concerned for preclampsia post partum and required ob consult. Ob was consulted and doubted eclampsia given risk highest in the first 24hr postpartum. Pt continued to be on Mg and Nicardipine drip as per neuro to maintain Mg of 2-4 and SBP < 120. Pt was started Tylenol standing q4h to prevent headache. She also spiked a fever of 101 yesterday afternoon. Panculture taken and needed f/u.     ICU Day 5 (), pt's headache resolved. Neurology recommended to downgrade pt to stroke unit. Nicardipine to be d/c and switched to labetalol. Pt started Rocephin for UA positive.     ICU Day 7 (), pt again became hypertensive and was restarted on cardene labetalol was increased. As per neuro, would like to keep patient in ICU for monitoring. Neuro is concerning for vasospasm. CTH and CT perfusion of head were ordered and performed. Pt is NPO for possible angiogram tomorrow.     ICU Day 8 (), pt is off cardene drip and SBP is <140. Angiogram is pending at this time.    OVERNIGHT EVENTS: none    TODAY: Patient was seen this morning at bedside. Currently, the patient reports no headache s/p Toradol     Review of systems is otherwise negative.    =================== OBJECTIVE ===================    VITAL SIGNS: Last 24 Hours  T(C): 35.4 (2019 08:00), Max: 37 (2019 16:00)  T(F): 95.8 (2019 08:00), Max: 98.6 (2019 16:00)  HR: 64 (2019 11:45) (58 - 92)  BP: 137/92 (2019 11:45) (99/54 - 148/89)  BP(mean): 105 (2019 11:45) (64 - 116)  RR: 17 (2019 11:45) (12 - 33)  SpO2: 99% (2019 11:45) (96% - 100%)    19 @ 07:  -  19 @ 07:00  --------------------------------------------------------  IN: 2035 mL / OUT: 600 mL / NET: 1435 mL    19 @ 07:01  -  19 @ 13:03  --------------------------------------------------------  IN: 200 mL / OUT: 350 mL / NET: -150 mL      PHYSICAL EXAM:  GEN: NAD, comfortable  LUNGS: CTAB, no w/r/r  HEART: RRR, no m/r/g  ABD: soft, NT/ND, +BS  EXT: no edema, PP b/l  NEURO: AAOx3, non-focal neurological exam    ===================== LABS =====================                        13.0   6.51  )-----------( 258      ( 2019 05:37 )             39.0     01-07    138  |  102  |  14  ----------------------------<  88  4.0   |  19  |  0.5<L>    Ca    8.8      2019 05:37  Mg     1.9     01-07      ================= MICROBIOLOGY ================    ================== IMAGING TO DATE ==================  < from: CT Head No Cont (19 @ 20:18) >  IMPRESSION:  In comparison to the previous head CT 2018:  1.  No CT evidence of acute large territory infarct.  2.  The previously seen subarachnoid hemorrhage has nearly resolved.  3.  Unchanged 9 mm rounded lesion within the left frontotemporal region,   likely cavernoma on MRI.  < end of copied text >    < from: CT Perfusion w/ Maps w/ IV Cont (19 @ 20:25) >  IMPRESSION:   1.  No evidence of major vascular stenosis or occlusion. No definite   perfusion abnormality demonstrated.  2.  Utilizing the lower threshold index for ischemia (Tmax >4s) there are   areas of relative ischemia in the white matter of bilateral frontal,   temporal and parietal lobes (total volume 62 mL). This is of uncertain   clinical significance. Given clinical concern for vasospasm, attention on   follow up imaging is recommended.  < end of copied text >      ================== OTHER SIGNIFICANT FINDINGS ==================    ================== ALLERGIES ===================  Alcohol (Flushing)  Tamiflu (Unknown)    ==================== MEDS =====================  acetaminophen   Tablet .. 650 milliGRAM(s) Oral every 4 hours  cefTRIAXone   IVPB 1 Gram(s) IV Intermittent every 24 hours  cefTRIAXone   IVPB      chlorhexidine 4% Liquid 1 Application(s) Topical <User Schedule>  docusate sodium 100 milliGRAM(s) Oral three times a day  labetalol 200 milliGRAM(s) Oral every 6 hours  levETIRAcetam  IVPB 750 milliGRAM(s) IV Intermittent every 12 hours  magnesium oxide 400 milliGRAM(s) Oral three times a day with meals  multivitamin 1 Tablet(s) Oral daily  niCARdipine Infusion 5 mG/Hr IV Continuous <Continuous>  niMODipine 30 milliGRAM(s) Oral every 3 hours  pantoprazole    Tablet 40 milliGRAM(s) Oral before breakfast  senna 2 Tablet(s) Oral at bedtime  sodium chloride 0.9%. 1000 milliLiter(s) IV Continuous <Continuous>    PRN MEDICATIONS  ketorolac   Injectable 10 milliGRAM(s) IV Push every 8 hours PRN  ondansetron Injectable 4 milliGRAM(s) IV Push every 4 hours PRN  traMADol 50 milliGRAM(s) Oral every 6 hours PRN      ================= CONSULTS ==================  Neuro ()  Plan: keep NPO        Tylenol PRN for HA  keep SBp below 140  Cont current care

## 2019-01-07 NOTE — PROGRESS NOTE ADULT - ASSESSMENT
IMP: 33 y/o F with resolving SAH and _ left temporal cav mal    Plan: keep NPO        Tylenol PRN for HA  keep SBp below 140  Cont current care

## 2019-01-07 NOTE — PROGRESS NOTE ADULT - SUBJECTIVE AND OBJECTIVE BOX
S/Overnight events:  NPO for possible procedure today      Hospital Course:   34 y/o female with resolving SAH, possible left caveroma      Vital Signs Last 24 Hrs  T(C): 35.4 (07 Jan 2019 08:00), Max: 37 (06 Jan 2019 16:00)  T(F): 95.8 (07 Jan 2019 08:00), Max: 98.6 (06 Jan 2019 16:00)  HR: 70 (07 Jan 2019 09:00) (62 - 92)  BP: 106/73 (07 Jan 2019 09:00) (99/54 - 151/87)  BP(mean): 85 (07 Jan 2019 09:00) (64 - 118)  RR: 24 (07 Jan 2019 09:00) (13 - 33)  SpO2: 99% (07 Jan 2019 09:00) (96% - 100%)    I&O's Detail    06 Jan 2019 07:01  -  07 Jan 2019 07:00  --------------------------------------------------------  IN:    IV PiggyBack: 200 mL    niCARdipine Infusion: 470 mL    niCARdipine Infusion: 45 mL    Oral Fluid: 120 mL    sodium chloride 0.9%.: 1200 mL  Total IN: 2035 mL    OUT:    Voided: 600 mL  Total OUT: 600 mL    Total NET: 1435 mL      07 Jan 2019 07:01  -  07 Jan 2019 09:22  --------------------------------------------------------  IN:    sodium chloride 0.9%.: 50 mL  Total IN: 50 mL    OUT:    Voided: 200 mL  Total OUT: 200 mL    Total NET: -150 mL        I&O's Summary    06 Jan 2019 07:01  -  07 Jan 2019 07:00  --------------------------------------------------------  IN: 2035 mL / OUT: 600 mL / NET: 1435 mL    07 Jan 2019 07:01  -  07 Jan 2019 09:22  --------------------------------------------------------  IN: 50 mL / OUT: 200 mL / NET: -150 mL        PHYSICAL EXAM:  Neurological: Patient seen with Dr Holder, awake, alert x3, NAD, has H/A today, none over the weekend, ZARCO well with good coordination, verbalizing well. Stable exam    LABS             13.0   6.51  )-----------( 258      ( 07 Jan 2019 05:37 )             39.0     01-07    138  |  102  |  14  ----------------------------<  88  4.0   |  19  |  0.5<L>    Ca    8.8      07 Jan 2019 05:37  Mg     1.9     01-07    Allergies    Alcohol (Flushing)  Tamiflu (Unknown)        MEDICATIONS:  Antibiotics:  cefTRIAXone   IVPB 1 Gram(s) IV Intermittent every 24 hours  cefTRIAXone   IVPB        Neuro:  acetaminophen   Tablet .. 650 milliGRAM(s) Oral every 4 hours  ketorolac   Injectable 10 milliGRAM(s) IV Push every 8 hours PRN  levETIRAcetam  IVPB 750 milliGRAM(s) IV Intermittent every 12 hours  ondansetron Injectable 4 milliGRAM(s) IV Push every 4 hours PRN  traMADol 50 milliGRAM(s) Oral every 6 hours PRN    Anticoagulation:    OTHER:  chlorhexidine 4% Liquid 1 Application(s) Topical <User Schedule>  docusate sodium 100 milliGRAM(s) Oral three times a day  labetalol 200 milliGRAM(s) Oral every 6 hours  niCARdipine Infusion 5 mG/Hr IV Continuous <Continuous>  niMODipine 30 milliGRAM(s) Oral every 3 hours  pantoprazole    Tablet 40 milliGRAM(s) Oral before breakfast  senna 2 Tablet(s) Oral at bedtime    IVF:  magnesium oxide 400 milliGRAM(s) Oral three times a day with meals  multivitamin 1 Tablet(s) Oral daily  sodium chloride 0.9%. 1000 milliLiter(s) IV Continuous <Continuous>    CULTURES:  Culture Results:   No growth to date. (01-03 @ 11:16)  Culture Results:   Culture grew 3 or more types of organisms which indicate  collection contamination; consider recollection only if clinically  indicated. (01-03 @ 09:45)    RADIOLOGY & ADDITIONAL TESTS:       < from: CT Head No Cont (01.06.19 @ 20:18) >  IMPRESSION:  In comparison to the previous head CT 12/30/2018:    1.  No CT evidence of acute large territory infarct.    2.  The previously seen subarachnoid hemorrhage has nearly resolved.    3.  Unchanged 9 mm rounded lesion within the left frontotemporal region,   likely cavernoma on MRI.    < end of copied text >  ASSESSMENT:  33y Female resolving SAH, possible left cavernoma    SAH  ^SAH  Family history of hypertension (Mother)  Family history of high cholesterol (Father)  Handoff  Gestational hypertension  Asthma  PCOS (polycystic ovarian syndrome)      PLAN: as per Dr Rider

## 2019-01-07 NOTE — PROGRESS NOTE ADULT - SUBJECTIVE AND OBJECTIVE BOX
Patient is a 33y old  Female who presents with a chief complaint of SAH , transfer from [facility] (07 Jan 2019 09:44)        Over Night Events:    Off watson drip        ROS:  See HPI    PHYSICAL EXAM    ICU Vital Signs Last 24 Hrs  T(C): 35.4 (07 Jan 2019 08:00), Max: 37 (06 Jan 2019 16:00)  T(F): 95.8 (07 Jan 2019 08:00), Max: 98.6 (06 Jan 2019 16:00)  HR: 70 (07 Jan 2019 09:00) (62 - 92)  BP: 106/73 (07 Jan 2019 09:00) (99/54 - 146/82)  BP(mean): 85 (07 Jan 2019 09:00) (64 - 114)  ABP: --  ABP(mean): --  RR: 24 (07 Jan 2019 09:00) (13 - 33)  SpO2: 99% (07 Jan 2019 09:00) (96% - 100%)      General: NAD  HEENT: GISSELLE             Lymphatic system: No cervical LN   Lungs: Bilateral BS  Cardiovascular: Regular   Gastrointestinal: Soft, Positive BS  Extremities: No clubbing.  Moves extremities.  Full Range of motion   Skin: Warm, intact  Neurological: No motor or sensory deficit.       01-06-19 @ 07:01  -  01-07-19 @ 07:00  --------------------------------------------------------  IN:    IV PiggyBack: 200 mL    niCARdipine Infusion: 470 mL    niCARdipine Infusion: 45 mL    Oral Fluid: 120 mL    sodium chloride 0.9%.: 1200 mL  Total IN: 2035 mL    OUT:    Voided: 600 mL  Total OUT: 600 mL    Total NET: 1435 mL      01-07-19 @ 07:01  -  01-07-19 @ 10:43  --------------------------------------------------------  IN:    sodium chloride 0.9%.: 50 mL  Total IN: 50 mL    OUT:    Voided: 200 mL  Total OUT: 200 mL    Total NET: -150 mL          LABS:                            13.0   6.51  )-----------( 258      ( 07 Jan 2019 05:37 )             39.0                                               01-07    138  |  102  |  14  ----------------------------<  88  4.0   |  19  |  0.5<L>    Ca    8.8      07 Jan 2019 05:37  Mg     1.9     01-07                                                                                                                                                                                                                           MEDICATIONS  (STANDING):  acetaminophen   Tablet .. 650 milliGRAM(s) Oral every 4 hours  cefTRIAXone   IVPB 1 Gram(s) IV Intermittent every 24 hours  cefTRIAXone   IVPB      chlorhexidine 4% Liquid 1 Application(s) Topical <User Schedule>  docusate sodium 100 milliGRAM(s) Oral three times a day  labetalol 200 milliGRAM(s) Oral every 6 hours  levETIRAcetam  IVPB 750 milliGRAM(s) IV Intermittent every 12 hours  magnesium oxide 400 milliGRAM(s) Oral three times a day with meals  multivitamin 1 Tablet(s) Oral daily  niCARdipine Infusion 5 mG/Hr (25 mL/Hr) IV Continuous <Continuous>  niMODipine 30 milliGRAM(s) Oral every 3 hours  pantoprazole    Tablet 40 milliGRAM(s) Oral before breakfast  senna 2 Tablet(s) Oral at bedtime  sodium chloride 0.9%. 1000 milliLiter(s) (50 mL/Hr) IV Continuous <Continuous>    MEDICATIONS  (PRN):  ketorolac   Injectable 10 milliGRAM(s) IV Push every 8 hours PRN Moderate Pain (4 - 6)  ondansetron Injectable 4 milliGRAM(s) IV Push every 4 hours PRN Nausea and/or Vomiting  traMADol 50 milliGRAM(s) Oral every 6 hours PRN Severe Pain (7 - 10)

## 2019-01-07 NOTE — PROGRESS NOTE ADULT - ASSESSMENT
Impression:  33 year old woman  presented 2 days post partum with a history of gestational hypertension, PCOS and asthma transferred from [facility] for management of SAH. As per patient she developed severe headache on day of presentation. CTH at [facility] showed SAH and was transferred to [facility]. 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 OB/GYN believes the hypertension is not due to pre-eclampsia, or eclampsia. She was on a Cardene drip for blood pressure management, she was eventually transitioned to oral hypertensives.  Over the weekend her headache worsened, CTH revealed improved SAH and CT perfusion failed to reveal perfusion abnormality. She was given Toradol for headache management. Today her exam remains non focal, she fails to report headache.    Suggestions:  Increase Labetolol to 300 mg PO q6 hours.  Increase the frequency of Nimotipine to 30 mg q2h.  May use hydralazine 10 mg IV PRN for SBP >140.  Keep -140.  May make tylenol PRN.  Continue Magnesium oxide 400 mg q8 hours.  Keep Magnesium level >2.   Continue NS IVF and increase to 100 ml per hour.  Continue Keppra.  Seizure precautions.  At this time diagnostic cerebral angiogram is not warranted.  Please make sure patient has Sequential DVT prophalaxis on.  Bilateral Lower Extremity Duplex for bilateral lower leg pain, rule out DVT.  Ambulate 3 times a day with supervision.  Please Call Code Stroke if worsening of neurological symptoms.  Continue ICU.      Robbie Burris NP  [id number] Impression:  33 year old woman  presented 2 days post partum with a history of gestational hypertension, PCOS and asthma transferred from [facility] for management of SAH. As per patient she developed severe headache on day of presentation. CTH at [facility] showed SAH and was transferred to [facility]. 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 OB/GYN believes the hypertension is not due to pre-eclampsia, or eclampsia. She was on a Cardene drip for blood pressure management, she was eventually transitioned to oral hypertensives.  Over the weekend her headache worsened, CTH revealed improved SAH and CT perfusion failed to reveal perfusion abnormality. She was given Toradol for headache management which was helpful, per patient. Today her exam remains non focal, she feels significant improvement in compare to the first day of admission here. She fails to report headache today.    Suggestions:  Increase Labetolol to 300 mg PO q6 hours.  Increase the frequency of Nimotipine to 30 mg q2h.  May use hydralazine 10 mg IV PRN q6h for SBP >140.  Keep -140.  May make tylenol 650mg q6h PRN for headache.  Continue Magnesium oxide 400 mg q8 hours.  Keep Magnesium level >2.   Continue NS IVF and increase to 100 ml per hour.  Continue Keppra.  Seizure precautions.  At this time diagnostic cerebral angiogram is not warranted.  Please make sure patient has Sequential DVT prophalaxis on.  Bilateral Lower Extremity Duplex for bilateral lower leg pain, rule out DVT.  Ambulate 3 times a day with supervision.  Please Call Code Stroke if worsening of neurological symptoms.

## 2019-01-07 NOTE — PROGRESS NOTE ADULT - ASSESSMENT
IMPRESSION:    SAH s/p angio  Venous sinus thrombosis ruled out  Hypertension      PLAN:    CNS: keppra,,nimodipine, interventional neuro f/u. F/U MRI results.    HEENT: Oral care    PULMONARY:  HOB @ 45 degrees, keep spo2 more than 92%    CARDIOVASCULAR: SBP less than 140mmhg. DC watson drip, switch to labetalol po, and can add hydralazine to control BP.    GI: GI prophylaxis.  Feeding NPO for now for possible intervetnion    RENAL:  Follow up lytes.  Correct as needed.    INFECTIOUS DISEASE: finish abx course.    HEMATOLOGICAL:  DVT prophylaxis: mechanical DVT prophylaxis.     ENDOCRINE:  Follow up FS.  Insulin protocol if needed.    MUSCULOSKELETAL: out of bed     Interventional neurology follow up.

## 2019-01-07 NOTE — PROGRESS NOTE ADULT - ASSESSMENT
34 yo F w/ PMH of gestational HTN, asthma and PCOS transferred from [facility] for management of SAH with CTH showing small SAH in left frontotemporal region.    # Fever r/o infection - resolved  - urine culture contaminated, blood culture no growth  - Rocephin 1g q24h (started 1/4) for 5d, last day on 1/6  - Tylenol around the clock     #SAH s/p diagnostic cerebral angiogram   - CTA head and neck (12/30): negative for any large vessel occlusion or significant stenosis  - MRV (1/1): concerning for venous sinus thrombosis  - MR Head (1/2): left temproal superficial cavernoma  - CT perfusion (1/6): areas of relative ischemia, unknown clinical significance   - CT head (1/6): subarachnoid hemorrhage nearly resolved   - c/w labetolol 200 mg PO q6h  - wean off cardene drip  - c/w nicardipine 10 mg PO q8h    - target SBP goal < 140  - c/w nimodipine 30 mg q3 and NS @ 50 to prevent vasospasm  - c/w keppra 750 mg q12  - Mg 400 mg PO q8h  - c/w neuro checks q4h   - f/u BMP and Mg    # Hypertension, uncontrolled, r/o secondary cause  - less likely to be eclampsia   - ruled out polycystic kidney disease, order US kidney  - c/w labetalol, procardia  - wean off cardene drip  - target SBP to <140    # Induced hypermagnesemia   - change to Mg 400 mg PO q8h   - f/u BMP  - maintains Mg between 2 - 4    # Intractable headache / nausea secondary to SAH  - zofran PRN for nausea   - tylenol standing for headache  - tramadol PRN for severe pain    Diet: DASH/TLC   GI ppx: ( protonix )  DVT ppx: ( SCD )  Activity: ambulate as tolerated  Code status: Full Code ( X ) / DNR (  ) / DNI (  )  Disposition: from home

## 2019-01-08 LAB
ANION GAP SERPL CALC-SCNC: 18 MMOL/L — HIGH (ref 7–14)
BASOPHILS # BLD AUTO: 0.06 K/UL — SIGNIFICANT CHANGE UP (ref 0–0.2)
BASOPHILS NFR BLD AUTO: 0.8 % — SIGNIFICANT CHANGE UP (ref 0–1)
BUN SERPL-MCNC: 22 MG/DL — HIGH (ref 10–20)
CALCIUM SERPL-MCNC: 8.8 MG/DL — SIGNIFICANT CHANGE UP (ref 8.5–10.1)
CHLORIDE SERPL-SCNC: 106 MMOL/L — SIGNIFICANT CHANGE UP (ref 98–110)
CO2 SERPL-SCNC: 18 MMOL/L — SIGNIFICANT CHANGE UP (ref 17–32)
CREAT SERPL-MCNC: 0.6 MG/DL — LOW (ref 0.7–1.5)
CULTURE RESULTS: SIGNIFICANT CHANGE UP
EOSINOPHIL # BLD AUTO: 0.48 K/UL — SIGNIFICANT CHANGE UP (ref 0–0.7)
EOSINOPHIL NFR BLD AUTO: 6.5 % — SIGNIFICANT CHANGE UP (ref 0–8)
GLUCOSE SERPL-MCNC: 83 MG/DL — SIGNIFICANT CHANGE UP (ref 70–99)
HCT VFR BLD CALC: 39.6 % — SIGNIFICANT CHANGE UP (ref 37–47)
HGB BLD-MCNC: 13 G/DL — SIGNIFICANT CHANGE UP (ref 12–16)
IMM GRANULOCYTES NFR BLD AUTO: 0.7 % — HIGH (ref 0.1–0.3)
LYMPHOCYTES # BLD AUTO: 1.17 K/UL — LOW (ref 1.2–3.4)
LYMPHOCYTES # BLD AUTO: 16 % — LOW (ref 20.5–51.1)
MAGNESIUM SERPL-MCNC: 2 MG/DL — SIGNIFICANT CHANGE UP (ref 1.8–2.4)
MCHC RBC-ENTMCNC: 28.2 PG — SIGNIFICANT CHANGE UP (ref 27–31)
MCHC RBC-ENTMCNC: 32.8 G/DL — SIGNIFICANT CHANGE UP (ref 32–37)
MCV RBC AUTO: 85.9 FL — SIGNIFICANT CHANGE UP (ref 81–99)
MONOCYTES # BLD AUTO: 0.61 K/UL — HIGH (ref 0.1–0.6)
MONOCYTES NFR BLD AUTO: 8.3 % — SIGNIFICANT CHANGE UP (ref 1.7–9.3)
NEUTROPHILS # BLD AUTO: 4.96 K/UL — SIGNIFICANT CHANGE UP (ref 1.4–6.5)
NEUTROPHILS NFR BLD AUTO: 67.7 % — SIGNIFICANT CHANGE UP (ref 42.2–75.2)
NRBC # BLD: 0 /100 WBCS — SIGNIFICANT CHANGE UP (ref 0–0)
PLATELET # BLD AUTO: 255 K/UL — SIGNIFICANT CHANGE UP (ref 130–400)
POTASSIUM SERPL-MCNC: 4.4 MMOL/L — SIGNIFICANT CHANGE UP (ref 3.5–5)
POTASSIUM SERPL-SCNC: 4.4 MMOL/L — SIGNIFICANT CHANGE UP (ref 3.5–5)
RBC # BLD: 4.61 M/UL — SIGNIFICANT CHANGE UP (ref 4.2–5.4)
RBC # FLD: 12.7 % — SIGNIFICANT CHANGE UP (ref 11.5–14.5)
SODIUM SERPL-SCNC: 142 MMOL/L — SIGNIFICANT CHANGE UP (ref 135–146)
SPECIMEN SOURCE: SIGNIFICANT CHANGE UP
WBC # BLD: 7.33 K/UL — SIGNIFICANT CHANGE UP (ref 4.8–10.8)
WBC # FLD AUTO: 7.33 K/UL — SIGNIFICANT CHANGE UP (ref 4.8–10.8)

## 2019-01-08 PROCEDURE — 99231 SBSQ HOSP IP/OBS SF/LOW 25: CPT

## 2019-01-08 RX ORDER — IBUPROFEN 200 MG
600 TABLET ORAL EVERY 6 HOURS
Qty: 0 | Refills: 0 | Status: DISCONTINUED | OUTPATIENT
Start: 2019-01-08 | End: 2019-01-10

## 2019-01-08 RX ORDER — LIDOCAINE 4 G/100G
1 CREAM TOPICAL DAILY
Qty: 0 | Refills: 0 | Status: COMPLETED | OUTPATIENT
Start: 2019-01-08 | End: 2019-01-10

## 2019-01-08 RX ADMIN — Medication 300 MILLIGRAM(S): at 05:22

## 2019-01-08 RX ADMIN — Medication 600 MILLIGRAM(S): at 21:56

## 2019-01-08 RX ADMIN — NIMODIPINE 30 MILLIGRAM(S): 60 SOLUTION ORAL at 21:52

## 2019-01-08 RX ADMIN — LIDOCAINE 1 PATCH: 4 CREAM TOPICAL at 16:00

## 2019-01-08 RX ADMIN — NIMODIPINE 30 MILLIGRAM(S): 60 SOLUTION ORAL at 16:00

## 2019-01-08 RX ADMIN — LEVETIRACETAM 400 MILLIGRAM(S): 250 TABLET, FILM COATED ORAL at 18:06

## 2019-01-08 RX ADMIN — Medication 10 MILLIGRAM(S): at 07:27

## 2019-01-08 RX ADMIN — PANTOPRAZOLE SODIUM 40 MILLIGRAM(S): 20 TABLET, DELAYED RELEASE ORAL at 06:44

## 2019-01-08 RX ADMIN — MAGNESIUM OXIDE 400 MG ORAL TABLET 400 MILLIGRAM(S): 241.3 TABLET ORAL at 16:17

## 2019-01-08 RX ADMIN — LEVETIRACETAM 400 MILLIGRAM(S): 250 TABLET, FILM COATED ORAL at 05:22

## 2019-01-08 RX ADMIN — Medication 650 MILLIGRAM(S): at 12:30

## 2019-01-08 RX ADMIN — NIMODIPINE 30 MILLIGRAM(S): 60 SOLUTION ORAL at 11:40

## 2019-01-08 RX ADMIN — Medication 650 MILLIGRAM(S): at 00:15

## 2019-01-08 RX ADMIN — MAGNESIUM OXIDE 400 MG ORAL TABLET 400 MILLIGRAM(S): 241.3 TABLET ORAL at 07:50

## 2019-01-08 RX ADMIN — Medication 1 TABLET(S): at 11:39

## 2019-01-08 RX ADMIN — Medication 10 MILLIGRAM(S): at 05:24

## 2019-01-08 RX ADMIN — NIMODIPINE 30 MILLIGRAM(S): 60 SOLUTION ORAL at 13:49

## 2019-01-08 RX ADMIN — NIMODIPINE 30 MILLIGRAM(S): 60 SOLUTION ORAL at 05:22

## 2019-01-08 RX ADMIN — NIMODIPINE 30 MILLIGRAM(S): 60 SOLUTION ORAL at 04:18

## 2019-01-08 RX ADMIN — NIMODIPINE 30 MILLIGRAM(S): 60 SOLUTION ORAL at 18:06

## 2019-01-08 RX ADMIN — NIMODIPINE 30 MILLIGRAM(S): 60 SOLUTION ORAL at 09:44

## 2019-01-08 RX ADMIN — NIMODIPINE 30 MILLIGRAM(S): 60 SOLUTION ORAL at 21:51

## 2019-01-08 RX ADMIN — Medication 650 MILLIGRAM(S): at 18:15

## 2019-01-08 RX ADMIN — Medication 600 MILLIGRAM(S): at 16:02

## 2019-01-08 RX ADMIN — Medication 600 MILLIGRAM(S): at 16:32

## 2019-01-08 RX ADMIN — Medication 100 MILLIGRAM(S): at 21:51

## 2019-01-08 RX ADMIN — CEFTRIAXONE 100 GRAM(S): 500 INJECTION, POWDER, FOR SOLUTION INTRAMUSCULAR; INTRAVENOUS at 09:44

## 2019-01-08 RX ADMIN — SENNA PLUS 2 TABLET(S): 8.6 TABLET ORAL at 21:51

## 2019-01-08 RX ADMIN — NIMODIPINE 30 MILLIGRAM(S): 60 SOLUTION ORAL at 02:13

## 2019-01-08 RX ADMIN — MAGNESIUM OXIDE 400 MG ORAL TABLET 400 MILLIGRAM(S): 241.3 TABLET ORAL at 11:39

## 2019-01-08 RX ADMIN — Medication 300 MILLIGRAM(S): at 11:40

## 2019-01-08 RX ADMIN — Medication 10 MILLIGRAM(S): at 17:18

## 2019-01-08 RX ADMIN — NIMODIPINE 30 MILLIGRAM(S): 60 SOLUTION ORAL at 07:49

## 2019-01-08 RX ADMIN — Medication 10 MILLIGRAM(S): at 08:02

## 2019-01-08 RX ADMIN — Medication 300 MILLIGRAM(S): at 18:07

## 2019-01-08 RX ADMIN — Medication 650 MILLIGRAM(S): at 12:14

## 2019-01-08 NOTE — PROGRESS NOTE ADULT - ASSESSMENT
32 yo F w/ PMH of gestational HTN, asthma and PCOS transferred from [facility] for management of SAH with CTH showing small SAH in left frontotemporal region.    # Fever r/o infection - resolved  - urine culture contaminated, blood culture no growth  - completed Rocephin 1g q24h (started 1/4) for 5d    #SAH s/p diagnostic cerebral angiogram   - CTA head and neck (12/30): negative for any large vessel occlusion or significant stenosis  - MRV (1/1): concerning for venous sinus thrombosis  - MR Head (1/2): left temproal superficial cavernoma  - CT perfusion (1/6): areas of relative ischemia, unknown clinical significance   - CT head (1/6): subarachnoid hemorrhage nearly resolved   - c/w labetolol 300 mg PO q6h  - c/w nimodipine 30 mg PO q2h and NS @ 75 to prevent vasospasm  - target SBP goal 110-140  - hydrazine IV 10mg q6h PRN to maintain -140  - c/w keppra 750 mg q12  - Mg 400 mg PO q8h  - c/w neuro checks q4h   - f/u BMP and Mg    # Hypertension, uncontrolled, r/o secondary cause  - less likely to be eclampsia   - ruled out polycystic kidney disease, order US kidney  - c/w labetalol  - hydralazine PRN to target -140    # LBP and b/l LE pain likely secondary to immobilization  - r/o secondary causes  - ordered LS Xray   - lidocaine patch PRN    - negative DVT on US     # Intractable headache / nausea secondary to SAH  - zofran PRN for nausea   - tylenol PRN for headache    Diet: DASH/TLC   GI ppx: ( protonix )  DVT ppx: ( SCD )  Activity: ambulate as tolerated  Code status: Full Code ( X ) / DNR (  ) / DNI (  )  Disposition: from home

## 2019-01-08 NOTE — PROGRESS NOTE ADULT - SUBJECTIVE AND OBJECTIVE BOX
Subjective: 33yFemale with a pmhx of SAH  ^SAH  Family history of hypertension (Mother)  Family history of high cholesterol (Father)  Handoff  Gestational hypertension  Asthma  PCOS (polycystic ovarian syndrome)    s/p 34 y/o female with resolving SAH, possible left caveroma  Pt seen and examined at bedside.  Pt complaining of slight headache and pain starting in back, radiating down both legs.  Pt states shes ambulated around ICU 2 times.     Allergies    Alcohol (Flushing)  Tamiflu (Unknown)    Intolerances        Vital Signs Last 24 Hrs  T(C): 36.1 (08 Jan 2019 08:00), Max: 37.2 (07 Jan 2019 12:00)  T(F): 96.9 (08 Jan 2019 08:00), Max: 98.9 (07 Jan 2019 12:00)  HR: 76 (08 Jan 2019 10:00) (58 - 86)  BP: 119/63 (08 Jan 2019 10:00) (107/65 - 160/92)  BP(mean): 83 (08 Jan 2019 10:00) (77 - 130)  RR: 19 (08 Jan 2019 10:00) (9 - 33)  SpO2: 99% (08 Jan 2019 10:00) (98% - 100%)      acetaminophen   Tablet .. 650 milliGRAM(s) Oral every 4 hours PRN  cefTRIAXone   IVPB 1 Gram(s) IV Intermittent every 24 hours  cefTRIAXone   IVPB      chlorhexidine 4% Liquid 1 Application(s) Topical <User Schedule>  docusate sodium 100 milliGRAM(s) Oral three times a day  hydrALAZINE Injectable 10 milliGRAM(s) IV Push every 6 hours PRN  ketorolac   Injectable 10 milliGRAM(s) IV Push every 8 hours PRN  labetalol 300 milliGRAM(s) Oral every 6 hours  levETIRAcetam  IVPB 750 milliGRAM(s) IV Intermittent every 12 hours  magnesium oxide 400 milliGRAM(s) Oral three times a day with meals  multivitamin 1 Tablet(s) Oral daily  niMODipine 30 milliGRAM(s) Oral every 2 hours  pantoprazole    Tablet 40 milliGRAM(s) Oral before breakfast  senna 2 Tablet(s) Oral at bedtime  sodium chloride 0.9%. 1000 milliLiter(s) IV Continuous <Continuous>        01-07-19 @ 07:01  -  01-08-19 @ 07:00  --------------------------------------------------------  IN: 2150 mL / OUT: 1325 mL / NET: 825 mL    01-08-19 @ 07:01  -  01-08-19 @ 10:27  --------------------------------------------------------  IN: 100 mL / OUT: 0 mL / NET: 100 mL          Exam:  AAOX3. Verbal function intact  tongue midline, facial motions symmetric  PERRL, EOMI  Pronator Drift: none  Finger to Nose intact  Motor: MAEx4  Sensation: intact to touch b/l        CBC Full  -  ( 08 Jan 2019 05:21 )  WBC Count : 7.33 K/uL  Hemoglobin : 13.0 g/dL  Hematocrit : 39.6 %  Platelet Count - Automated : 255 K/uL  Mean Cell Volume : 85.9 fL  Mean Cell Hemoglobin : 28.2 pg  Mean Cell Hemoglobin Concentration : 32.8 g/dL  Auto Neutrophil # : 4.96 K/uL  Auto Lymphocyte # : 1.17 K/uL  Auto Monocyte # : 0.61 K/uL  Auto Eosinophil # : 0.48 K/uL  Auto Basophil # : 0.06 K/uL  Auto Neutrophil % : 67.7 %  Auto Lymphocyte % : 16.0 %  Auto Monocyte % : 8.3 %  Auto Eosinophil % : 6.5 %  Auto Basophil % : 0.8 %    01-08    142  |  106  |  22<H>  ----------------------------<  83  4.4   |  18  |  0.6<L>    Ca    8.8      08 Jan 2019 05:21  Mg     2.0     01-08          Imaging: < from: CT Perfusion w/ Maps w/ IV Cont (01.06.19 @ 20:25) >  IMPRESSION:     1.  No evidence of major vascular stenosis or occlusion. No definite   perfusion abnormality demonstrated.    2.  Utilizing the lower threshold index for ischemia (Tmax >4s) there are   areas of relative ischemia in the white matter of bilateral frontal,   temporal and parietal lobes (total volume 62 mL). This is of uncertain   clinical significance. Given clinical concern for vasospasm, attention on   follow up imaging is recommended.    < end of copied text >      Assessment/Plan:   as above  rec hold Toradol  will follow  will discuss w attg

## 2019-01-08 NOTE — PROGRESS NOTE ADULT - ASSESSMENT
IMPRESSION:    SAH s/p angio  Venous sinus thrombosis ruled out  Hypertension      PLAN:    CNS: keppra,,nimodipine, interventional neuro f/u. F/U MRI results.    HEENT: Oral care    PULMONARY:  HOB @ 45 degrees, keep spo2 more than 92%    CARDIOVASCULAR: SBP less than 140mmhg.  labetalol po, and can add hydralazine to control BP.    GI: GI prophylaxis.  PO diet.    RENAL:  Follow up lytes.  Correct as needed.    INFECTIOUS DISEASE: finish abx course.    HEMATOLOGICAL:  DVT prophylaxis: mechanical DVT prophylaxis.     ENDOCRINE:  Follow up FS.  Insulin protocol if needed.    MUSCULOSKELETAL: out of bed     Interventional neurology follow up.    Can be downgraded to stroke unit as per neuro.

## 2019-01-08 NOTE — PROGRESS NOTE ADULT - SUBJECTIVE AND OBJECTIVE BOX
________________________________________________________________________________  DAILY PROGRESS NOTE:    ================== SUBJECTIVE ==================    BALDEV KEE  /   33y  /  Female  /  MRN#: [id number]  Patient is a 33y old Female who presents with a chief complaint of SAH , transfer from [facility] (2019 11:05)  Currently admitted to medicine with the primary diagnosis of     HOSPITAL DAY: 9d     ICU DAY: 5d    SUMMARY OF HOSPITAL COURSE TO DATE   34 y/o female  w/ PMH of Gestational HTN, PCOS and asthma transferred from [facility] for management of SAH. As per patient she developed severe headache since 3:30pm on day of presentation. Headache started while patient laying down and described as sudden in onset , diffuse , 8/10 in intensity and progressively worsening since afternoon a/w photophobia , nausea but no vomiting. Headache worsens with head movements. CTH at [facility] showed SAH for which the neurosurgeon at [facility] Dr. Calderon discussed case with dr fontanez and patient was transferred to [facility] for possible intervention.     At [facility], pt underwent cerebral angiogram. Pt underwent MRV after and was found to have ?venous sinus thrombosis on MRV. As per neurology, it can be secondary to artifact. Neurology wanted a repeat MRI with IV contrast.     ICU day 3, pt still has significant headache which improved compared to admission. Pt is currently awaiting for MRI with IV contrast.     ICU Day 4 (1/3), pt's headache improved. Pt underwent MRI w/ contrast yesterday. Spoke with neurology team, concerned for preclampsia post partum and required ob consult. Ob was consulted and doubted eclampsia given risk highest in the first 24hr postpartum. Pt continued to be on Mg and Nicardipine drip as per neuro to maintain Mg of 2-4 and SBP < 120. Pt was started Tylenol standing q4h to prevent headache. She also spiked a fever of 101 yesterday afternoon. Panculture taken and needed f/u.     ICU Day 5 (), pt's headache resolved. Neurology recommended to downgrade pt to stroke unit. Nicardipine to be d/c and switched to labetalol. Pt started Rocephin for UA positive.     ICU Day 7 (), pt again became hypertensive and was restarted on cardene labetalol was increased. As per neuro, would like to keep patient in ICU for monitoring. Neuro is concerning for vasospasm. CTH and CT perfusion of head were ordered and performed. Pt is NPO for possible angiogram tomorrow.     ICU Day 8 (), pt is off cardene drip and SBP is <140.     ICU Day 9 (), pt's BP is controlled with current oral anti-HTN meds. As per Dr. Conde, pt can be downgraded to stroke unit today.       OVERNIGHT EVENTS: none    TODAY: Patient was seen this morning at bedside. Currently, the patient reports LBP.     Review of systems is otherwise negative.    =================== OBJECTIVE ===================    VITAL SIGNS: Last 24 Hours  T(C): 36.1 (2019 08:00), Max: 37.2 (2019 12:00)  T(F): 96.9 (2019 08:00), Max: 98.9 (2019 12:00)  HR: 76 (2019 10:00) (58 - 86)  BP: 119/63 (2019 10:00) (107/65 - 160/92)  BP(mean): 83 (2019 10:00) (77 - 130)  RR: 19 (2019 10:00) (9 - 33)  SpO2: 99% (2019 10:00) (98% - 100%)    19 @ 07:01  -  19 @ 07:00  --------------------------------------------------------  IN: 2150 mL / OUT: 1325 mL / NET: 825 mL    19 @ 07:01  -  19 @ 11:13  --------------------------------------------------------  IN: 100 mL / OUT: 0 mL / NET: 100 mL      PHYSICAL EXAM:  GEN: NAD, comfortable  LUNGS: CTAB, no w/r/r  HEART: RRR, no m/r/g  ABD: soft, NT/ND, +BS  EXT: no edema, PP b/l  NEURO: AAOx3, non-focal neurological exam    ===================== LABS =====================                        13.0   7.33  )-----------( 255      ( 2019 05:21 )             39.6     -08    142  |  106  |  22<H>  ----------------------------<  83  4.4   |  18  |  0.6<L>    Ca    8.8      2019 05:21  Mg     2.0                 ================= MICROBIOLOGY ================    ================== IMAGING TO DATE ==================  < from: Xray Chest 1 View- PORTABLE-Routine (19 @ 05:00) >  Impression:    No lobar consolidation, effusion or pneumothorax.  < end of copied text >    ================== OTHER SIGNIFICANT FINDINGS ==================    ================== ALLERGIES ===================  Alcohol (Flushing)  Tamiflu (Unknown)    ==================== MEDS =====================  cefTRIAXone   IVPB 1 Gram(s) IV Intermittent every 24 hours  cefTRIAXone   IVPB      chlorhexidine 4% Liquid 1 Application(s) Topical <User Schedule>  docusate sodium 100 milliGRAM(s) Oral three times a day  labetalol 300 milliGRAM(s) Oral every 6 hours  levETIRAcetam  IVPB 750 milliGRAM(s) IV Intermittent every 12 hours  lidocaine   Patch 1 Patch Transdermal daily  magnesium oxide 400 milliGRAM(s) Oral three times a day with meals  multivitamin 1 Tablet(s) Oral daily  niMODipine 30 milliGRAM(s) Oral every 2 hours  pantoprazole    Tablet 40 milliGRAM(s) Oral before breakfast  senna 2 Tablet(s) Oral at bedtime  sodium chloride 0.9%. 1000 milliLiter(s) IV Continuous <Continuous>    PRN MEDICATIONS  acetaminophen   Tablet .. 650 milliGRAM(s) Oral every 4 hours PRN  hydrALAZINE Injectable 10 milliGRAM(s) IV Push every 6 hours PRN  ketorolac   Injectable 10 milliGRAM(s) IV Push every 8 hours PRN      ================= CONSULTS ==================  Neurosurgery PA ()  Assessment/Plan:   as above  rec hold Toradol

## 2019-01-08 NOTE — CHART NOTE - [id number]_[id number]_CORE
MICU Transfer Note    Transfer from: MICU  Transfer to:  (  ) Medicine    (  ) Telemetry    (  ) RCU    (  ) Palliative    (  ) Stroke Unit    (  ) _______________  Accepting physican:      MICU COURSE:    34 y/o female  w/ PMH of Gestational HTN, PCOS and asthma transferred from [facility] for management of SAH. As per patient she developed severe headache since 3:30pm on day of presentation. Headache started while patient laying down and described as sudden in onset , diffuse , 8/10 in intensity and progressively worsening since afternoon a/w photophobia , nausea but no vomiting. Headache worsens with head movements. CTH at [facility] showed SAH for which the neurosurgeon at [facility] Dr. Calderon discussed case with dr fontanez and patient was transferred to [facility] for possible intervention.     At [facility], pt underwent cerebral angiogram. Pt underwent MRV after and was found to have ?venous sinus thrombosis on MRV. As per neurology, it can be secondary to artifact. Neurology wanted a repeat MRI with IV contrast.     ICU day 3, pt still has significant headache which improved compared to admission. Pt is currently awaiting for MRI with IV contrast.     ICU Day 4 (1/3), pt's headache improved. Pt underwent MRI w/ contrast yesterday. Spoke with neurology team, concerned for preclampsia post partum and required ob consult. Ob was consulted and doubted eclampsia given risk highest in the first 24hr postpartum. Pt continued to be on Mg and Nicardipine drip as per neuro to maintain Mg of 2-4 and SBP < 120. Pt was started Tylenol standing q4h to prevent headache. She also spiked a fever of 101 yesterday afternoon. Panculture taken and needed f/u.     ICU Day 5 (), pt's headache resolved. Neurology recommended to downgrade pt to stroke unit. Nicardipine to be d/c and switched to labetalol. Pt started Rocephin for UA positive.     ICU Day 7 (), pt again became hypertensive and was restarted on cardene labetalol was increased. As per neuro, would like to keep patient in ICU for monitoring. Neuro is concerning for vasospasm. CTH and CT perfusion of head were ordered and performed. Pt is NPO for possible angiogram tomorrow.     ICU Day 8 (), pt is off cardene drip and SBP is <140.     ICU Day 9 (), pt's BP is controlled with current oral anti-HTN meds. As per Dr. Conde, pt can be downgraded to stroke unit today.         Vital Signs Last 24 Hrs  T(C): 36.1 (2019 08:00), Max: 37.2 (2019 12:00)  T(F): 96.9 (2019 08:00), Max: 98.9 (2019 12:00)  HR: 76 (2019 10:00) (58 - 86)  BP: 119/63 (2019 10:00) (107/65 - 160/92)  BP(mean): 83 (2019 10:00) (77 - 130)  RR: 19 (2019 10:00) (9 - 33)  SpO2: 99% (2019 10:00) (98% - 100%)  I&O's Summary    2019 07:  -  2019 07:00  --------------------------------------------------------  IN: 2150 mL / OUT: 1325 mL / NET: 825 mL    2019 07:01  -  2019 11:35  --------------------------------------------------------  IN: 100 mL / OUT: 0 mL / NET: 100 mL          MEDICATIONS  (STANDING):  chlorhexidine 4% Liquid 1 Application(s) Topical <User Schedule>  docusate sodium 100 milliGRAM(s) Oral three times a day  labetalol 300 milliGRAM(s) Oral every 6 hours  levETIRAcetam  IVPB 750 milliGRAM(s) IV Intermittent every 12 hours  lidocaine   Patch 1 Patch Transdermal daily  magnesium oxide 400 milliGRAM(s) Oral three times a day with meals  multivitamin 1 Tablet(s) Oral daily  niMODipine 30 milliGRAM(s) Oral every 2 hours  pantoprazole    Tablet 40 milliGRAM(s) Oral before breakfast  senna 2 Tablet(s) Oral at bedtime  sodium chloride 0.9%. 1000 milliLiter(s) (100 mL/Hr) IV Continuous <Continuous>    MEDICATIONS  (PRN):  acetaminophen   Tablet .. 650 milliGRAM(s) Oral every 4 hours PRN Moderate Pain (4 - 6)  hydrALAZINE Injectable 10 milliGRAM(s) IV Push every 6 hours PRN Hypertension        LABS                                            13.0                  Neurophils% (auto):   67.7   ( @ 05:21):    7.33 )-----------(255          Lymphocytes% (auto):  16.0                                          39.6                   Eosinphils% (auto):   6.5      Manual%: Neutrophils x    ; Lymphocytes x    ; Eosinophils x    ; Bands%: x    ; Blasts x                                    142    |  106    |  22                  Calcium: 8.8   / iCa: x      ( @ 05:21)    ----------------------------<  83        Magnesium: 2.0                              4.4     |  18     |  0.6              Phosphorous: x          ASSESSMENT & PLAN:     32 yo F w/ PMH of gestational HTN, asthma and PCOS transferred from [facility] for management of SAH with CTH showing small SAH in left frontotemporal region.    # Fever r/o infection - resolved  - urine culture contaminated, blood culture no growth  - completed Rocephin 1g q24h (started ) for 5d    #SAH s/p diagnostic cerebral angiogram   - CTA head and neck (): negative for any large vessel occlusion or significant stenosis  - MRV (): concerning for venous sinus thrombosis  - MR Head (): left temproal superficial cavernoma  - CT perfusion (): areas of relative ischemia, unknown clinical significance   - CT head (): subarachnoid hemorrhage nearly resolved   - c/w labetolol 300 mg PO q6h  - c/w nimodipine 30 mg PO q2h and NS @ 75 to prevent vasospasm  - target SBP goal 110-140  - hydrazine IV 10mg q6h PRN to maintain -140  - c/w keppra 750 mg q12  - Mg 400 mg PO q8h  - c/w neuro checks q4h   - f/u BMP and Mg    # Hypertension, uncontrolled, r/o secondary cause  - less likely to be eclampsia   - ruled out polycystic kidney disease, order US kidney  - c/w labetalol  - hydralazine PRN to target -140    # LBP and b/l LE pain likely secondary to immobilization  - r/o secondary causes  - ordered LS Xray   - lidocaine patch PRN    - negative DVT on US     # Intractable headache / nausea secondary to SAH  - zofran PRN for nausea   - tylenol PRN for headache    Diet: DASH/TLC   GI ppx: ( protonix )  DVT ppx: ( SCD )  Activity: ambulate as tolerated  Code status: Full Code ( X ) / DNR (  ) / DNI (  )  Disposition: from home      For Follow-Up:  - continue to maintain SBP between 110 - 140   - hydralazine PRN for SBP > 140  - if SBP consistently > 140, may add ACEI/ARB; as per pt, she will not breastfeed  - f/u on X-ray of LS, pt complaining of back pain  - c/w neuro check q4h as per Dr. Conde MICU Transfer Note    Transfer from: MICU  Transfer to:  (  ) Medicine    (  ) Telemetry    (  ) RCU    (  ) Palliative    ( X ) Stroke Unit    (  ) _______________  Accepting physican:      MICU COURSE:    34 y/o female  w/ PMH of Gestational HTN, PCOS and asthma transferred from [facility] for management of SAH. As per patient she developed severe headache since 3:30pm on day of presentation. Headache started while patient laying down and described as sudden in onset , diffuse , 8/10 in intensity and progressively worsening since afternoon a/w photophobia , nausea but no vomiting. Headache worsens with head movements. CTH at [facility] showed SAH for which the neurosurgeon at [facility] Dr. Calderon discussed case with dr fontanez and patient was transferred to [facility] for possible intervention.     At [facility], pt underwent cerebral angiogram. Pt underwent MRV after and was found to have ?venous sinus thrombosis on MRV. As per neurology, it can be secondary to artifact. Neurology wanted a repeat MRI with IV contrast.     ICU day 3, pt still has significant headache which improved compared to admission. Pt is currently awaiting for MRI with IV contrast.     ICU Day 4 (1/3), pt's headache improved. Pt underwent MRI w/ contrast yesterday. Spoke with neurology team, concerned for preclampsia post partum and required ob consult. Ob was consulted and doubted eclampsia given risk highest in the first 24hr postpartum. Pt continued to be on Mg and Nicardipine drip as per neuro to maintain Mg of 2-4 and SBP < 120. Pt was started Tylenol standing q4h to prevent headache. She also spiked a fever of 101 yesterday afternoon. Panculture taken and needed f/u.     ICU Day 5 (), pt's headache resolved. Neurology recommended to downgrade pt to stroke unit. Nicardipine to be d/c and switched to labetalol. Pt started Rocephin for UA positive.     ICU Day 7 (), pt again became hypertensive and was restarted on cardene labetalol was increased. As per neuro, would like to keep patient in ICU for monitoring. Neuro is concerning for vasospasm. CTH and CT perfusion of head were ordered and performed. Pt is NPO for possible angiogram tomorrow.     ICU Day 8 (), pt is off cardene drip and SBP is <140.     ICU Day 9 (), pt's BP is controlled with current oral anti-HTN meds. As per Dr. Conde, pt can be downgraded to stroke unit today.         Vital Signs Last 24 Hrs  T(C): 36.1 (2019 08:00), Max: 37.2 (2019 12:00)  T(F): 96.9 (2019 08:00), Max: 98.9 (2019 12:00)  HR: 76 (2019 10:00) (58 - 86)  BP: 119/63 (2019 10:00) (107/65 - 160/92)  BP(mean): 83 (2019 10:00) (77 - 130)  RR: 19 (2019 10:00) (9 - 33)  SpO2: 99% (2019 10:00) (98% - 100%)  I&O's Summary    2019 07:01  -  2019 07:00  --------------------------------------------------------  IN: 2150 mL / OUT: 1325 mL / NET: 825 mL    2019 07:01  -  2019 11:35  --------------------------------------------------------  IN: 100 mL / OUT: 0 mL / NET: 100 mL          MEDICATIONS  (STANDING):  chlorhexidine 4% Liquid 1 Application(s) Topical <User Schedule>  docusate sodium 100 milliGRAM(s) Oral three times a day  labetalol 300 milliGRAM(s) Oral every 6 hours  levETIRAcetam  IVPB 750 milliGRAM(s) IV Intermittent every 12 hours  lidocaine   Patch 1 Patch Transdermal daily  magnesium oxide 400 milliGRAM(s) Oral three times a day with meals  multivitamin 1 Tablet(s) Oral daily  niMODipine 30 milliGRAM(s) Oral every 2 hours  pantoprazole    Tablet 40 milliGRAM(s) Oral before breakfast  senna 2 Tablet(s) Oral at bedtime  sodium chloride 0.9%. 1000 milliLiter(s) (100 mL/Hr) IV Continuous <Continuous>    MEDICATIONS  (PRN):  acetaminophen   Tablet .. 650 milliGRAM(s) Oral every 4 hours PRN Moderate Pain (4 - 6)  hydrALAZINE Injectable 10 milliGRAM(s) IV Push every 6 hours PRN Hypertension        LABS                                            13.0                  Neurophils% (auto):   67.7   ( @ 05:21):    7.33 )-----------(255          Lymphocytes% (auto):  16.0                                          39.6                   Eosinphils% (auto):   6.5      Manual%: Neutrophils x    ; Lymphocytes x    ; Eosinophils x    ; Bands%: x    ; Blasts x                                    142    |  106    |  22                  Calcium: 8.8   / iCa: x      ( @ 05:21)    ----------------------------<  83        Magnesium: 2.0                              4.4     |  18     |  0.6              Phosphorous: x          ASSESSMENT & PLAN:     34 yo F w/ PMH of gestational HTN, asthma and PCOS transferred from [facility] for management of SAH with CTH showing small SAH in left frontotemporal region.    # Fever r/o infection - resolved  - urine culture contaminated, blood culture no growth  - completed Rocephin 1g q24h (started ) for 5d    #SAH s/p diagnostic cerebral angiogram   - CTA head and neck (): negative for any large vessel occlusion or significant stenosis  - MRV (): concerning for venous sinus thrombosis  - MR Head (): left temproal superficial cavernoma  - CT perfusion (): areas of relative ischemia, unknown clinical significance   - CT head (): subarachnoid hemorrhage nearly resolved   - c/w labetolol 300 mg PO q6h  - c/w nimodipine 30 mg PO q2h and NS @ 75 to prevent vasospasm  - target SBP goal 110-140  - hydrazine IV 10mg q6h PRN to maintain -140  - c/w keppra 750 mg q12  - Mg 400 mg PO q8h  - c/w neuro checks q4h   - f/u BMP and Mg    # Hypertension, uncontrolled, r/o secondary cause  - less likely to be eclampsia   - ruled out polycystic kidney disease, order US kidney  - c/w labetalol  - hydralazine PRN to target -140    # LBP and b/l LE pain likely secondary to immobilization  - r/o secondary causes  - ordered LS Xray   - lidocaine patch PRN    - negative DVT on US     # Intractable headache / nausea secondary to SAH  - zofran PRN for nausea   - tylenol PRN for headache    Diet: DASH/TLC   GI ppx: ( protonix )  DVT ppx: ( SCD )  Activity: ambulate as tolerated  Code status: Full Code ( X ) / DNR (  ) / DNI (  )  Disposition: from home      For Follow-Up:  - continue to maintain SBP between 110 - 140   - hydralazine PRN for SBP > 140  - if SBP consistently > 140, may add ACEI/ARB; as per pt, she will not breastfeed  - f/u on X-ray of LS, pt complaining of back pain  - c/w neuro check q4h as per Dr. Conde

## 2019-01-09 LAB
ANION GAP SERPL CALC-SCNC: 18 MMOL/L — HIGH (ref 7–14)
BASOPHILS # BLD AUTO: 0.07 K/UL — SIGNIFICANT CHANGE UP (ref 0–0.2)
BASOPHILS NFR BLD AUTO: 1.2 % — HIGH (ref 0–1)
BUN SERPL-MCNC: 23 MG/DL — HIGH (ref 10–20)
CALCIUM SERPL-MCNC: 9.2 MG/DL — SIGNIFICANT CHANGE UP (ref 8.5–10.1)
CHLORIDE SERPL-SCNC: 104 MMOL/L — SIGNIFICANT CHANGE UP (ref 98–110)
CO2 SERPL-SCNC: 19 MMOL/L — SIGNIFICANT CHANGE UP (ref 17–32)
CREAT SERPL-MCNC: 0.6 MG/DL — LOW (ref 0.7–1.5)
EOSINOPHIL # BLD AUTO: 0.47 K/UL — SIGNIFICANT CHANGE UP (ref 0–0.7)
EOSINOPHIL NFR BLD AUTO: 8.1 % — HIGH (ref 0–8)
GLUCOSE SERPL-MCNC: 82 MG/DL — SIGNIFICANT CHANGE UP (ref 70–99)
HCT VFR BLD CALC: 40.4 % — SIGNIFICANT CHANGE UP (ref 37–47)
HGB BLD-MCNC: 13.3 G/DL — SIGNIFICANT CHANGE UP (ref 12–16)
IMM GRANULOCYTES NFR BLD AUTO: 0.3 % — SIGNIFICANT CHANGE UP (ref 0.1–0.3)
LYMPHOCYTES # BLD AUTO: 1.28 K/UL — SIGNIFICANT CHANGE UP (ref 1.2–3.4)
LYMPHOCYTES # BLD AUTO: 22.2 % — SIGNIFICANT CHANGE UP (ref 20.5–51.1)
MAGNESIUM SERPL-MCNC: 2 MG/DL — SIGNIFICANT CHANGE UP (ref 1.8–2.4)
MCHC RBC-ENTMCNC: 28.5 PG — SIGNIFICANT CHANGE UP (ref 27–31)
MCHC RBC-ENTMCNC: 32.9 G/DL — SIGNIFICANT CHANGE UP (ref 32–37)
MCV RBC AUTO: 86.7 FL — SIGNIFICANT CHANGE UP (ref 81–99)
MONOCYTES # BLD AUTO: 0.59 K/UL — SIGNIFICANT CHANGE UP (ref 0.1–0.6)
MONOCYTES NFR BLD AUTO: 10.2 % — HIGH (ref 1.7–9.3)
NEUTROPHILS # BLD AUTO: 3.34 K/UL — SIGNIFICANT CHANGE UP (ref 1.4–6.5)
NEUTROPHILS NFR BLD AUTO: 58 % — SIGNIFICANT CHANGE UP (ref 42.2–75.2)
NRBC # BLD: 0 /100 WBCS — SIGNIFICANT CHANGE UP (ref 0–0)
PLATELET # BLD AUTO: 275 K/UL — SIGNIFICANT CHANGE UP (ref 130–400)
POTASSIUM SERPL-MCNC: 4.3 MMOL/L — SIGNIFICANT CHANGE UP (ref 3.5–5)
POTASSIUM SERPL-SCNC: 4.3 MMOL/L — SIGNIFICANT CHANGE UP (ref 3.5–5)
RBC # BLD: 4.66 M/UL — SIGNIFICANT CHANGE UP (ref 4.2–5.4)
RBC # FLD: 12.7 % — SIGNIFICANT CHANGE UP (ref 11.5–14.5)
SODIUM SERPL-SCNC: 141 MMOL/L — SIGNIFICANT CHANGE UP (ref 135–146)
WBC # BLD: 5.77 K/UL — SIGNIFICANT CHANGE UP (ref 4.8–10.8)
WBC # FLD AUTO: 5.77 K/UL — SIGNIFICANT CHANGE UP (ref 4.8–10.8)

## 2019-01-09 RX ORDER — DEXAMETHASONE 0.5 MG/5ML
4 ELIXIR ORAL DAILY
Qty: 0 | Refills: 0 | Status: DISCONTINUED | OUTPATIENT
Start: 2019-01-09 | End: 2019-01-10

## 2019-01-09 RX ORDER — LEVETIRACETAM 250 MG/1
750 TABLET, FILM COATED ORAL
Qty: 0 | Refills: 0 | Status: DISCONTINUED | OUTPATIENT
Start: 2019-01-09 | End: 2019-01-10

## 2019-01-09 RX ORDER — SODIUM CHLORIDE 9 MG/ML
1000 INJECTION INTRAMUSCULAR; INTRAVENOUS; SUBCUTANEOUS ONCE
Qty: 0 | Refills: 0 | Status: COMPLETED | OUTPATIENT
Start: 2019-01-09 | End: 2019-01-09

## 2019-01-09 RX ADMIN — CHLORHEXIDINE GLUCONATE 1 APPLICATION(S): 213 SOLUTION TOPICAL at 05:51

## 2019-01-09 RX ADMIN — NIMODIPINE 30 MILLIGRAM(S): 60 SOLUTION ORAL at 05:38

## 2019-01-09 RX ADMIN — Medication 600 MILLIGRAM(S): at 22:03

## 2019-01-09 RX ADMIN — MAGNESIUM OXIDE 400 MG ORAL TABLET 400 MILLIGRAM(S): 241.3 TABLET ORAL at 19:31

## 2019-01-09 RX ADMIN — NIMODIPINE 30 MILLIGRAM(S): 60 SOLUTION ORAL at 04:20

## 2019-01-09 RX ADMIN — Medication 300 MILLIGRAM(S): at 19:30

## 2019-01-09 RX ADMIN — Medication 100 MILLIGRAM(S): at 05:37

## 2019-01-09 RX ADMIN — NIMODIPINE 30 MILLIGRAM(S): 60 SOLUTION ORAL at 16:13

## 2019-01-09 RX ADMIN — NIMODIPINE 30 MILLIGRAM(S): 60 SOLUTION ORAL at 02:11

## 2019-01-09 RX ADMIN — Medication 650 MILLIGRAM(S): at 20:14

## 2019-01-09 RX ADMIN — NIMODIPINE 30 MILLIGRAM(S): 60 SOLUTION ORAL at 14:27

## 2019-01-09 RX ADMIN — NIMODIPINE 30 MILLIGRAM(S): 60 SOLUTION ORAL at 19:33

## 2019-01-09 RX ADMIN — LEVETIRACETAM 750 MILLIGRAM(S): 250 TABLET, FILM COATED ORAL at 19:29

## 2019-01-09 RX ADMIN — MAGNESIUM OXIDE 400 MG ORAL TABLET 400 MILLIGRAM(S): 241.3 TABLET ORAL at 12:25

## 2019-01-09 RX ADMIN — Medication 650 MILLIGRAM(S): at 15:55

## 2019-01-09 RX ADMIN — LIDOCAINE 1 PATCH: 4 CREAM TOPICAL at 12:26

## 2019-01-09 RX ADMIN — NIMODIPINE 30 MILLIGRAM(S): 60 SOLUTION ORAL at 02:05

## 2019-01-09 RX ADMIN — Medication 1 TABLET(S): at 12:28

## 2019-01-09 RX ADMIN — Medication 300 MILLIGRAM(S): at 05:38

## 2019-01-09 RX ADMIN — NIMODIPINE 30 MILLIGRAM(S): 60 SOLUTION ORAL at 10:18

## 2019-01-09 RX ADMIN — NIMODIPINE 30 MILLIGRAM(S): 60 SOLUTION ORAL at 12:29

## 2019-01-09 RX ADMIN — Medication 650 MILLIGRAM(S): at 17:33

## 2019-01-09 RX ADMIN — Medication 100 MILLIGRAM(S): at 22:02

## 2019-01-09 RX ADMIN — MAGNESIUM OXIDE 400 MG ORAL TABLET 400 MILLIGRAM(S): 241.3 TABLET ORAL at 08:13

## 2019-01-09 RX ADMIN — LEVETIRACETAM 400 MILLIGRAM(S): 250 TABLET, FILM COATED ORAL at 05:38

## 2019-01-09 RX ADMIN — Medication 4 MILLIGRAM(S): at 19:28

## 2019-01-09 RX ADMIN — LIDOCAINE 1 PATCH: 4 CREAM TOPICAL at 04:20

## 2019-01-09 RX ADMIN — Medication 300 MILLIGRAM(S): at 12:26

## 2019-01-09 RX ADMIN — NIMODIPINE 30 MILLIGRAM(S): 60 SOLUTION ORAL at 08:13

## 2019-01-09 RX ADMIN — SENNA PLUS 2 TABLET(S): 8.6 TABLET ORAL at 22:02

## 2019-01-09 RX ADMIN — Medication 600 MILLIGRAM(S): at 12:25

## 2019-01-09 RX ADMIN — NIMODIPINE 30 MILLIGRAM(S): 60 SOLUTION ORAL at 22:02

## 2019-01-09 RX ADMIN — Medication 100 MILLIGRAM(S): at 14:26

## 2019-01-09 RX ADMIN — Medication 600 MILLIGRAM(S): at 17:33

## 2019-01-09 RX ADMIN — PANTOPRAZOLE SODIUM 40 MILLIGRAM(S): 20 TABLET, DELAYED RELEASE ORAL at 05:40

## 2019-01-09 RX ADMIN — Medication 300 MILLIGRAM(S): at 02:05

## 2019-01-09 RX ADMIN — Medication 650 MILLIGRAM(S): at 08:14

## 2019-01-09 RX ADMIN — SODIUM CHLORIDE 76.92 MILLILITER(S): 9 INJECTION INTRAMUSCULAR; INTRAVENOUS; SUBCUTANEOUS at 15:55

## 2019-01-09 NOTE — PROGRESS NOTE ADULT - SUBJECTIVE AND OBJECTIVE BOX
HPI:  32 y/o female  who presents 2 days post partum with hx of gestational hypertension, PCOS and asthma transferred from [facility] for management of SAH. As per patient she developed severe headache since 3:30pm on day of presentation. Headache started while patient laying down and described as sudden in onset , diffuse , 8/10 in intensity and progressively worsening since afternoon a/w photophobia , nausea but no vomiting. Headache worsens with head movements. CTH at [facility] showed SAH for which the neurosurgeon at [facility] Dr. Calderon discussed case with dr fontanez and patient was transferred to [facility] for possible intervention like coiling in am .   No history of similar events in pastVS upon admission to MICU bp 120/66 , hr 86, tmax 98.7 (31 Dec 2018 02:15)      PMH  Gestational hypertension  Asthma  PCOS (polycystic ovarian syndrome)      Acute Problems:  -occasional mild to moderate headache   -Concern for vasospasm.    History:  1 week post partum.    Last 24 hour updates:  -occasional mild -moderate headache (headaches improved)  -PO antihypertensives optimized.  -venous duplex of lower extremities (negative)    Ancillary Management:  Chest PT[]  Head of bed >35 [x]  Out of bed to chair [x]  PT/OT/ST [x]  Spirometry[]  DVT prophalaxis[x]      Overnight Events: Patient had no acute overnight issues , woke up this morning with a 2-3/10 frontal headache , she denies nausea vomiting, or visual changes. She was able to ambulate , but remains unsteady and needs assistance. No bp fluctuations or fevers overnight. Patient reports feeling better today. Her headaches have improved      Current Medications  chlorhexidine 4% Liquid 1 Application(s) Topical <User Schedule>  docusate sodium 100 milliGRAM(s) Oral three times a day  labetalol 300 milliGRAM(s) Oral every 6 hours  levETIRAcetam  IVPB 750 milliGRAM(s) IV Intermittent every 12 hours  lidocaine   Patch 1 Patch Transdermal daily  magnesium oxide 400 milliGRAM(s) Oral three times a day with meals  multivitamin 1 Tablet(s) Oral daily  niMODipine 30 milliGRAM(s) Oral every 2 hours  pantoprazole    Tablet 40 milliGRAM(s) Oral before breakfast  senna 2 Tablet(s) Oral at bedtime  sodium chloride 0.9%. 1000 milliLiter(s) IV Continuous <Continuous>    Vital signs  T(F): 96.7 (19 @ 07:33), Max: 98.1 (19 @ 19:38)  HR: 71 (19 @ 07:33) (66 - 86)  BP: 125/71 (19 @ 07:33) (107/65 - 166/101)  RR: 18 (19 @ 07:33) (16 - 38)  SpO2: 97% (19 @ 17:54) (97% - 100%)                            Labs                13.3  5.77  )-----------( 275      ( 2019 05:03 )             40.4         141  |  104  |  23<H>  ----------------------------<  82  4.3   |  19  |  0.6<L>    Ca    9.2      2019 05:03  Mg     2.0           Duplex  < from: [facility] Duplex Lower Ext Vein Scan, Bilat (19 @ 22:13) >  The common femoral, great saphenous, femoral, popliteal and small   saphenous veins were visualized bilaterally with no evidence of deep   venous thrombosis    All veins were fully compressible.  There was presence of spontaneous   flow, augmentation with distal compression and phasicity.    The anterior tibial veins were  patent     The posterior tibial veins were  patent      The peroneal veins were patent.    Impression:    No evidence of deep venous thrombosis or superficial thrombophlebitis in   the bilateral lower extremities.    < end of copied text >            Neuro Exam:  Awake []no[x]spontaneously[]occasionally[]to stimuli  AI [x]y[]n   Following commands:[x]y[]n  Oriented:[]0[]1[]2[x]3    Tracking:[x]y[]n  Language:intact  Time off sedation for exam:  Pupils:  Right  3>2       Left    3>2              Corneal:       Gag reflex:      EOMI:intact      EVD ICP:             Level(cm):          24hr(ml):                             CSF:   W:      R:     C:    P:     G:     LA:    LOC:   0   Questions: 0      Commands: 0   VF:    0   Gaze:  0   Facial:      0  RUE:  0 RLE:0    LUE:   0  LLE:0  Ataxia:            0  Sensory:0  Language:     0     Dysarthria:0  Extinction:0    NIHSS:0    GCS: 15      Hunt&Balbuena:1   m-Al: 2     CVP  MAP/CPP/SBP target: 100-140  CO:    CI:  Enzymes/Trop:      Respitory:    ABG: NA  < from: Xray Chest 1 View- PORTABLE-Routine (19 @ 05:00) >  Impression:      No lobar consolidation, effusion or pneumothorax.      < end of copied text >        Vent setting: NA      GI:  Prophalaxis:  Protonix HPI:  32 y/o female  who presents 2 days post partum with hx of gestational hypertension, PCOS and asthma transferred from [facility] for management of SAH. As per patient she developed severe headache since 3:30pm on day of presentation. Headache started while patient laying down and described as sudden in onset , diffuse , 8/10 in intensity and progressively worsening since afternoon a/w photophobia , nausea but no vomiting. Headache worsens with head movements. CTH at [facility] showed SAH for which the neurosurgeon at [facility] Dr. Calderon discussed case with dr fontanez and patient was transferred to [facility] for possible intervention like coiling in am . No history of similar events in pastVS upon admission to MICU bp 120/66 , hr 86, tmax 98.7 (31 Dec 2018 02:15)      PMH  Gestational hypertension  Asthma  PCOS (polycystic ovarian syndrome)      Acute Problems:  -persistent headache   -Concern for vasospasm.    History:  1 week post partum.    Last 24 hour updates:  -headache improved but woke up this morning with 3-4/10 frontal headache  -PO antihypertensives optimized.  -venous duplex of lower extremities (negative)    Ancillary Management:  Chest PT[]  Head of bed >35 [x]  Out of bed to chair [x]  PT/OT/ST [x]  Spirometry[]  DVT prophalaxis[x]      Overnight Events: Patient had no acute overnight issues , she reports mild headaches overnight but was able to sleep woke up this morning with a 3-4/10 frontal headache , she denies nausea vomiting, or visual changes. She was able to ambulate , but remains unsteady and needs assistance. Reports worsening headache with ambulation. No bp fluctuations or fevers overnight. Highest recorded bp was 133/75.     Current Medications  chlorhexidine 4% Liquid 1 Application(s) Topical <User Schedule>  docusate sodium 100 milliGRAM(s) Oral three times a day  labetalol 300 milliGRAM(s) Oral every 6 hours  levETIRAcetam  IVPB 750 milliGRAM(s) IV Intermittent every 12 hours  lidocaine   Patch 1 Patch Transdermal daily  magnesium oxide 400 milliGRAM(s) Oral three times a day with meals  multivitamin 1 Tablet(s) Oral daily  niMODipine 30 milliGRAM(s) Oral every 2 hours  pantoprazole    Tablet 40 milliGRAM(s) Oral before breakfast  senna 2 Tablet(s) Oral at bedtime  sodium chloride 0.9%. 1000 milliLiter(s) IV Continuous <Continuous>    Vital signs  T(F): 96.7 (19 @ 07:33), Max: 98.1 (19 @ 19:38)  HR: 71 (19 @ 07:33) (66 - 86)  BP: 125/71 (19 @ 07:33) (107/65 - 166/101)  RR: 18 (19 @ 07:33) (16 - 38)  SpO2: 97% (19 @ 17:54) (97% - 100%)                            Labs                13.3  5.77  )-----------( 275      ( 2019 05:03 )             40.4         141  |  104  |  23<H>  ----------------------------<  82  4.3   |  19  |  0.6<L>    Ca    9.2      2019 05:03  Mg     2.0           Duplex  < from: [facility] Duplex Lower Ext Vein Scan, Bilat (19 @ 22:13) >  The common femoral, great saphenous, femoral, popliteal and small   saphenous veins were visualized bilaterally with no evidence of deep   venous thrombosis    All veins were fully compressible.  There was presence of spontaneous   flow, augmentation with distal compression and phasicity.    The anterior tibial veins were  patent     The posterior tibial veins were  patent      The peroneal veins were patent.    Impression:    No evidence of deep venous thrombosis or superficial thrombophlebitis in   the bilateral lower extremities.    < end of copied text >            Neuro Exam:  Awake []no[x]spontaneously[]occasionally[]to stimuli  AI [x]y[]n   Following commands:[x]y[]n  Oriented:[]0[]1[]2[x]3    Tracking:[x]y[]n  Language:intact  Time off sedation for exam:  Pupils:  Right  3>2       Left    3>2              Corneal:       Gag reflex:      EOMI:intact      EVD ICP:             Level(cm):          24hr(ml):                             CSF:   W:      R:     C:    P:     G:     LA:    LOC:   0   Questions: 0      Commands: 0   VF:    0   Gaze:  0   Facial:      0  RUE:  0 RLE:0    LUE:   0  LLE:0  Ataxia:            0  Sensory:0  Language:     0     Dysarthria:0  Extinction:0    NIHSS:0    GCS: 15      Hunt&Balbuena:1   m-Al: 2     CVP  MAP/CPP/SBP target: 100-140  CO:    CI:  Enzymes/Trop:      Respitory:    ABG: NA  < from: Xray Chest 1 View- PORTABLE-Routine (19 @ 05:00) >  Impression:      No lobar consolidation, effusion or pneumothorax.      < end of copied text >        Vent setting: NA      GI:  Prophalaxis:  Protonix HPI:  32 y/o female  who presents 2 days post partum with hx of gestational hypertension, PCOS and asthma transferred from [facility] for management of SAH. As per patient she developed severe headache since 3:30pm on day of presentation. Headache started while patient laying down and described as sudden in onset , diffuse , 8/10 in intensity and progressively worsening since afternoon a/w photophobia , nausea but no vomiting. Headache worsens with head movements. CTH at [facility] showed SAH for which the neurosurgeon at [facility] Dr. Calderon discussed case with dr fontanez and patient was transferred to [facility] for possible intervention like coiling in am . No history of similar events in pastVS upon admission to MICU bp 120/66 , hr 86, tmax 98.7 (31 Dec 2018 02:15)      PMH  Gestational hypertension  Asthma  PCOS (polycystic ovarian syndrome)      Acute Problems:  -persistent headache   -persistent low back pain , worse with ambulation and leaning forward  -Concern for vasospasm.    History:  1 week post partum.    Last 24 hour updates:  -headache persistent , fluctuating in intensity  -PO antihypertensives optimized.  -venous duplex of lower extremities (negative)    Ancillary Management:  Chest PT[]  Head of bed >35 [x]  Out of bed to chair [x]  PT/OT/ST [x]  Spirometry[]  DVT prophalaxis[x]      Overnight Events: Patient had no acute overnight issues , she reports mild headaches overnight but was able to sleep woke up this morning with a 3-4/10 frontal headache , she denies nausea vomiting, or visual changes. Reports worsening headache with ambulation. She also reports persistent low back pain which is worse with ambulation and leaning forward. She was able to ambulate , but remains unsteady and needs assistance. No bp fluctuations or fevers overnight. Highest recorded bp was 133/75 and lowest was 108/68    Current Medications  chlorhexidine 4% Liquid 1 Application(s) Topical <User Schedule>  docusate sodium 100 milliGRAM(s) Oral three times a day  labetalol 300 milliGRAM(s) Oral every 6 hours  levETIRAcetam  IVPB 750 milliGRAM(s) IV Intermittent every 12 hours  lidocaine   Patch 1 Patch Transdermal daily  magnesium oxide 400 milliGRAM(s) Oral three times a day with meals  multivitamin 1 Tablet(s) Oral daily  niMODipine 30 milliGRAM(s) Oral every 2 hours  pantoprazole    Tablet 40 milliGRAM(s) Oral before breakfast  senna 2 Tablet(s) Oral at bedtime  sodium chloride 0.9%. 1000 milliLiter(s) IV Continuous <Continuous>    Vital signs  T(F): 96.7 (19 @ 07:33), Max: 98.1 (19 @ 19:38)  HR: 71 (19 @ 07:33) (66 - 86)  BP: 125/71 (19 @ 07:33) (107/65 - 166/101)  RR: 18 (19 @ 07:33) (16 - 38)  SpO2: 97% (19 @ 17:54) (97% - 100%)                            Labs                13.3  5.77  )-----------( 275      ( 2019 05:03 )             40.4         141  |  104  |  23<H>  ----------------------------<  82  4.3   |  19  |  0.6<L>    Ca    9.2      2019 05:03  Mg     2.0           Duplex  < from: [facility] Duplex Lower Ext Vein Scan, Bilat (19 @ 22:13) >  The common femoral, great saphenous, femoral, popliteal and small   saphenous veins were visualized bilaterally with no evidence of deep   venous thrombosis    All veins were fully compressible.  There was presence of spontaneous   flow, augmentation with distal compression and phasicity.    The anterior tibial veins were  patent     The posterior tibial veins were  patent      The peroneal veins were patent.    Impression:    No evidence of deep venous thrombosis or superficial thrombophlebitis in   the bilateral lower extremities.    < end of copied text >            Neuro Exam:  Awake []no[x]spontaneously[]occasionally[]to stimuli  AI [x]y[]n   Following commands:[x]y[]n  Oriented:[]0[]1[]2[x]3    Tracking:[x]y[]n  Language:intact  Time off sedation for exam:  Pupils:  Right  3>2       Left    3>2              Corneal:       Gag reflex:      EOMI:intact      EVD ICP:             Level(cm):          24hr(ml):                             CSF:   W:      R:     C:    P:     G:     LA:    LOC:   0   Questions: 0      Commands: 0   VF:    0   Gaze:  0   Facial:      0  RUE:  0 RLE:0    LUE:   0  LLE:0  Ataxia:            0  Sensory:0  Language:     0     Dysarthria:0  Extinction:0    NIHSS:0    GCS: 15      Hunt&Balbuena:2   m-Al: 2     CVP  MAP/CPP/SBP target: 100-140  CO:    CI:  Enzymes/Trop:      Respitory:    ABG: NA  < from: Xray Chest 1 View- PORTABLE-Routine (19 @ 05:00) >  Impression:      No lobar consolidation, effusion or pneumothorax.      < end of copied text >        Vent setting: NA      GI:  Prophalaxis:  Protonix

## 2019-01-09 NOTE — CONSULT NOTE ADULT - SUBJECTIVE AND OBJECTIVE BOX
HPI:  34 y/o female  who presents 2 days post partum with hx of gestational hypertension, PCOS and asthma transferred from [facility] for management of SAH. As per patient she developed severe headache since 3:30pm on day of presentation. Headache started while patient laying down and described as sudden in onset , diffuse , 8/10 in intensity and progressively worsening since afternoon a/w photophobia , nausea but no vomiting. Headache worsens with head movements. CTH at [facility] showed SAH for which the neurosurgeon at [facility] Dr. Calderon discussed case with dr fontanez and patient was transferred to [facility] for possible intervention like coiling in am .   No history of similar events in past    VS upon admission to MICU bp 120/66 , hr 86, tmax 98.7 (31 Dec 2018 02:15)      PAST MEDICAL & SURGICAL HISTORY:  Gestational hypertension  Asthma  PCOS (polycystic ovarian syndrome)      Hospital Course:    TODAY'S SUBJECTIVE & REVIEW OF SYMPTOMS:     Constitutional WNL   Cardio WNL   Resp WNL   GI WNL  Heme WNL  Endo WNL  Skin WNL  MSK pain  Neuro WNL  Cognitive WNL  Psych WNL      MEDICATIONS  (STANDING):  chlorhexidine 4% Liquid 1 Application(s) Topical <User Schedule>  dexamethasone     Tablet 4 milliGRAM(s) Oral daily  docusate sodium 100 milliGRAM(s) Oral three times a day  labetalol 300 milliGRAM(s) Oral every 6 hours  levETIRAcetam 750 milliGRAM(s) Oral two times a day  lidocaine   Patch 1 Patch Transdermal daily  magnesium oxide 400 milliGRAM(s) Oral three times a day with meals  multivitamin 1 Tablet(s) Oral daily  niMODipine 30 milliGRAM(s) Oral every 2 hours  pantoprazole    Tablet 40 milliGRAM(s) Oral before breakfast  senna 2 Tablet(s) Oral at bedtime    MEDICATIONS  (PRN):  acetaminophen   Tablet .. 650 milliGRAM(s) Oral every 4 hours PRN Moderate Pain (4 - 6)  hydrALAZINE Injectable 10 milliGRAM(s) IV Push every 6 hours PRN Hypertension  ibuprofen  Tablet. 600 milliGRAM(s) Oral every 6 hours PRN Moderate Pain (4 - 6)      FAMILY HISTORY:  Family history of hypertension (Mother)  Family history of high cholesterol (Father)      Allergies    Alcohol ([place])  Tamiflu (Unknown)    Intolerances        SOCIAL HISTORY:    [  ] Etoh  [  ] Smoking  [  ] Substance abuse     Home Environment:  [  ] Home Alone  [x  ] Lives with Family  [  ] Home Health Aid    Dwelling:  [  ] Apartment  [x  ] Private House  [  ] Adult Home  [  ] Skilled Nursing Facility      [  ] Short Term  [  ] Long Term  [x  ] Stairs       Elevator [  ]    FUNCTIONAL STATUS PTA: (Check all that apply)  Ambulation: [ x  ]Independent    [  ] Dependent     [  ] Non-Ambulatory  Assistive Device: [  ] SA Cane  [  ]  Q Cane  [  ] Walker  [  ]  Wheelchair  ADL : [x  ] Independent  [  ]  Dependent       Vital Signs Last 24 Hrs  T(C): 37.1 (2019 12:14), Max: 37.1 (2019 12:14)  T(F): 98.7 (2019 12:14), Max: 98.7 (2019 12:14)  HR: 78 (2019 14:15) (66 - 78)  BP: 120/69 (2019 14:15) (120/69 - 166/101)  BP(mean): 108 (2019 17:00) (108 - 123)  RR: 20 (2019 12:14) (18 - 38)  SpO2: 97% (2019 17:54) (97% - 100%)      PHYSICAL EXAM: Alert & Oriented X3  GENERAL: NAD, well-groomed, well-developed  HEAD:  Atraumatic, Normocephalic  CHEST/LUNG: Clear   HEART: S1S2+  ABDOMEN: Soft, Nontender  EXTREMITIES:  no calf tenderness  BACK : left gluteal area tenderness    NERVOUS SYSTEM:  Cranial Nerves 2-12 intact [  ] Abnormal  [  ]  ROM: WFL all extremities [x  ]  Abnormal [  ]  Motor Strength: WFL all extremities  [x  ]  Abnormal [  ]  Sensation: intact to light touch [x  ] Abnormal [  ]  Reflexes: Symmetric [  ]  Abnormal [  ]    FUNCTIONAL STATUS:  Bed Mobility: Independent [  ]  Supervision [  ]  Needs Assistance [ x ]  N/A [  ]  Transfers: Independent [  ]  Supervision [  ]  Needs Assistance [ x ]  N/A [  ]   Ambulation: Independent [  ]  Supervision [  ]  Needs Assistance [x  ]  N/A [  ]  ADL: Independent [  ] Requires Assistance [  ] N/A [  ]      LABS:                        13.3   5.77  )-----------( 275      ( 2019 05:03 )             40.4     -09    141  |  104  |  23<H>  ----------------------------<  82  4.3   |  19  |  0.6<L>    Ca    9.2      2019 05:03  Mg     2.0     -            RADIOLOGY & ADDITIONAL STUDIES:    Assesment:

## 2019-01-09 NOTE — PHYSICAL THERAPY INITIAL EVALUATION ADULT - GENERAL OBSERVATIONS, REHAB EVAL
[id number] pm. patient rec'd/left in bed, + lidocaine pain patch over RT lower lumbar area; mother present. patient reports muscular-like pain in bilateral lower lumbar areas, buttocks and LE's (thighs,legs,feet). patient instructed in avoiding Valsalva and to exhale on exertion.

## 2019-01-09 NOTE — CONSULT NOTE ADULT - CONSULT REASON
sah
HTN
Papilledema
SAH , transfer from [facility]
Severe Headache with evidence of SAH
r/o preeclampsia
SAH

## 2019-01-09 NOTE — CONSULT NOTE ADULT - ASSESSMENT
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 currently off drips , on PO antihypertensive Medications    Gestational HTN : r/o pre eclampsia ( due to severe H/A during pregnancy) ? complicated by SAH                currently on labetalol  less likely long standing ( primary essential HTN)  : no evidence of LVH on 2d echo   SAH : on seizure prophylaxis and nimodipine to prevent spasm  ? 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 currently off drips , on PO antihypertensive Medications    Gestational HTN : r/o pre eclampsia ( due to severe H/A during pregnancy) ? complicated by SAH                currently on labetalol  less likely long standing ( primary essential HTN)  : no evidence of LVH on 2d echo   SAH : on seizure prophylaxis and nimodipine to prevent spasm  ? cerebral venous thrombosis : f/u with neuro for repeat imaging    plan:   continue with labetalol po , can add hydralazine po if needed  c/w seizure prophylaxis and nimodipine to prevent vasospasm  avoid NSAID and steroids as it may worsen the BP  f/u with neuro for further evaluation  if BP remains uncontrolled despite medical therpay , further evaluation for secondary HTN should be initiated to r/o pheo/ RONEL but doubt at this point  Outpt f/up with Dr. Burnett

## 2019-01-09 NOTE — PROGRESS NOTE ADULT - ASSESSMENT
33 year old woman  presented 2 days post partum with a history of gestational HTN, PCOS and asthma transferred from [facility] for management of SAH.  CTH at [facility] showed SAH and was transferred to [facility]. 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 MRI was performed due to the patients complaint of lower back pain. Patient's exam still remains non focal, she feels significant improvement in compare to the first day of admission here.    Plan:   - Continue BP control. Keep (SBP <140)  - Continue nimodopine for minimum of 14 days possibly 21 days.   - MRI Lumbar Spine w/o NICOLE results pending   - PT/OT ambulate    Neuro Attending note will follow.

## 2019-01-09 NOTE — CONSULT NOTE ADULT - SUBJECTIVE AND OBJECTIVE BOX
HPI:  32 y/o female   with hx of gestational hypertension, PCOS and asthma transferred from [facility] for management of SAH. As per patient she developed severe headache since 3:30pm on day of presentation. Headache started while patient laying down and described as sudden in onset , diffuse , 8/10 in intensity and progressively worsening since afternoon a/w photophobia , nausea but no vomiting. Headache worsens with head movements. CTH at [facility] showed SAH and patient was transferred to [facility] for further management.  pt was diagnosed with Gestational HTN and was started on Labetalol in december , she was induced for later on dec 27 , was uncomplicated and was d/c with labetalol and procardia due to uncrontolled BP . pt came back the hosp for the above reason.  she has been compliant with her medication. as per pt she was diagnosed with HTN in her previous pregnancy.  she denied any uncontrolled BP in between pregnancies or before , no Hx of H/A , blurred vision , palpitations , weight loss , drug use.  A diagnostic cerebral angiogram was done and failed to reveal significant vascular abnormality.MRI brain revealed a 1 cm left temporal superficial cavernoma which may have bled into the sulcus causing SAH as per neuro.    She was on a Cardene drip for blood pressure management, she was eventually transitioned to oral antihypertensives.  Over the weekend her headache worsened, CTH revealed improved SAH and CT perfusion failed to reveal perfusion abnormality.  currently she is on labetalol , adn nimodipine. BP is controlled SBP <140.  PAST MEDICAL & SURGICAL HISTORY  Gestational hypertension  Asthma  PCOS (polycystic ovarian syndrome)      FAMILY HISTORY:  FAMILY HISTORY:  Family history of hypertension (Mother)  Family history of high cholesterol (Father)      SOCIAL HISTORY:  []smoker  []Alcohol  []Drug    ALLERGIES:  Alcohol (Flushing)  Tamiflu (Unknown)      MEDICATIONS:  MEDICATIONS  (STANDING):  chlorhexidine 4% Liquid 1 Application(s) Topical <User Schedule>  dexamethasone     Tablet 4 milliGRAM(s) Oral daily  docusate sodium 100 milliGRAM(s) Oral three times a day  labetalol 300 milliGRAM(s) Oral every 6 hours  levETIRAcetam 750 milliGRAM(s) Oral two times a day  lidocaine   Patch 1 Patch Transdermal daily  magnesium oxide 400 milliGRAM(s) Oral three times a day with meals  multivitamin 1 Tablet(s) Oral daily  niMODipine 30 milliGRAM(s) Oral every 2 hours  pantoprazole    Tablet 40 milliGRAM(s) Oral before breakfast  senna 2 Tablet(s) Oral at bedtime    MEDICATIONS  (PRN):  acetaminophen   Tablet .. 650 milliGRAM(s) Oral every 4 hours PRN Moderate Pain (4 - 6)  hydrALAZINE Injectable 10 milliGRAM(s) IV Push every 6 hours PRN Hypertension  ibuprofen  Tablet. 600 milliGRAM(s) Oral every 6 hours PRN Moderate Pain (4 - 6)      HOME MEDICATIONS:  Home Medications:      VITALS:   T(F): 97.6 ( @ 19:26), Max: 98.9 ( @ 12:00)  HR: 69 ( @ 19:26) (58 - 92)  BP: 141/88 ( @ 19:26) (99/54 - 166/101)  BP(mean): 108 ( @ 17:00) (64 - 130)  RR: 20 ( @ 19:26) (9 - 38)  SpO2: 97% ( @ 17:54) (96% - 100%)    I&O's Summary    2019 07:01  -  2019 07:00  --------------------------------------------------------  IN: 1000 mL / OUT: 300 mL / NET: 700 mL        REVIEW OF SYSTEMS:  EYES/ENT: No visual changes;  No vertigo or throat pain   RESPIRATORY: No cough, wheezing, hemoptysis; No shortness of breath  CARDIOVASCULAR: No chest pain or palpitations  GASTROINTESTINAL: No abdominal or epigastric pain. No nausea, vomiting, or hematemesis; No diarrhea or constipation. No melena or hematochezia.  GENITOURINARY: No dysuria, frequency or hematuria      PHYSICAL EXAM:  NEURO: patient is awake , alert and oriented  GEN: Not in acute distress  NECK: no thyroid enlargement, no JVD  LUNGS: Clear to auscultation bilaterally   CARDIOVASCULAR: S1/S2 present, RRR , no murmurs or rubs  ABD: Soft, non-tender, non-distended, +BS  EXT: No JOHANNE  SKIN: Intact    LABS:                        13.3   5.77  )-----------( 275      ( 2019 05:03 )             40.4         141  |  104  |  23<H>  ----------------------------<  82  4.3   |  19  |  0.6<L>    Ca    9.2      2019 05:03  Mg     2.0      Chol 220<H> <H> HDL 55 Trig 247<H>      RADIOLOGY:  -CXR: < from: Xray Chest 1 View- PORTABLE-Routine (19 @ 05:00) >  Impression:      No lobar consolidation, effusion or pneumothorax.    < end of copied text >    -TTE: < from: Transthoracic Echocardiogram (18 @ 07:46) >   1. Left ventricular ejection fraction, by visual estimation, is 65 to   70%.   2. Normal global left ventricular systolic function.   3. Mild mitral regurgitation.   4. Mild tricuspid regurgitation.    < end of copied text >      < from: MR Head w/ IV Cont (19 @ 21:24) >  1.  1 cm left temporal superficial cavernoma. Consider the possibility   that the cavernoma bled into the adjacent sulcus, causing SAH    2.  Possible adjacent small developmental venous anomaly (correlation   with conventional angiogram )    3.  No evidence for cavernous sinus thrombosis    4.  Thinning of the right lateral transverse sinus. This is in an area   where there is an arachnoid granulation. (Correlation with CT scan).   Consider altered flow dynamics or stenosis, less likely intraluminal   thrombus    < end of copied text >    ECG:  Sinus , no signs of LVH HPI:  32 y/o female   with hx of gestational hypertension, PCOS and asthma transferred from [facility] for management of SAH. As per patient she developed severe headache since 3:30pm on day of presentation. Headache started while patient laying down and described as sudden in onset , diffuse , 8/10 in intensity and progressively worsening since afternoon a/w photophobia , nausea but no vomiting. Headache worsens with head movements. CTH at [facility] showed SAH and patient was transferred to [facility] for further management.  pt was diagnosed with Gestational HTN and was started on Labetalol in december , she was induced for later on dec 27 , was uncomplicated and was d/c with labetalol and procardia due to uncrontolled BP . pt came back the hosp for the above reason.  she has been compliant with her medication. as per pt she was diagnosed with HTN in her previous pregnancy.  she denied any uncontrolled BP in between pregnancies or before , no Hx of H/A , blurred vision , palpitations , weight loss , drug use.  A diagnostic cerebral angiogram was done and failed to reveal significant vascular abnormality.MRI brain revealed a 1 cm left temporal superficial cavernoma which may have bled into the sulcus causing SAH as per neuro.    She was on a Cardene drip for blood pressure management, she was eventually transitioned to oral antihypertensives.  Over the weekend her headache worsened, CTH revealed improved SAH and CT perfusion failed to reveal perfusion abnormality.  currently she is on labetalol , adn nimodipine. BP is controlled SBP <140.  PAST MEDICAL & SURGICAL HISTORY  Gestational hypertension  Asthma  PCOS (polycystic ovarian syndrome)      FAMILY HISTORY:  FAMILY HISTORY:  Family history of hypertension (Mother)  Family history of high cholesterol (Father)      SOCIAL HISTORY:  [no]smoker  [no]Alcohol  []Drug    ALLERGIES:  Alcohol (Flushing)  Tamiflu (Unknown)      MEDICATIONS:  MEDICATIONS  (STANDING):  chlorhexidine 4% Liquid 1 Application(s) Topical <User Schedule>  dexamethasone     Tablet 4 milliGRAM(s) Oral daily  docusate sodium 100 milliGRAM(s) Oral three times a day  labetalol 300 milliGRAM(s) Oral every 6 hours  levETIRAcetam 750 milliGRAM(s) Oral two times a day  lidocaine   Patch 1 Patch Transdermal daily  magnesium oxide 400 milliGRAM(s) Oral three times a day with meals  multivitamin 1 Tablet(s) Oral daily  niMODipine 30 milliGRAM(s) Oral every 2 hours  pantoprazole    Tablet 40 milliGRAM(s) Oral before breakfast  senna 2 Tablet(s) Oral at bedtime    MEDICATIONS  (PRN):  acetaminophen   Tablet .. 650 milliGRAM(s) Oral every 4 hours PRN Moderate Pain (4 - 6)  hydrALAZINE Injectable 10 milliGRAM(s) IV Push every 6 hours PRN Hypertension  ibuprofen  Tablet. 600 milliGRAM(s) Oral every 6 hours PRN Moderate Pain (4 - 6)      HOME MEDICATIONS:  Home Medications:      VITALS:   T(F): 97.6 ( @ 19:26), Max: 98.9 ( @ 12:00)  HR: 69 ( @ 19:26) (58 - 92)  BP: 141/88 ( @ 19:26) (99/54 - 166/101)  BP(mean): 108 ( @ 17:00) (64 - 130)  RR: 20 ( @ 19:26) (9 - 38)  SpO2: 97% ( @ 17:54) (96% - 100%)    I&O's Summary    2019 07:01  -  2019 07:00  --------------------------------------------------------  IN: 1000 mL / OUT: 300 mL / NET: 700 mL        REVIEW OF SYSTEMS:  EYES/ENT: No visual changes;  No vertigo or throat pain   RESPIRATORY: No cough, wheezing, hemoptysis; No shortness of breath  CARDIOVASCULAR: No chest pain or palpitations  GASTROINTESTINAL: No abdominal or epigastric pain. No nausea, vomiting, or hematemesis; No diarrhea or constipation. No melena or hematochezia.  GENITOURINARY: No dysuria, frequency or hematuria  Otherwise unrem      PHYSICAL EXAM:  NEURO: patient is awake , alert and oriented  GEN: Not in acute distress  HEENT: normal mucosa  NECK: no thyroid enlargement, no JVD  LUNGS: Clear to auscultation bilaterally   CARDIOVASCULAR: S1/S2 present, RRR , no murmurs or rubs  ABD: Soft, non-tender, non-distended, +BS  EXT/MS: No JOHANNE  SKIN: Intact    LABS:                        13.3   5.77  )-----------( 275      ( 2019 05:03 )             40.4         141  |  104  |  23<H>  ----------------------------<  82  4.3   |  19  |  0.6<L>    Ca    9.2      2019 05:03  Mg     2.0      Chol 220<H> <H> HDL 55 Trig 247<H>      RADIOLOGY:  -CXR: < from: Xray Chest 1 View- PORTABLE-Routine (19 @ 05:00) >  Impression:      No lobar consolidation, effusion or pneumothorax.    < end of copied text >    -TTE: < from: Transthoracic Echocardiogram (18 @ 07:46) >   1. Left ventricular ejection fraction, by visual estimation, is 65 to   70%.   2. Normal global left ventricular systolic function.   3. Mild mitral regurgitation.   4. Mild tricuspid regurgitation.    < end of copied text >      < from: MR Head w/ IV Cont (19 @ 21:24) >  1.  1 cm left temporal superficial cavernoma. Consider the possibility   that the cavernoma bled into the adjacent sulcus, causing SAH    2.  Possible adjacent small developmental venous anomaly (correlation   with conventional angiogram )    3.  No evidence for cavernous sinus thrombosis    4.  Thinning of the right lateral transverse sinus. This is in an area   where there is an arachnoid granulation. (Correlation with CT scan).   Consider altered flow dynamics or stenosis, less likely intraluminal   thrombus    < end of copied text >    ECG:  Sinus , no signs of LVH

## 2019-01-09 NOTE — PROGRESS NOTE ADULT - SUBJECTIVE AND OBJECTIVE BOX
BALDEV KEE 33y Female  MRN#: [id number]     SUBJECTIVE  Patient is a 33y old Female who presents with a chief complaint of headache post-partum, transfer from [facility] for SAH (09 Jan 2019 12:10)    Today is hospital day 10d, and this morning she is feeling okay, still complaining of headache and lower back/extremity pain but headache much improved from admission. Currently 2/10 in severity, no associated nausea/vomiting, photophobia/phonophobia. Lower back pain and extremity pain is main concern as of now - trying to get up and walk, increasing activity as tolerated. Denies any focal weakness, numbness/tingling in LE. Patient states her primary doctor Dr. Key is requesting cardiology Dr. Burnett to see her. Denies any chest pain, palpitations, SOB.     OBJECTIVE  PAST MEDICAL & SURGICAL HISTORY  Gestational hypertension  Asthma  PCOS (polycystic ovarian syndrome)    ALLERGIES:  Alcohol (Flushing)  Tamiflu (Unknown)    MEDICATIONS:  STANDING MEDICATIONS  chlorhexidine 4% Liquid 1 Application(s) Topical <User Schedule>  docusate sodium 100 milliGRAM(s) Oral three times a day  labetalol 300 milliGRAM(s) Oral every 6 hours  levETIRAcetam 750 milliGRAM(s) Oral two times a day  lidocaine   Patch 1 Patch Transdermal daily  magnesium oxide 400 milliGRAM(s) Oral three times a day with meals  multivitamin 1 Tablet(s) Oral daily  niMODipine 30 milliGRAM(s) Oral every 2 hours  pantoprazole    Tablet 40 milliGRAM(s) Oral before breakfast  senna 2 Tablet(s) Oral at bedtime  sodium chloride 0.9%. 1000 milliLiter(s) IV Continuous <Continuous>    PRN MEDICATIONS  acetaminophen   Tablet .. 650 milliGRAM(s) Oral every 4 hours PRN  hydrALAZINE Injectable 10 milliGRAM(s) IV Push every 6 hours PRN  ibuprofen  Tablet. 600 milliGRAM(s) Oral every 6 hours PRN      VITAL SIGNS: Last 24 Hours  T(C): 37.1 (09 Jan 2019 12:14), Max: 37.1 (09 Jan 2019 12:14)  T(F): 98.7 (09 Jan 2019 12:14), Max: 98.7 (09 Jan 2019 12:14)  HR: 68 (09 Jan 2019 12:14) (66 - 78)  BP: 143/88 (09 Jan 2019 12:14) (111/64 - 166/101)  BP(mean): 108 (08 Jan 2019 17:00) (80 - 123)  RR: 20 (09 Jan 2019 12:14) (16 - 38)  SpO2: 97% (08 Jan 2019 17:54) (97% - 100%)    LABS:                        13.3   5.77  )-----------( 275      ( 09 Jan 2019 05:03 )             40.4     01-09    141  |  104  |  23<H>  ----------------------------<  82  4.3   |  19  |  0.6<L>    Ca    9.2      09 Jan 2019 05:03  Mg     2.0     01-09                Culture - Urine (collected 08 Jan 2019 04:50)  Source: .Urine Clean Catch (Midstream)  Preliminary Report (09 Jan 2019 10:15):    10,000 - 49,000 CFU/mL Enterococcus faecalis          RADIOLOGY:      PHYSICAL EXAM:  GENERAL: NAD, well-developed, AAOx3  HEENT:  Atraumatic, Normocephalic. EOMI  PULMONARY: Clear to auscultation bilaterally; No wheezing or crackles  CARDIOVASCULAR: Regular rate and rhythm; No murmurs appreciated  GASTROINTESTINAL: Soft, Nondistended, mild soreness lower abdomen; Bowel sounds present  EXTREMITIES: 2+ Peripheral Pulses, No clubbing, cyanosis, or edema  NEUROLOGY: Strength 5/5 all four extremities. Sensation symmetric and intact

## 2019-01-09 NOTE — PROGRESS NOTE ADULT - SUBJECTIVE AND OBJECTIVE BOX
Neurology Progress Note    Interval History:    32 y/o female  who presents 2 days post partum with hx of gestational hypertension, PCOS and asthma transferred from [facility] for management of SAH. Patient examined at bedside just came back from MRI. She is still complaining of back pain, but is doing better over all. No acute overnight events.   FAMILY HISTORY:  Family history of hypertension (Mother)  Family history of high cholesterol (Father)  PAST MEDICAL & SURGICAL HISTORY:  Gestational hypertension  Asthma  PCOS (polycystic ovarian syndrome)  MEDICATIONS  (STANDING):  chlorhexidine 4% Liquid 1 Application(s) Topical <User Schedule>  dexamethasone     Tablet 4 milliGRAM(s) Oral daily  docusate sodium 100 milliGRAM(s) Oral three times a day  labetalol 300 milliGRAM(s) Oral every 6 hours  levETIRAcetam 750 milliGRAM(s) Oral two times a day  lidocaine   Patch 1 Patch Transdermal daily  magnesium oxide 400 milliGRAM(s) Oral three times a day with meals  multivitamin 1 Tablet(s) Oral daily  niMODipine 30 milliGRAM(s) Oral every 2 hours  pantoprazole    Tablet 40 milliGRAM(s) Oral before breakfast  senna 2 Tablet(s) Oral at bedtime  MEDICATIONS  (PRN):  acetaminophen   Tablet .. 650 milliGRAM(s) Oral every 4 hours PRN Moderate Pain (4 - 6)  hydrALAZINE Injectable 10 milliGRAM(s) IV Push every 6 hours PRN Hypertension  ibuprofen  Tablet. 600 milliGRAM(s) Oral every 6 hours PRN Moderate Pain (4 - 6)  Vital Signs Last 24 Hrs  T(C): 36.4 (2019 19:26), Max: 37.1 (2019 12:14)  T(F): 97.6 (2019 19:26), Max: 98.7 (2019 12:14)  HR: 69 (2019 19:26) (66 - 78)  BP: 141/88 (2019 19:26) (120/69 - 143/88)  BP(mean): --  RR: 20 (2019 19:26) (18 - 20)  SpO2: --  Neurological Exam:   Mental status: Awake, alert and oriented x3.  Recent and remote memory intact.  Attention/concentration intact.  No dysarthria, no aphasia.  Fund of knowledge appropriate.    Cranial nerves: PERRL, VFF, no nystagmus, EOMI, V1 through V3 intact bilaterally and symmetric, face symmetric, hearing intact to finger rub, palate elevation symmetric, tongue was midline.  Motor:  Moving all extremities No abnormal movements.    Sensation: Intact to light touch and pinprick.   Coordination: No dysmetria on finger-to-nose. No dysdiadokinesia.  NIH:   - LOC: 0  - Visual: 0  - Best Gaze: 0  - Facial: 0  - Motor Right Arm: 0  - Motor Left Arm: 0  - Motor Right Le  - Motor Left Le  - Limb Ataxia: 0  - Sensory: 0  - Best Language: 0  - Dysarthria: 0  - Extinction/Inattention: 0  TOTAL: 0  Labs:  CBC Full  -  ( 2019 05:03 )  WBC Count : 5.77 K/uL  Hemoglobin : 13.3 g/dL  Hematocrit : 40.4 %  Platelet Count - Automated : 275 K/uL  Mean Cell Volume : 86.7 fL  Mean Cell Hemoglobin : 28.5 pg  Mean Cell Hemoglobin Concentration : 32.9 g/dL  Auto Neutrophil # : 3.34 K/uL  Auto Lymphocyte # : 1.28 K/uL  Auto Monocyte # : 0.59 K/uL  Auto Eosinophil # : 0.47 K/uL  Auto Basophil # : 0.07 K/uL  Auto Neutrophil % : 58.0 %  Auto Lymphocyte % : 22.2 %  Auto Monocyte % : 10.2 %  Auto Eosinophil % : 8.1 %  Auto Basophil % : 1.2 %        141  |  104  |  23<H>  ----------------------------<  82  4.3   |  19  |  0.6<L>    Ca    9.2      2019 05:03  Mg     2.0

## 2019-01-09 NOTE — PROGRESS NOTE ADULT - SUBJECTIVE AND OBJECTIVE BOX
HPI:  32 y/o female  who presents 2 days post partum with hx of gestational hypertension, PCOS and asthma transferred from [facility] for management of SAH. As per patient she developed severe headache since 3:30pm on day of presentation. Headache started while patient laying down and described as sudden in onset , diffuse , 8/10 in intensity and progressively worsening since afternoon a/w photophobia , nausea but no vomiting. Headache worsens with head movements.  Pt was seen for follow up, doing better overall but with persistent lower back pain    PMH  Gestational hypertension  Asthma  PCOS (polycystic ovarian syndrome)      Acute Problems:  -persistent headache   -persistent low back pain , worse with ambulation and leaning forward  -Concern for vasospasm.    MEDICATIONS  (STANDING):  chlorhexidine 4% Liquid 1 Application(s) Topical <User Schedule>  docusate sodium 100 milliGRAM(s) Oral three times a day  labetalol 300 milliGRAM(s) Oral every 6 hours  levETIRAcetam 750 milliGRAM(s) Oral two times a day  lidocaine   Patch 1 Patch Transdermal daily  magnesium oxide 400 milliGRAM(s) Oral three times a day with meals  multivitamin 1 Tablet(s) Oral daily  niMODipine 30 milliGRAM(s) Oral every 2 hours  pantoprazole    Tablet 40 milliGRAM(s) Oral before breakfast  senna 2 Tablet(s) Oral at bedtime  sodium chloride 0.9%. 1000 milliLiter(s) (100 mL/Hr) IV Continuous <Continuous>    Vital Signs Last 24 Hrs  T(C): 35.9 (2019 07:33), Max: 36.7 (2019 19:38)  T(F): 96.7 (2019 07:33), Max: 98.1 (2019 19:38)  HR: 72 (2019 10:01) (66 - 78)  BP: 120/73 (2019 10:01) (108/62 - 166/101)  BP(mean): 108 (2019 17:00) (77 - 123)  RR: 18 (2019 07:33) (16 - 38)  SpO2: 97% (2019 17:54) (97% - 100%)      Exam:  Awake []no[x]spontaneously[]occasionally[]to stimuli  AI [x]y[]n   Following commands:[x]y[]n  Oriented:[]0[]1[]2[x]3    Tracking:[x]y[]n  Language:intact    Pupils:  Right  3>2       Left    3>2              Corneal:       Gag reflex:      EOMI:intact    LOC:   0   Questions: 0      Commands: 0   VF:    0   Gaze:  0   Facial:      0  RUE:  0 RLE:0    LUE:   0  LLE:0  Ataxia:            0  Sensory:0  Language:     0     Dysarthria:0  Extinction:0    NIHSS:0    GCS: 15      Hunt&Balbuena:2   m-Al: 2

## 2019-01-09 NOTE — CONSULT NOTE ADULT - REASON FOR ADMISSION
SAH , transfer from [facility]
SAH , transfer from [facility]
SAH , transfer from [facility]
SAH , transfer from [facility]
SAH , transfer from [facility]
SAH , transfer from [facility]

## 2019-01-09 NOTE — CONSULT NOTE ADULT - ASSESSMENT
IMPRESSION: Rehab of SAH / LBP    PRECAUTIONS: [  ] Cardiac  [  ] Respiratory  [  ] Seizures [  ] Contact Isolation  [  ] Droplet Isolation  [  ] Other    Weight Bearing Status:     RECOMMENDATION:    Out of Bed to Chair     DVT/Decubiti Prophylaxis    REHAB PLAN:     [ x  ] Bedside P/T 3-5 times a week   [x   ]   Bedside O/T  2-3 times a week             [   ] No Rehab Therapy Indicated                   [   ]  Speech Therapy   Conditioning/ROM                                    ADL  Bed Mobility                                               Conditioning/ROM  Transfers                                                     Bed Mobility  Sitting /Standing Balance                         Transfers                                        Gait Training                                               Sitting/Standing Balance  Stair Training [   ]Applicable                    Home equipment Eval                                                                        Splinting  [   ] Only      GOALS:   ADL   [ x  ]   Independent                    Transfers  [ x  ] Independent                          Ambulation  [ x  ] Independent     [  x  ] With device                            [   ]  CG                                                         [   ]  CG                                                                  [   ] CG                            [    ] Min A                                                   [   ] Min A                                                              [   ] Min  A          DISCHARGE PLAN:   [   ]  Good candidate for Intensive Rehabilitation/Hospital based                                             Will tolerate 3hrs Intensive Rehab Daily                                       [    ]  Short Term Rehab in Skilled Nursing Facility                                       [ x   ]  Home with Outpatient or VN services                                         [    ]  Possible Candidate for Intensive Hospital based Rehab

## 2019-01-09 NOTE — PROGRESS NOTE ADULT - ASSESSMENT
Impression:  33 year old woman  presented 2 days post partum with a history of gestational HTN, PCOS and asthma transferred from [facility] for management of SAH.  CTH at [facility] showed SAH and was transferred to [facility]. 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 OB/GYN believes the hypertension is not due to pre-eclampsia, or eclampsia. She was on a Cardene drip for blood pressure management, she was eventually transitioned to oral antihypertensives.  Over the weekend her headache worsened, CTH revealed improved SAH and CT perfusion failed to reveal perfusion abnormality.  Today her exam remains non focal, she feels significant improvement in compare to the first day of admission here. Patient was uneventful overnight but woke up this morning with a mild frontal headache but denies any associated symptoms.      Suggestions:  continue  Labetalol to 300 mg PO q6 hours.  continue  Nimodipine to 30 mg q2h.  May use hydralazine 10 mg IV PRN q6h for SBP >140.  Keep -140.  continue Tylenol 650mg q6h PRN for headache.  Discontinue Motrin   Continue Magnesium oxide 400 mg q8 hours.  Keep Magnesium level >2.   Continue Keppra.  Seizure precautions.  At this time diagnostic cerebral angiogram is not warranted.  continue Sequential stockings for  DVT prophylaxis   Ambulate 3 times a day with supervision.  Please Call Code Stroke if worsening of neurological symptoms. Impression:  33 year old woman  presented 2 days post partum with a history of gestational HTN, PCOS and asthma transferred from [facility] for management of SAH.  CTH at [facility] showed SAH and was transferred to [facility]. 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 OB/GYN believes the hypertension is not due to pre-eclampsia, or eclampsia. She was on a Cardene drip for blood pressure management, she was eventually transitioned to oral antihypertensives.  Over the weekend her headache worsened, CTH revealed improved SAH and CT perfusion failed to reveal perfusion abnormality.  Today her exam remains non focal, she feels significant improvement in compare to the first day of admission here. Patient was uneventful overnight but woke up this morning with a mild--modrate frontal headache but denies any associated symptoms.      Suggestions:  continue  Labetalol to 300 mg PO q6 hours.  continue  Nimodipine to 30 mg q2h.  May use hydralazine 10 mg IV PRN q6h for SBP >140.  Keep -140.  continue Tylenol 650mg q6h PRN for headache.  Discontinue Motrin   Continue Magnesium oxide 400 mg q8 hours.  Keep Magnesium level >2.   Continue Keppra 750mg bid  Seizure precautions.  At this time diagnostic cerebral angiogram is not warranted.  continue Sequential stockings for  DVT prophylaxis   Ambulate 3 times a day with supervision.  Please Call Code Stroke if worsening of neurological symptoms. Impression:  33 year old woman  presented 2 days post partum with a history of gestational HTN, PCOS and asthma transferred from [facility] for management of SAH.  CTH at [facility] showed SAH and was transferred to [facility]. 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 OB/GYN believes the hypertension is not due to pre-eclampsia, or eclampsia. She was on a Cardene drip for blood pressure management, she was eventually transitioned to oral antihypertensives.  Over the weekend her headache worsened, CTH revealed improved SAH and CT perfusion failed to reveal perfusion abnormality.  Today her exam remains non focal, she feels significant improvement in compare to the first day of admission here. Patient was uneventful overnight but woke up this morning with a mild--modrate frontal headache but denies any associated symptoms. Low back pain persists.      Suggestions:  continue  Labetalol to 300 mg PO q6 hours.  continue  Nimodipine to 30 mg q2h.  May use hydralazine 10 mg IV PRN q6h for SBP >140.  Keep -140.  continue Tylenol 650mg q6h PRN for headache.  continue  Motrin   Continue Magnesium oxide 400 mg q8 hours.  N/C MRI Lumbar spine   Keep Magnesium level >2.   Continue Keppra 750mg bid  Seizure precautions.  At this time diagnostic cerebral angiogram is not warranted.  continue Sequential stockings for  DVT prophylaxis   Ambulate 3 times a day with supervision.  Please Call Code Stroke if worsening of neurological symptoms. Impression:  33 year old woman  presented 2 days post partum with a history of gestational HTN, PCOS and asthma transferred from [facility] for management of SAH.  CTH at [facility] showed SAH and was transferred to [facility]. 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 She was on a Cardene drip for blood pressure management, she was eventually transitioned to oral antihypertensives.  Over the weekend her headache worsened, CTH revealed improved SAH and CT perfusion failed to reveal perfusion abnormality.  Today her exam remains non focal, she feels significant improvement in compare to the first day of admission here. Patient was uneventful overnight but woke up this morning with a mild--modrate frontal headache but denies any associated symptoms. Low back pain persists.      Suggestions:  Please obtain lumbar spine MRI to r/o any vascular malformation or blood around the spinal area causing the lower back and legs pain.  continue  Labetalol to 300 mg PO q6 hours.  continue  Nimodipine 30 mg q2h.  May use hydralazine 10 mg IV PRN q6h for SBP >140.  Keep -140.  continue Tylenol 650mg q6h PRN for headache.  may continue  Motrin prn.   Continue Magnesium oxide 400 mg q8 hours.  N/C MRI Lumbar spine   Keep Magnesium level >2.   Continue Keppra 750mg bid  Seizure precautions.  continue Sequential stockings for  DVT prophylaxis   Ambulate 3 times a day with supervision.  Please Call Code Stroke if worsening of neurological symptoms.  Neurological management per Stroke Team.  Medical Management per Medical team.

## 2019-01-09 NOTE — PROGRESS NOTE ADULT - ATTENDING COMMENTS
Patient seen and examined agree with above except as noted.  Patient feeling better post steroid yesterday.  Back pain improved and headache improved.  BP stable with no pushes of hydralazine needed.    MRI LS spine pending    A/P  Patient with SAH likely from ruptured cavernoma.  Back pain likely related to L4/L5 disc herniation and being non ambulatory for ~1 week.  1. OK to dc later today if stable  2. Continue nimodopine 60mg Q4 hours  3. BP medication  4. Patient should track BP at home  5. Glc control per medical team  6. F/u with Dr. Rider next week (appt given to patient)

## 2019-01-09 NOTE — PROGRESS NOTE ADULT - ATTENDING COMMENTS
Patient seen and examined with PA during rounds and case discussed with medical team during rounds.  Patient having lower back pain.  BP stable since yesterday after 5pm    Plan as above (keep SBP <140)

## 2019-01-09 NOTE — PROGRESS NOTE ADULT - ASSESSMENT
ADMISSION SUMMARY  32 y/o female  w/ PMH of Gestational HTN, PCOS and asthma transferred from [facility] for management of SAH with CTH showing small SAH in left frontotemporal region.    ASSESSMENT & PLAN    # SAH s/p diagnostic cerebral angiogram   - CTA head and neck (): negative for any large vessel occlusion or significant stenosis  - MRV (): Concerning for venous sinus thrombosis  - MR Head (): Left temporal superficial cavernoma  - CT perfusion (): Areas of relative ischemia, unknown clinical significance   - CT head (): Subarachnoid hemorrhage nearly resolved  - As per neuro, target -140  - Continue with labetolol 300 mg PO q6h and hydralazine IV 10 mg q6h PRN if SBP>140  - Continue nimodipine 30 mg PO q2h for now (will need nimodipine for 3 weeks as per neuro) and NS @ 100cc/hr to prevent vasospasm  - Continue PO magnesium 400 mg PO q8h (s/p drip)  - Continue Keppra 750 mg q12h for seizure ppx  - Continue Tylenol/Ibuprofen for pain control  - Continue neuro checks q4h, check BMP, Mg   - F/u with Dr. Rider if any further plan for angiogram as per Dr. Gamino    # Hypertension - improved control (maintain -140)  - Less likely to be eclampsia as >24h post-partum (seen by OB)  - US kidney negative  - Continue with labetolol 300 mg PO q6h and hydralazine IV 10 mg q6h PRN if SBP>140  - Will consider switching to long-acting maintenance anti-HTN medication if sustained    # LBP and b/l LE pain likely 2/2 reabsorption of blood pro prolonged immobilization/post-partum  - XR L-spine negative, LE duplex negative  - Possible 2/2 reabsorption of blood products along spinal column as per Dr. Gamino   - Rec MRI L-spine without contrast to evaluate for radiculitis  - Steroids could be beneficial for symptomatic relief  - Continue lidocaine patch for pain control    # Fever r/o infection - resolved  - Fever to 101.4 F on 1/3/19 Urine culture contaminated, blood culture no growth  - Completed Rocephin 1g q24h (started ) for 5d    # GI ppx   -Diet: DASH/TLC   -Protonix 40 mg daily    # DVT ppx  -SCD's    # Disposition  -From home. PT eval, increase activity as tolerated while also keeping SBP<140  -Code status: FULL CODE ADMISSION SUMMARY  34 y/o female  w/ PMH of Gestational HTN, PCOS and asthma transferred from [facility] [place] for management of SAH with CTH showing small SAH in left frontotemporal region.    ASSESSMENT & PLAN    # SAH s/p diagnostic cerebral angiogram   - CTA head and neck (): negative for any large vessel occlusion or significant stenosis  - MRV (): Concerning for venous sinus thrombosis  - MR Head (): Left temporal superficial cavernoma  - CT perfusion (): Areas of relative ischemia, unknown clinical significance   - CT head (): Subarachnoid hemorrhage nearly resolved  - As per neuro, target -140  - Continue with labetolol 300 mg PO q6h and hydralazine IV 10 mg q6h PRN if SBP>140  - Continue nimodipine 30 mg PO q2h for now (will need nimodipine for 3 weeks as per neuro) and NS @ 100cc/hr to prevent vasospasm  - Continue PO magnesium 400 mg PO q8h (s/p drip)  - Continue Keppra 750 mg q12h for seizure ppx  - Continue Tylenol/Ibuprofen for pain control  - Continue neuro checks q4h, check BMP, Mg   - F/u with Dr. Rider if any further plan for angiogram as per Dr. Gamino    # Hypertension - improved control (maintain -140)  - Less likely to be eclampsia as >24h post-partum (seen by OB)  - US kidney negative  - Continue with labetolol 300 mg PO q6h and hydralazine IV 10 mg q6h PRN if SBP>140  - Will consider switching to long-acting maintenance anti-HTN medication if sustained    # LBP and b/l LE pain likely 2/2 reabsorption of blood products post prolonged immobilization/post-partum  - XR L-spine negative, LE duplex negative  - Possible 2/2 reabsorption of blood products along spinal column as per Dr. Gamino   - Rec MRI L-spine without contrast to evaluate for radiculitis  - Steroids could be beneficial for symptomatic relief  - Continue lidocaine patch for pain control    # Fever r/o infection - resolved  - Fever to 101.4 F on 1/3/19 Urine culture contaminated, blood culture no growth  - Completed Rocephin 1g q24h (started ) for 5d    # GI ppx   -Diet: DASH/TLC   -Protonix 40 mg daily    # DVT ppx  -SCD's    # Disposition  -From home. PT eval, increase activity as tolerated while also keeping SBP<140  -Code status: FULL CODE

## 2019-01-09 NOTE — PROGRESS NOTE ADULT - ASSESSMENT
Impression:  33 year old woman  presented 2 days post partum with a history of gestational HTN, PCOS and asthma transferred from [facility] for management of SAH.  CTH at [facility] showed SAH and was transferred to [facility]. 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 OB/GYN believes the hypertension is not due to pre-eclampsia, or eclampsia. She was on a Cardene drip for blood pressure management, she was eventually transitioned to oral antihypertensives.  Over the weekend her headache worsened, CTH revealed improved SAH and CT perfusion failed to reveal perfusion abnormality.  Today her exam remains non focal, she feels significant improvement in compare to the first day of admission here.    Plan:  Continue BP control, begin contemplating longer acting medications  MRI Lumbar Spine Impression:  33 year old woman  presented 2 days post partum with a history of gestational HTN, PCOS and asthma transferred from [facility] for management of SAH.  CTH at [facility] showed SAH and was transferred to [facility]. 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 OB/GYN believes the hypertension is not due to pre-eclampsia, or eclampsia. She was on a Cardene drip for blood pressure management, she was eventually transitioned to oral antihypertensives.  Over the weekend her headache worsened, CTH revealed improved SAH and CT perfusion failed to reveal perfusion abnormality.  Today her exam remains non focal, she feels significant improvement in compare to the first day of admission here.    Plan:  Continue BP control, begin contemplating longer acting medications  MRI Lumbar Spine w/o NICOLE  Continue nimodopine for minimum of 14 days possibly 21 days.  PT/OT ambulate

## 2019-01-09 NOTE — CHART NOTE - NSCHARTNOTEFT_GEN_A_CORE
Registered Dietitian Follow-Up     Patient Profile Reviewed                           Yes [x]   No []     Nutrition History Previously Obtained        Yes [x]  No []       Pertinent Subjective Information: Pt reports improved appetite/PO intake, receiving Ensure Enlives and Ensure Puddings and consuming them. Denies need for anything else at this time.      Pertinent Medical Interventions: 33 year old woman  presented 2 days post partum with a history of gestational HTN, PCOS and asthma transferred from [facility] for management of SAH.  CTH at [facility] showed SAH and was transferred to [facility].  OB/GYN believes the hypertension is not due to pre-eclampsia, or eclampsia. She was on a Cardene drip for blood pressure management, she was eventually transitioned to oral antihypertensives.  Over the weekend her headache worsened, CTH revealed improved SAH and CT perfusion failed to reveal perfusion abnormality.       Diet order: DASH/TLC, Ensure Enlive Daily, Ensure Pudding BID      Anthropometrics:  - Ht.  - Wt. 77.7 kg  vs 83.9 kg  - wt loss post partum likely   - %wt change  - BMI  - IBW     Pertinent Lab Data: : BUN 23, Cr 0.6     Pertinent Meds: multivit, mag oxide, docusate, pantoprazole, senna     Physical Findings:  - Appearance: alert/oriented; no edema  - GI function: +BM   - Tubes:  - Oral/Mouth cavity: No   - Skin: skin intact     Nutrition Requirements  Weight Used: 83.9 kg/ 59 kg IBW     Estimated Needs Ongoing from  assessment  9368-4805 kcal/day (MSJ x 1.1-1.2) BMI 28-30 considered. no BF.  55-65 g/day (1.1-1.3 g/kg of IBW) IBW was used but aim at higher end  Per ICU team     Nutrient Intake:        [] Previous Nutrition Diagnosis:            [] Ongoing          [] Resolved    [] No active nutrition diagnosis identified at this time     Nutrition Diagnostic #1  Problem:  Etiology:  Statement:     Nutrition Diagnostic #2  Problem:  Etiology:  Statement:     Nutrition Intervention      Goal/Expected Outcome:     Indicator/Monitoring: Registered Dietitian Follow-Up     Patient Profile Reviewed                           Yes [x]   No []     Nutrition History Previously Obtained        Yes [x]  No []       Pertinent Subjective Information: Pt reports improved appetite/PO intake, receiving Ensure Enlive and Ensure Puddings and consuming them. Denies need for anything else at this time.      Pertinent Medical Interventions: 33 year old woman  presented 2 days post partum with a history of gestational HTN, PCOS and asthma transferred from [facility] for management of SAH.  CTH at [facility] showed SAH and was transferred to [facility].  OB/GYN believes the hypertension is not due to pre-eclampsia, or eclampsia. She was on a Cardene drip for blood pressure management, she was eventually transitioned to oral antihypertensives.  Over the weekend her headache worsened, CTH revealed improved SAH and CT perfusion failed to reveal perfusion abnormality.       Diet order: DASH/TLC, Ensure Enlive Daily, Ensure Pudding BID      Anthropometrics:  - Ht.  - Wt. 77.7 kg  vs 83.9 kg  - wt loss post partum likely   - %wt change  - BMI  - IBW     Pertinent Lab Data: : BUN 23, Cr 0.6     Pertinent Meds: multivit, mag oxide, docusate, pantoprazole, senna     Physical Findings:  - Appearance: alert/oriented; no edema  - GI function: +BM   - Tubes:  - Oral/Mouth cavity: No   - Skin: skin intact     Nutrition Requirements  Weight Used: 83.9 kg/ 59 kg IBW     Estimated Needs Ongoing from  assessment  [id number] kcal/day (MSJ x 1.1-1.2) BMI 28-30 considered. no BF.  55-65 g/day (1.1-1.3 g/kg of IBW) IBW was used but aim at higher end  Per ICU team     Nutrient Intake: Meeting needs        [] Previous Nutrition Diagnosis: Inadequate oral intake (resolved)            [] Ongoing          [] Resolved     Nutrition Intervention: Meals and snacks, medical food supplements     Goal/Expected Outcome: PO 75% over next 7 days     Indicator/Monitoring: RD to monitor energy intake    RECS:  1) Continue current diet

## 2019-01-10 ENCOUNTER — TRANSCRIPTION ENCOUNTER (OUTPATIENT)
Age: 34
End: 2019-01-10

## 2019-01-10 VITALS
RESPIRATION RATE: 16 BRPM | HEART RATE: 68 BPM | DIASTOLIC BLOOD PRESSURE: 74 MMHG | SYSTOLIC BLOOD PRESSURE: 141 MMHG | OXYGEN SATURATION: 99 %

## 2019-01-10 PROBLEM — E28.2 POLYCYSTIC OVARIAN SYNDROME: Chronic | Status: ACTIVE | Noted: 2018-12-31

## 2019-01-10 PROBLEM — Z00.00 ENCOUNTER FOR PREVENTIVE HEALTH EXAMINATION: Status: ACTIVE | Noted: 2019-01-10

## 2019-01-10 PROBLEM — O13.9 GESTATIONAL [PREGNANCY-INDUCED] HYPERTENSION WITHOUT SIGNIFICANT PROTEINURIA, UNSPECIFIED TRIMESTER: Chronic | Status: ACTIVE | Noted: 2018-12-31

## 2019-01-10 PROBLEM — J45.909 UNSPECIFIED ASTHMA, UNCOMPLICATED: Chronic | Status: ACTIVE | Noted: 2018-12-31

## 2019-01-10 LAB
-  AMPICILLIN: SIGNIFICANT CHANGE UP
-  CIPROFLOXACIN: SIGNIFICANT CHANGE UP
-  LEVOFLOXACIN: SIGNIFICANT CHANGE UP
-  NITROFURANTOIN: SIGNIFICANT CHANGE UP
-  TETRACYCLINE: SIGNIFICANT CHANGE UP
-  VANCOMYCIN: SIGNIFICANT CHANGE UP
ANION GAP SERPL CALC-SCNC: 19 MMOL/L — HIGH (ref 7–14)
BASOPHILS # BLD AUTO: 0.05 K/UL — SIGNIFICANT CHANGE UP (ref 0–0.2)
BASOPHILS NFR BLD AUTO: 0.7 % — SIGNIFICANT CHANGE UP (ref 0–1)
BUN SERPL-MCNC: 24 MG/DL — HIGH (ref 10–20)
CALCIUM SERPL-MCNC: 9.6 MG/DL — SIGNIFICANT CHANGE UP (ref 8.5–10.1)
CHLORIDE SERPL-SCNC: 99 MMOL/L — SIGNIFICANT CHANGE UP (ref 98–110)
CO2 SERPL-SCNC: 21 MMOL/L — SIGNIFICANT CHANGE UP (ref 17–32)
CREAT SERPL-MCNC: 0.6 MG/DL — LOW (ref 0.7–1.5)
CULTURE RESULTS: SIGNIFICANT CHANGE UP
EOSINOPHIL # BLD AUTO: 0.05 K/UL — SIGNIFICANT CHANGE UP (ref 0–0.7)
EOSINOPHIL NFR BLD AUTO: 0.7 % — SIGNIFICANT CHANGE UP (ref 0–8)
GLUCOSE SERPL-MCNC: 92 MG/DL — SIGNIFICANT CHANGE UP (ref 70–99)
HCT VFR BLD CALC: 40.9 % — SIGNIFICANT CHANGE UP (ref 37–47)
HGB BLD-MCNC: 13.4 G/DL — SIGNIFICANT CHANGE UP (ref 12–16)
IMM GRANULOCYTES NFR BLD AUTO: 0.5 % — HIGH (ref 0.1–0.3)
LYMPHOCYTES # BLD AUTO: 1.36 K/UL — SIGNIFICANT CHANGE UP (ref 1.2–3.4)
LYMPHOCYTES # BLD AUTO: 17.8 % — LOW (ref 20.5–51.1)
MAGNESIUM SERPL-MCNC: 2.1 MG/DL — SIGNIFICANT CHANGE UP (ref 1.8–2.4)
MCHC RBC-ENTMCNC: 28 PG — SIGNIFICANT CHANGE UP (ref 27–31)
MCHC RBC-ENTMCNC: 32.8 G/DL — SIGNIFICANT CHANGE UP (ref 32–37)
MCV RBC AUTO: 85.4 FL — SIGNIFICANT CHANGE UP (ref 81–99)
METHOD TYPE: SIGNIFICANT CHANGE UP
MONOCYTES # BLD AUTO: 0.36 K/UL — SIGNIFICANT CHANGE UP (ref 0.1–0.6)
MONOCYTES NFR BLD AUTO: 4.7 % — SIGNIFICANT CHANGE UP (ref 1.7–9.3)
NEUTROPHILS # BLD AUTO: 5.78 K/UL — SIGNIFICANT CHANGE UP (ref 1.4–6.5)
NEUTROPHILS NFR BLD AUTO: 75.6 % — HIGH (ref 42.2–75.2)
NRBC # BLD: 0 /100 WBCS — SIGNIFICANT CHANGE UP (ref 0–0)
ORGANISM # SPEC MICROSCOPIC CNT: SIGNIFICANT CHANGE UP
ORGANISM # SPEC MICROSCOPIC CNT: SIGNIFICANT CHANGE UP
PLATELET # BLD AUTO: 319 K/UL — SIGNIFICANT CHANGE UP (ref 130–400)
POTASSIUM SERPL-MCNC: 4.4 MMOL/L — SIGNIFICANT CHANGE UP (ref 3.5–5)
POTASSIUM SERPL-SCNC: 4.4 MMOL/L — SIGNIFICANT CHANGE UP (ref 3.5–5)
RBC # BLD: 4.79 M/UL — SIGNIFICANT CHANGE UP (ref 4.2–5.4)
RBC # FLD: 12.7 % — SIGNIFICANT CHANGE UP (ref 11.5–14.5)
SODIUM SERPL-SCNC: 139 MMOL/L — SIGNIFICANT CHANGE UP (ref 135–146)
SPECIMEN SOURCE: SIGNIFICANT CHANGE UP
WBC # BLD: 7.64 K/UL — SIGNIFICANT CHANGE UP (ref 4.8–10.8)
WBC # FLD AUTO: 7.64 K/UL — SIGNIFICANT CHANGE UP (ref 4.8–10.8)

## 2019-01-10 RX ORDER — OXYCODONE HYDROCHLORIDE 5 MG/1
1 TABLET ORAL
Qty: 15 | Refills: 0
Start: 2019-01-10

## 2019-01-10 RX ORDER — LABETALOL HCL 100 MG
1 TABLET ORAL
Qty: 56 | Refills: 0
Start: 2019-01-10 | End: 2019-01-23

## 2019-01-10 RX ORDER — LEVETIRACETAM 250 MG/1
1 TABLET, FILM COATED ORAL
Qty: 16 | Refills: 0
Start: 2019-01-10 | End: 2019-01-17

## 2019-01-10 RX ORDER — MAGNESIUM OXIDE 400 MG ORAL TABLET 241.3 MG
1 TABLET ORAL
Qty: 42 | Refills: 0
Start: 2019-01-10 | End: 2019-01-23

## 2019-01-10 RX ORDER — LABETALOL HCL 100 MG
1 TABLET ORAL
Qty: 0 | Refills: 0 | DISCHARGE
Start: 2019-01-10 | End: 2019-01-23

## 2019-01-10 RX ORDER — NIMODIPINE 60 MG/10ML
60 SOLUTION ORAL EVERY 4 HOURS
Qty: 0 | Refills: 0 | Status: DISCONTINUED | OUTPATIENT
Start: 2019-01-10 | End: 2019-01-10

## 2019-01-10 RX ORDER — PANTOPRAZOLE SODIUM 20 MG/1
1 TABLET, DELAYED RELEASE ORAL
Qty: 5 | Refills: 0
Start: 2019-01-10 | End: 2019-01-14

## 2019-01-10 RX ORDER — ACETAMINOPHEN 500 MG
2 TABLET ORAL
Qty: 0 | Refills: 0 | DISCHARGE
Start: 2019-01-10

## 2019-01-10 RX ORDER — NIMODIPINE 60 MG/10ML
2 SOLUTION ORAL
Qty: 120 | Refills: 0
Start: 2019-01-10 | End: 2019-01-19

## 2019-01-10 RX ADMIN — Medication 100 MILLIGRAM(S): at 05:59

## 2019-01-10 RX ADMIN — NIMODIPINE 30 MILLIGRAM(S): 60 SOLUTION ORAL at 07:51

## 2019-01-10 RX ADMIN — NIMODIPINE 30 MILLIGRAM(S): 60 SOLUTION ORAL at 06:00

## 2019-01-10 RX ADMIN — NIMODIPINE 60 MILLIGRAM(S): 60 SOLUTION ORAL at 15:16

## 2019-01-10 RX ADMIN — LIDOCAINE 1 PATCH: 4 CREAM TOPICAL at 00:03

## 2019-01-10 RX ADMIN — CHLORHEXIDINE GLUCONATE 1 APPLICATION(S): 213 SOLUTION TOPICAL at 06:00

## 2019-01-10 RX ADMIN — Medication 600 MILLIGRAM(S): at 10:42

## 2019-01-10 RX ADMIN — Medication 4 MILLIGRAM(S): at 05:59

## 2019-01-10 RX ADMIN — NIMODIPINE 30 MILLIGRAM(S): 60 SOLUTION ORAL at 02:04

## 2019-01-10 RX ADMIN — NIMODIPINE 60 MILLIGRAM(S): 60 SOLUTION ORAL at 11:16

## 2019-01-10 RX ADMIN — MAGNESIUM OXIDE 400 MG ORAL TABLET 400 MILLIGRAM(S): 241.3 TABLET ORAL at 17:12

## 2019-01-10 RX ADMIN — NIMODIPINE 30 MILLIGRAM(S): 60 SOLUTION ORAL at 04:03

## 2019-01-10 RX ADMIN — Medication 600 MILLIGRAM(S): at 04:03

## 2019-01-10 RX ADMIN — NIMODIPINE 30 MILLIGRAM(S): 60 SOLUTION ORAL at 00:02

## 2019-01-10 RX ADMIN — Medication 300 MILLIGRAM(S): at 11:16

## 2019-01-10 RX ADMIN — MAGNESIUM OXIDE 400 MG ORAL TABLET 400 MILLIGRAM(S): 241.3 TABLET ORAL at 11:18

## 2019-01-10 RX ADMIN — LIDOCAINE 1 PATCH: 4 CREAM TOPICAL at 11:16

## 2019-01-10 RX ADMIN — Medication 300 MILLIGRAM(S): at 00:02

## 2019-01-10 RX ADMIN — Medication 300 MILLIGRAM(S): at 05:59

## 2019-01-10 RX ADMIN — Medication 650 MILLIGRAM(S): at 16:25

## 2019-01-10 RX ADMIN — LEVETIRACETAM 750 MILLIGRAM(S): 250 TABLET, FILM COATED ORAL at 06:00

## 2019-01-10 RX ADMIN — LEVETIRACETAM 750 MILLIGRAM(S): 250 TABLET, FILM COATED ORAL at 18:36

## 2019-01-10 RX ADMIN — Medication 1 TABLET(S): at 11:17

## 2019-01-10 RX ADMIN — MAGNESIUM OXIDE 400 MG ORAL TABLET 400 MILLIGRAM(S): 241.3 TABLET ORAL at 07:52

## 2019-01-10 RX ADMIN — PANTOPRAZOLE SODIUM 40 MILLIGRAM(S): 20 TABLET, DELAYED RELEASE ORAL at 06:01

## 2019-01-10 RX ADMIN — Medication 650 MILLIGRAM(S): at 16:40

## 2019-01-10 RX ADMIN — Medication 300 MILLIGRAM(S): at 17:13

## 2019-01-10 NOTE — PROGRESS NOTE ADULT - SUBJECTIVE AND OBJECTIVE BOX
HPI:  34 y/o female  who presents 2 days post partum with hx of gestational hypertension, PCOS and asthma transferred from [facility] for management of SAH. As per patient she developed severe headache since 3:30pm on day of presentation. Headache started while patient laying down and described as sudden in onset , diffuse , 8/10 in intensity and progressively worsening since afternoon a/w photophobia , nausea but no vomiting. Headache worsens with head movements. CTH at [facility] showed SAH for which the neurosurgeon at [facility] Dr. Calderon discussed case with dr fontanez and patient was transferred to [facility] for possible intervention like coiling in am . No history of similar events in past        PMH  Gestational hypertension  Asthma  PCOS (polycystic ovarian syndrome)        Pertinent Medical History/Social History/Family History/other:     Social History: []  Drug Use: []   Alcohol Use: []   Tobacco Use:  [] Other:      Cerebrovascular Risk Factors:[] prior ischemic stroke  [] Afib  []CAD  [X]HTN  []DLD  []DM []PVD      Stroke Workup:    Cardiac Rhythm(Tele/Holter) & Duration:                   Events: none    Cardiac Structure:(TTE/SACHIN +/-):< from: Transthoracic Echocardiogram (12.31.18 @ 07:46) >< from: Transthoracic Echocardiogram (12..18 @ 07:46) >  Summary:   1. Left ventricular ejection fraction, by visual estimation, is 65 to   70%.   2. Normal global left ventricular systolic function.   3. Mild mitral regurgitation.   4. Mild tricuspid regurgitation.    < end of copied text >          Home Medications:      MEDICATIONS  (STANDING):  chlorhexidine 4% Liquid 1 Application(s) Topical <User Schedule>  dexamethasone     Tablet 4 milliGRAM(s) Oral daily  docusate sodium 100 milliGRAM(s) Oral three times a day  labetalol 300 milliGRAM(s) Oral every 6 hours  levETIRAcetam 750 milliGRAM(s) Oral two times a day  lidocaine   Patch 1 Patch Transdermal daily  magnesium oxide 400 milliGRAM(s) Oral three times a day with meals  multivitamin 1 Tablet(s) Oral daily  niMODipine 60 milliGRAM(s) Oral every 4 hours  pantoprazole    Tablet 40 milliGRAM(s) Oral before breakfast  senna 2 Tablet(s) Oral at bedtime      Last 24 hour events:none    Physical Exam:  Patient a/o x 4, speech and language intact  cn: intact  power : 5/5 throughout, she was able to ambulate with assistance  ftn: no dysmetria  hks : No ataxia  sensory : intact  gait: steady with 1 person assist.    Vital Signs Last 24 Hrs  T(C): 36.7 (10 Bruce 2019 06:00), Max: 37.3 (2019 20:21)  T(F): 98 (10 Bruce 2019 06:00), Max: 99.2 (2019 20:21)  HR: 70 (10 Bruce 2019 06:00) (60 - 80)  BP: 130/78 (10 Bruce 2019 06:00) (120/69 - 144/69)  BP(mean): --  RR: 20 (10 Bruce 2019 06:00) (18 - 20)  SpO2: --    NIHSS  LOC: 0     QUES:  0   COMM:  0   VF:   0  GAZE:   0  RUE: 0    RLE: 0    LUE:  0   LLE:  0   SENS:  0   LAN  SPEECH: 0    ATAXIA:   0  NEGLECT:0     FACE:0    NIHSS 0  Hunt and Balbuena score : 1

## 2019-01-10 NOTE — DISCHARGE NOTE ADULT - PLAN OF CARE
Medical management and outpatient follow up Please continue medications as directed above. Follow with strict BP control, try to follow a low salt diet, and take nimodipine for a total 21 day course (last day 1/20/19). Follow up with Dr. Rider within one week for further management and to discuss results of EEG and possibly discontinue Keppra as you have taken 11 days while admitted to the hospital. You may take Tylenol as needed for pain control. As discussed, if headache suddenly worsens, you have associated nausea/vomiting, or blood pressure remains extremely uncontrolled, return to the ED immediately. Medical management Please taken medications as above every 6 hours and measure your blood pressure to keep  or below. Follow up with your primary doctor Dr. Myles within one week to adjust medications as needed or sooner if your BP is not under control. Also follow up with Dr. Burnett (cardiology) in 1-2 weeks. Pain control Please continue to take the steroids for 5 more days following discharge to reduce inflammation and help with pain control (1/11 - 1/15) as well as pantoprazole at the same time for any GI upset that may be caused by the steroids. Increase activity slowly as tolerated. Post-partum follow up Please follow up with your OBGYN doctor as scheduled. Avoid breastfeeding while on medications above.

## 2019-01-10 NOTE — DISCHARGE NOTE ADULT - MEDICATION SUMMARY - MEDICATIONS TO TAKE
I will START or STAY ON the medications listed below when I get home from the hospital:    predniSONE 20 mg oral tablet  -- 2 tab(s) by mouth once a day   -- It is very important that you take or use this exactly as directed.  Do not skip doses or discontinue unless directed by your doctor.  Obtain medical advice before taking any non-prescription drugs as some may affect the action of this medication.  Take with food or milk.    -- Indication: For Lower back pain    acetaminophen 325 mg oral tablet  -- 2 tab(s) by mouth every 4 hours, As needed, Moderate Pain (4 - 6)  -- Indication: For Pain control    levETIRAcetam 750 mg oral tablet  -- 1 tab(s) by mouth 2 times a day  -- Indication: For Seizure prophylaxis    labetalol 300 mg oral tablet  -- 1 tab(s) by mouth every 6 hours  -- Indication: For Hypertension    niMODipine 30 mg oral capsule  -- 2 cap(s) by mouth every 4 hours  -- Indication: For Cerebral vasospasm ppx    magnesium oxide 400 mg (241.3 mg elemental magnesium) oral tablet  -- 1 tab(s) by mouth 3 times a day (with meals)  -- Indication: For Neuroprotection    pantoprazole 40 mg oral delayed release tablet  -- 1 tab(s) by mouth once a day (before a meal)  -- Indication: For GI prophylaxis    Multiple Vitamins oral tablet  -- 1 tab(s) by mouth once a day  -- Indication: For Multivitamin

## 2019-01-10 NOTE — DISCHARGE NOTE ADULT - CARE PROVIDER_API CALL
Davion Rider), Neurology; Vascular Neurology  [address]  [address]  [place], [place] [id number]  Phone: [phone]  Fax: [phone]    Peter Myles), Family Medicine  [address] [place] [id number]  Phone: [phone]  Fax: [phone]    Orlando Burnett), Cardiovascular Disease; Internal Medicine  [address]  [address]  [place] [address]  Phone: [phone]  Fax: [phone]

## 2019-01-10 NOTE — OCCUPATIONAL THERAPY INITIAL EVALUATION ADULT - PERTINENT HX OF CURRENT PROBLEM, REHAB EVAL
Pt is a 34 y/o F, s/p vaginal delivery 11 days ago. Pt has a 1 yr old and  at home. Pt c/o right side lower back pain and neeta inner thigh pain at times. Pt reports that pain in BLE has minimally subsided

## 2019-01-10 NOTE — DISCHARGE NOTE ADULT - HOSPITAL COURSE
32 y/o female  w/ PMH of Gestational HTN, PCOS and asthma transferred from [facility] for management of SAH. As per patient she developed severe headache since 3:30pm on day of presentation. Headache started while patient laying down and described as sudden in onset , diffuse , 8/10 in intensity and progressively worsening since afternoon a/w photophobia , nausea but no vomiting. Headache worsens with head movements. CTH at [facility] showed SAH for which the neurosurgeon at [facility] Dr. Calderon discussed case with dr fontanez and patient was transferred to [facility] for possible intervention.     At [facility], pt underwent cerebral angiogram and started on nimodipine. Pt underwent MRV after and was found to have ?venous sinus thrombosis on MRV. As per neurology, it can be secondary to artifact. Neurology wanted a repeat MRI with IV contrast. 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 Seen by OB in ICU, doubted eclampsia given risk highest in the first 24hr postpartum. Pt continued to be on Mg and Nicardipine drip as per neuro to maintain Mg of 2-4 and SBP < 120. Pt was started Tylenol standing q4h to prevent headache. Patient remained in ICU for another day to wean off of nicardipine drip. Subsequently downgraded to stroke unit.     In stroke unit, headache much improved. Patient complaining of worsening LBP, XR negative, neuro recommending MRI L-spine to r/o radiculitis 2/2 reabsorption of blood products. Given steroids with some improvement in sx. MRI negative. Cleared by neuro Dr. Rider for d/c and outpatient follow. Explained to patients for strict instructions for BP control and continuing nimodipine for total 21 days. Patient's symptoms improved, minimal headache, LBP improved with steroids, ambulating with cane with minimal assistance with PT. Agreeable with d/c and outpatient follow up.

## 2019-01-10 NOTE — PROGRESS NOTE ADULT - PROVIDER SPECIALTY LIST ADULT
Critical Care
Internal Medicine
Internal Medicine
Neurology
Neurosurgery
Critical Care

## 2019-01-10 NOTE — OCCUPATIONAL THERAPY INITIAL EVALUATION ADULT - BATHING TRAINING, ASSISTIVE DEVICE, OT EVAL
Pt educated on availability of grab bar and shower chair if needed to inc ind with bathing/inc safety 2* c/o pain in lower back

## 2019-01-10 NOTE — PROGRESS NOTE ADULT - SUBJECTIVE AND OBJECTIVE BOX
BALDEV KEE 33y Female  MRN#: [id number]     SUBJECTIVE  Patient is a 33y old Female who presents with a chief complaint of headache post-partum, transfer from [facility] for SAH (09 Jan 2019 12:10)    Today is hospital day 11, and this morning she is feeling much better. Headache almost goney, no associated nausea/vomiting, photophobia/phonophobia. LBP is better. Denies any focal weakness, numbness/tingling in LE. Denies any chest pain, palpitations, SOB.     OBJECTIVE  PAST MEDICAL & SURGICAL HISTORY  Gestational hypertension  Asthma  PCOS (polycystic ovarian syndrome)    ALLERGIES:  Alcohol (Flushing)  Tamiflu (Unknown)        PHYSICAL EXAM:  GENERAL: NAD, well-developed, AAOx3  HEENT:  Atraumatic, Normocephalic. EOMI  PULMONARY: Clear to auscultation bilaterally; No wheezing or crackles  CARDIOVASCULAR: Regular rate and rhythm; No murmurs appreciated  GASTROINTESTINAL: Soft, Nondistended, mild soreness lower abdomen; Bowel sounds present  EXTREMITIES: 2+ Peripheral Pulses, No clubbing, cyanosis, or edema  NEUROLOGY: Strength 5/5 all four extremities. Sensation symmetric and intact    Tele - no events    ROS: Denies CP, SOB, AP  All other systems reviewed and are within normal limits except for the complaints above.    MEDICATIONS  (STANDING):  chlorhexidine 4% Liquid 1 Application(s) Topical <User Schedule>  dexamethasone     Tablet 4 milliGRAM(s) Oral daily  docusate sodium 100 milliGRAM(s) Oral three times a day  labetalol 300 milliGRAM(s) Oral every 6 hours  levETIRAcetam 750 milliGRAM(s) Oral two times a day  magnesium oxide 400 milliGRAM(s) Oral three times a day with meals  multivitamin 1 Tablet(s) Oral daily  niMODipine 60 milliGRAM(s) Oral every 4 hours  pantoprazole    Tablet 40 milliGRAM(s) Oral before breakfast  senna 2 Tablet(s) Oral at bedtime    MEDICATIONS  (PRN):  acetaminophen   Tablet .. 650 milliGRAM(s) Oral every 4 hours PRN Moderate Pain (4 - 6)  hydrALAZINE Injectable 10 milliGRAM(s) IV Push every 6 hours PRN Hypertension    Home Medications:    Vital Signs Last 24 Hrs  T(C): 36.7 (10 Bruce 2019 06:00), Max: 37.3 (09 Jan 2019 20:21)  T(F): 98 (10 Bruce 2019 06:00), Max: 99.2 (09 Jan 2019 20:21)  HR: 69 (10 Bruce 2019 12:00) (60 - 80)  BP: 137/75 (10 Bruce 2019 12:00) (120/69 - 158/92)  BP(mean): --  RR: 16 (10 Bruce 2019 12:00) (16 - 20)  SpO2: 95% (10 Bruce 2019 11:15) (95% - 95%)  CAPILLARY BLOOD GLUCOSE        LABS:                        13.4   7.64  )-----------( 319      ( 10 Bruce 2019 06:20 )             40.9     01-10    139  |  99  |  24<H>  ----------------------------<  92  4.4   |  21  |  0.6<L>    Ca    9.6      10 Bruce 2019 06:20  Mg     2.1     01-10                        Culture - Urine (collected 08 Jan 2019 04:50)  Source: .Urine Clean Catch (Midstream)  Final Report (10 Bruce 2019 11:16):    10,000 - 49,000 CFU/mL Enterococcus faecalis  Organism: Enterococcus faecalis (10 Bruce 2019 11:16)  Organism: Enterococcus faecalis (10 Bruce 2019 11:16)      Consultant(s) Notes Reviewed:  [x ] YES  [ ] NO  Care Discussed with Consultants/Other Providers/ Housestaff [ x] YES  [ ] NO  Radiology personally reviewed. BALDEV KEE 33y Female  MRN#: [id number]     SUBJECTIVE  Patient is a 33y old Female who presents with a chief complaint of headache post-partum, transfer from [facility] for SAH (09 Jan 2019 12:10)    Today is hospital day 11, and this morning she is feeling much better. Headache almost goney, no associated nausea/vomiting, photophobia/phonophobia. LBP is better. Denies any focal weakness, numbness/tingling in LE. Denies any chest pain, palpitations, SOB. Ambulating on her own.    OBJECTIVE  PAST MEDICAL & SURGICAL HISTORY  Gestational hypertension  Asthma  PCOS (polycystic ovarian syndrome)    ALLERGIES:  Alcohol (Flushing)  Tamiflu (Unknown)        PHYSICAL EXAM:  GENERAL: NAD, well-developed, AAOx3  HEENT:  Atraumatic, Normocephalic. EOMI  PULMONARY: Clear to auscultation bilaterally; No wheezing or crackles  CARDIOVASCULAR: Regular rate and rhythm; No murmurs appreciated  GASTROINTESTINAL: Soft, Nondistended, mild soreness lower abdomen; Bowel sounds present  EXTREMITIES: 2+ Peripheral Pulses, No clubbing, cyanosis, or edema  NEUROLOGY: Strength 5/5 all four extremities. Sensation symmetric and intact    Tele - no events    ROS: Denies CP, SOB, AP  All other systems reviewed and are within normal limits except for the complaints above.    MEDICATIONS  (STANDING):  chlorhexidine 4% Liquid 1 Application(s) Topical <User Schedule>  dexamethasone     Tablet 4 milliGRAM(s) Oral daily  docusate sodium 100 milliGRAM(s) Oral three times a day  labetalol 300 milliGRAM(s) Oral every 6 hours  levETIRAcetam 750 milliGRAM(s) Oral two times a day  magnesium oxide 400 milliGRAM(s) Oral three times a day with meals  multivitamin 1 Tablet(s) Oral daily  niMODipine 60 milliGRAM(s) Oral every 4 hours  pantoprazole    Tablet 40 milliGRAM(s) Oral before breakfast  senna 2 Tablet(s) Oral at bedtime    MEDICATIONS  (PRN):  acetaminophen   Tablet .. 650 milliGRAM(s) Oral every 4 hours PRN Moderate Pain (4 - 6)  hydrALAZINE Injectable 10 milliGRAM(s) IV Push every 6 hours PRN Hypertension    Home Medications:    Vital Signs Last 24 Hrs  T(C): 36.7 (10 Bruce 2019 06:00), Max: 37.3 (09 Jan 2019 20:21)  T(F): 98 (10 Bruce 2019 06:00), Max: 99.2 (09 Jan 2019 20:21)  HR: 69 (10 Bruce 2019 12:00) (60 - 80)  BP: 137/75 (10 Bruce 2019 12:00) (120/69 - 158/92)  BP(mean): --  RR: 16 (10 Bruce 2019 12:00) (16 - 20)  SpO2: 95% (10 Bruce 2019 11:15) (95% - 95%)  CAPILLARY BLOOD GLUCOSE        LABS:                        13.4   7.64  )-----------( 319      ( 10 Bruce 2019 06:20 )             40.9     01-10    139  |  99  |  24<H>  ----------------------------<  92  4.4   |  21  |  0.6<L>    Ca    9.6      10 Bruce 2019 06:20  Mg     2.1     01-10                        Culture - Urine (collected 08 Jan 2019 04:50)  Source: .Urine Clean Catch (Midstream)  Final Report (10 Bruce 2019 11:16):    10,000 - 49,000 CFU/mL Enterococcus faecalis  Organism: Enterococcus faecalis (10 Bruce 2019 11:16)  Organism: Enterococcus faecalis (10 Bruce 2019 11:16)      Consultant(s) Notes Reviewed:  [x ] YES  [ ] NO  Care Discussed with Consultants/Other Providers/ Housestaff [ x] YES  [ ] NO  Radiology personally reviewed.

## 2019-01-10 NOTE — OCCUPATIONAL THERAPY INITIAL EVALUATION ADULT - GENERAL OBSERVATIONS, REHAB EVAL
Pt seen 9:36-9:58 Pt encountered semi osborne in bed in NAD +tele monitor. Pt agreeable to OT [place].

## 2019-01-10 NOTE — OCCUPATIONAL THERAPY INITIAL EVALUATION ADULT - REHAB POTENTIAL, OT EVAL
Pt demonstrates good safety awareness and ability to perform Activities of daily living and transfers safely with modified independence. Pt family available to assist pt if needed while pt heals from vaginal delivery. No further skilled OT services recommended at this time. please reconsult if any changes present. good, to achieve stated therapy goals/Pt family available to assist pt if needed while pt heals from vaginal delivery. OT to follow up with education on self care while handling  and 2 y/o child at home.

## 2019-01-10 NOTE — PROGRESS NOTE ADULT - NSHPATTENDINGPLANDISCUSS_GEN_ALL_CORE
CTU team
icu team
NP and medical team
icu TEAM
ICU TEAM
MICU and Neurosurgery teams.
Neurology PA and MICU Fellow
Neurology NP, MICU Fellow, and Neurosurgery
MICU TEAM
Neurology NP and MICU Fellow
Stroke Team
patient, her , medical and stroke team.

## 2019-01-10 NOTE — DISCHARGE NOTE ADULT - CARE PLAN
Principal Discharge DX:	Subarachnoid hemorrhage  Goal:	Medical management and outpatient follow up  Assessment and plan of treatment:	Please continue medications as directed above. Follow with strict BP control, try to follow a low salt diet, and take nimodipine for a total 21 day course (last day 1/20/19). Follow up with Dr. Rider within one week for further management and to discuss results of EEG and possibly discontinue Keppra as you have taken 11 days while admitted to the hospital. You may take Tylenol as needed for pain control. As discussed, if headache suddenly worsens, you have associated nausea/vomiting, or blood pressure remains extremely uncontrolled, return to the ED immediately.  Secondary Diagnosis:	Hypertension  Goal:	Medical management  Assessment and plan of treatment:	Please taken medications as above every 6 hours and measure your blood pressure to keep  or below. Follow up with your primary doctor Dr. Myles within one week to adjust medications as needed or sooner if your BP is not under control. Also follow up with Dr. Burnett (cardiology) in 1-2 weeks.  Secondary Diagnosis:	Lower back pain  Goal:	Pain control  Assessment and plan of treatment:	Please continue to take the steroids for 5 more days following discharge to reduce inflammation and help with pain control (1/11 - 1/15) as well as pantoprazole at the same time for any GI upset that may be caused by the steroids. Increase activity slowly as tolerated.  Secondary Diagnosis:	Delivery normal  Goal:	Post-partum follow up  Assessment and plan of treatment:	Please follow up with your OBGYN doctor as scheduled. Avoid breastfeeding while on medications above.

## 2019-01-10 NOTE — PROGRESS NOTE ADULT - REASON FOR ADMISSION
SAH , transfer from [facility]
SAH , transfer from [facility]
SAH , transfer from [facility]
SAH , transfer from [facility]
SAH , transfer from [facility]
SAH , transfer from [facility]
SAH , transfer from [facility]
SAH , transfer from [facility]
SAH , transfer from [facility]
SAH , transfer from [facility]
SAH , transfer from [facility]
SAH , transfer from [facility]
SAH , transfer from [facility]
SAH , transfer from [facility]
SAH , transfer from [facility]
SAH , transfer from [facility]
SAH , [email or web address]
SAH , transfer from [facility]
SAH , transfer from [facility]
SAH , transfer from [facility]
SAH , transfer from [facility]
SAH , transfer from [facility]
SAH , transfer from [facility]
SAH , transfer from [facility]
SAH , transfer from [facility]
SAH , transfer from [facility]
SAH , transfer from [facility]
SAH , transfer from [facility]
SAH , transfer from [facility]
SAH , transfer from [facility]
SAH , transfer from [facility]
SAH , transfer from [facility]
SAH , transfer from [facility]
SAH , transfer from [facility]
SAH , transfer from [facility]
SAH , transfer from [facility]
SAH , transfer from [facility]
SAH , transfer from [facility]
SAH , transfer from [facility]
SAH , transfer from [facility]
SAH , transfer from [facility]
SAH , transfer from [facility]
SAH , transfer from [facility]
SAH , transfer from [facility]

## 2019-01-10 NOTE — DISCHARGE NOTE ADULT - CARE PROVIDERS DIRECT ADDRESSES
,DirectAddress_Unknown,[email or web address]

## 2019-01-10 NOTE — PROGRESS NOTE ADULT - ASSESSMENT
Impression:  32 yo f  presented 2 days post partum with a history of gestational HTN, PCOS and asthma transferred from [facility] for management of SAH.  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 Today her exam remains non focal, she reports significant improvement in headaches. She continues to complain of low back and leg pain for which an MRI L spine was completed. Patient was uneventful overnight. Patient needs to see Dr Rider in 1 week at the outpatient neuro intervention clinic at [address] [place] [address]. Patient was given an appointment for  at 1pm. Patient to obtain a repeat CTH N/C prior to clinic Appointment.    Suggestions:  Follow up pending official read of MR  lumbar spine   continue  Labetalol to 300 mg PO q6 hours.  continue  Nimodipine 30 mg q2h.  Keep -140.  continue Tylenol 650mg q6h PRN for headache.  may continue  Motrin prn.   Continue Magnesium oxide 400 mg q8 hours.   Keep Magnesium level >2.   Continue Keppra 750mg bid  Seizure precautions.   Ambulate 3 times a day with supervision.  Please Call Code Stroke if worsening of neurological symptoms.  Neurological management per Stroke Team.  Medical Management per Medical team. Impression:  32 yo f  presented 2 days post partum with a history of gestational HTN, PCOS and asthma transferred from [facility] for management of SAH.  We performed a diagnostic cerebral angiogram which failed to reveal significant vascular abnormality .MRI brain revealed a 1 cm left temporal superficial cavernoma which may have bled into the sulcus causing SAH. Today her exam remains non focal, she reports significant improvement in headaches. She continues to complain of low back and leg pain for which an MRI L spine completed which is unremarkable except for mild degenerative changes. Patient was uneventful overnight. Patient needs to see Dr Rider in 1 week at the outpatient neuro intervention clinic at [address] [place] [address]. Patient was given an appointment for  at 1pm. Patient to obtain a repeat CTH N/C prior to clinic Appointment.    Suggestions:  Obtain REEG  continue  Labetalol to 300 mg PO q6 hours.  continue  Nimodipine 30 mg q2h.  Keep -140.  continue Tylenol 650mg q6h PRN for headache.  may continue  Motrin prn.   Continue Magnesium oxide 400 mg q8 hours.   Keep Magnesium level >2.   Continue Keppra 750mg bid  Seizure precautions.   Ambulate 3 times a day with supervision.  Neurological management per Stroke Team.  Medical Management per Medical team. Impression:  32 yo f  presented 2 days post partum with a history of gestational HTN, PCOS and asthma transferred from [facility] for management of SAH.  We performed a diagnostic cerebral angiogram which failed to reveal significant vascular abnormality .MRI brain revealed a 1 cm left temporal superficial cavernoma which may have bled into the sulcus causing SAH. Today her exam remains non focal, she reports significant improvement in headaches. She continues to complain of low back and leg pain for which an MRI L spine completed which is unremarkable except for mild degenerative changes. Patient was uneventful overnight. Patient needs to see Dr Rider in 1 week at the outpatient neuro intervention clinic at [address] [place] [address]. Patient given an appointment for  at 1pm. Patient to obtain a repeat CTH N/C prior to clinic Appointment.    Suggestions:  Obtain REEG  continue  Labetalol to 300 mg PO q6 hours.  continue  Nimodipine 30 mg q2h.  Keep -140.  continue Tylenol 650mg q6h PRN for headache.  may continue  Motrin prn.   Continue Magnesium oxide 400 mg q8 hours.   Keep Magnesium level >2.   Continue Keppra 750mg bid  Seizure precautions.   Ambulate 3 times a day with supervision.  Neurological management per Stroke Team.  Medical Management per Medical team. Impression:  34 yo f  presented 2 days post partum with a history of gestational HTN, PCOS and asthma transferred from [facility] for management of SAH.  We performed a diagnostic cerebral angiogram which failed to reveal significant vascular abnormality .MRI brain revealed a 1 cm left temporal superficial cavernoma which may have bled into the sulcus causing SAH. Today her exam remains non focal, she reports significant improvement in headaches. She continues to complain of low back and leg pain for which an MRI L spine completed which is unremarkable except for mild degenerative changes. Patient needs to see Dr Rider in 1 week at the outpatient neuro intervention clinic at [address] [place] [address]. Patient given an appointment for  at 1pm. Patient to obtain a repeat CTH N/C prior to clinic Appointment.    Suggestions:  Obtain REEG  continue  Labetalol to 300 mg PO q6 hours.  continue  Nimodipine 30 mg q2h.  Keep -140.  continue Tylenol 650mg q6h PRN for headache.  may continue  Motrin prn.   Continue Magnesium oxide 400 mg q8 hours.   Keep Magnesium level >2.   Continue Keppra 750mg bid  Seizure precautions.   Ambulate 3 times a day with supervision.  Neurological management per Stroke Team.  Medical Management per Medical team. Impression:  32 yo f  presented 2 days post partum with a history of gestational HTN, PCOS and asthma transferred from [facility] for management of SAH.  We performed a diagnostic cerebral angiogram which failed to reveal significant vascular abnormality .MRI brain revealed a 1 cm left temporal superficial cavernoma which may have bled into the sulcus causing SAH. Today her exam remains non focal, she reports significant improvement in headaches. She continues to complain of low back and leg pain for which an MRI L spine completed which is unremarkable except for mild degenerative changes. Patient needs to be seen in [facility] in 1 week. Patient was given an appointment for  at 1pm. Patient to obtain a repeat CTH N/C prior to clinic Appointment.    Suggestions:  Obtain REEG  continue  Labetalol to 300 mg PO q6 hours.  change  Nimodipine to 60 mg q4h.  Keep -140.  May continue  Motrin prn.   Continue Magnesium oxide 400 mg q8 hours.   Continue Keppra 750mg bid for now  Neurological management per Stroke Team.  Medical Management per Medical team.

## 2019-01-10 NOTE — PROGRESS NOTE ADULT - ASSESSMENT
ADMISSION SUMMARY  34 y/o female  w/ PMH of Gestational HTN, PCOS and asthma transferred from [facility] for management of SAH with CTH showing small SAH in left frontotemporal region.    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  - CT perfusion (): Areas of relative ischemia, unknown clinical significance   - CT head (): Subarachnoid hemorrhage nearly resolved  - As per neuro, target -140  - Continue with labetolol 300 mg PO q6h and hydralazine PRN if SBP>140  - Continue nimodipine 60 mg PO q4h for now (will need nimodipine for 3 weeks as per neuro)  - Continue Keppra 750 mg q12h for seizure ppx for now but if EEG is negative, will d/c as per neuro  -    # Hypertension - improved control (maintain -140)  - pre-eclampsia as per pt  - Continue with labetolol 300 mg PO q6h and hydralazine     # LBP and b/l LE pain likely 2/2 reabsorption of blood products post prolonged immobilization/post-partum  - XR L-spine negative, LE duplex negative  - Steroids taper ADMISSION SUMMARY  32 y/o female  w/ PMH of Gestational HTN, PCOS and asthma transferred from [facility] for management of SAH with CTH showing small SAH in left frontotemporal region.    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  - CT perfusion (): Areas of relative ischemia, unknown clinical significance   - CT head (): Subarachnoid hemorrhage nearly resolved  - As per neuro, target -140  - Continue with labetolol 300 mg PO q6h and hydralazine PRN if SBP>140  - Continue nimodipine 60 mg PO q4h for now (will need nimodipine for 3 weeks as per neuro)  - Continue Keppra 750 mg q12h for seizure ppx for now but if EEG is negative, will d/c as per neuro  -d/w Malia Rider and Hiram alas who both cleared pt for outpt f/u    # Hypertension - improved control (maintain -140)  - pre-eclampsia as per pt  - Continue with labetolol 300 mg PO q6h and hydralazine     # LBP and b/l LE pain likely 2/2 reabsorption of blood products post prolonged immobilization/post-partum  - XR L-spine negative, LE duplex negative  - Steroids taper      Discharge instructions discussed and patient/ know when to seek immediate medical attention.  Patient has proper follow up.  All results discussed and patient aware they may require further work up.  Stressed importance of proper follow up.  Medications prescribed and changes discussed.  All questions and concerns from patient and family addressed. Understanding of instructions verbalized.    Time spent in completing discharge process and coordinating care 45 minutes.

## 2019-01-10 NOTE — CHART NOTE - NSCHARTNOTEFT_GEN_A_CORE
<<<RESIDENT DISCHARGE NOTE>>>     BALDEV KEE  MRN-[id number]    VITAL SIGNS:  T(F): 95.5 (01-10-19 @ 14:14), Max: 99.2 (01-09-19 @ 20:21)  HR: 86 (01-10-19 @ 14:14)  BP: 131/80 (01-10-19 @ 14:14)  SpO2: 95% (01-10-19 @ 11:15)      PHYSICAL EXAMINATION:  General: NAD, well-developed, AAOx3  Head & Neck: Atraumatic, Normocephalic. EOMI  Pulmonary: Clear to auscultation bilaterally; No wheezing or crackles  Cardiovascular: Regular rate and rhythm; No murmurs appreciated  Gastrointestinal/Abdomen & Pelvis: Soft, Nondistended, mild soreness lower abdomen; Bowel sounds present  Neurologic/Motor: Strength 5/5 all four extremities. Sensation symmetric and intact    TEST RESULTS:                        13.4   7.64  )-----------( 319      ( 10 Bruce 2019 06:20 )             40.9       01-10    139  |  99  |  24<H>  ----------------------------<  92  4.4   |  21  |  0.6<L>    Ca    9.6      10 Bruce 2019 06:20  Mg     2.1     01-10        FINAL DISCHARGE INTERVIEW:  Resident(s) Present: (Name: Kate Munson, RN Present: (Name: Bakari)    DISCHARGE MEDICATION RECONCILIATION  reviewed with Attending (Name: Dr. Zhao)    DISPOSITION:   [ X ] Home,    [  ] Home with Visiting Nursing Services,   [    ]  SNF/ NH,    [   ] Acute Rehab (4A),   [   ] Other (Specify:_________)

## 2019-01-10 NOTE — DISCHARGE NOTE ADULT - ADDITIONAL INSTRUCTIONS
Please follow up with:  -Primary Dr. Myles within 1 week (address BP control)  -Neuroendovascular Dr. Rider within 1 week (to follow up subarachnoid hemorrhage and seizure prophylaxis)  -OBGYN doctor as scheduled

## 2019-01-10 NOTE — DISCHARGE NOTE ADULT - PATIENT PORTAL LINK FT
You can access the [facility] Patient Portal, offered by [facility], by registering with the following website: [email or web address]

## 2019-01-15 ENCOUNTER — OUTPATIENT (OUTPATIENT)
Dept: OUTPATIENT SERVICES | Facility: HOSPITAL | Age: 34
LOS: 1 days | Discharge: HOME | End: 2019-01-15

## 2019-01-15 DIAGNOSIS — H53.149 VISUAL DISCOMFORT, UNSPECIFIED: ICD-10-CM

## 2019-01-15 DIAGNOSIS — E28.2 POLYCYSTIC OVARIAN SYNDROME: ICD-10-CM

## 2019-01-15 DIAGNOSIS — R42 DIZZINESS AND GIDDINESS: ICD-10-CM

## 2019-01-15 DIAGNOSIS — I60.9 NONTRAUMATIC SUBARACHNOID HEMORRHAGE, UNSPECIFIED: ICD-10-CM

## 2019-01-15 DIAGNOSIS — J45.909 UNSPECIFIED ASTHMA, UNCOMPLICATED: ICD-10-CM

## 2019-01-15 DIAGNOSIS — R51 HEADACHE: ICD-10-CM

## 2019-01-15 DIAGNOSIS — M51.26 OTHER INTERVERTEBRAL DISC DISPLACEMENT, LUMBAR REGION: ICD-10-CM

## 2019-01-15 DIAGNOSIS — I10 ESSENTIAL (PRIMARY) HYPERTENSION: ICD-10-CM

## 2019-01-15 DIAGNOSIS — E83.41 HYPERMAGNESEMIA: ICD-10-CM

## 2019-01-15 DIAGNOSIS — M54.9 DORSALGIA, UNSPECIFIED: ICD-10-CM

## 2019-01-15 DIAGNOSIS — O99.89 OTHER SPECIFIED DISEASES AND CONDITIONS COMPLICATING PREGNANCY, CHILDBIRTH AND THE PUERPERIUM: ICD-10-CM

## 2019-01-17 ENCOUNTER — APPOINTMENT (OUTPATIENT)
Dept: CARDIOLOGY | Facility: CLINIC | Age: 34
End: 2019-01-17

## 2019-01-17 VITALS — HEART RATE: 64 BPM | SYSTOLIC BLOOD PRESSURE: 120 MMHG | RESPIRATION RATE: 18 BRPM | DIASTOLIC BLOOD PRESSURE: 80 MMHG

## 2019-01-17 VITALS — BODY MASS INDEX: 29.44 KG/M2 | WEIGHT: 160 LBS | HEIGHT: 62 IN

## 2019-01-17 DIAGNOSIS — Z87.42 PERSONAL HISTORY OF OTHER DISEASES OF THE FEMALE GENITAL TRACT: ICD-10-CM

## 2019-01-17 DIAGNOSIS — Z82.3 FAMILY HISTORY OF STROKE: ICD-10-CM

## 2019-01-17 DIAGNOSIS — Z82.49 FAMILY HISTORY OF ISCHEMIC HEART DISEASE AND OTHER DISEASES OF THE CIRCULATORY SYSTEM: ICD-10-CM

## 2019-01-17 DIAGNOSIS — Z87.09 PERSONAL HISTORY OF OTHER DISEASES OF THE RESPIRATORY SYSTEM: ICD-10-CM

## 2019-01-17 NOTE — PHYSICAL EXAM
[General Appearance - Well Developed] : well developed [Normal Appearance] : normal appearance [Well Groomed] : well groomed [General Appearance - Well Nourished] : well nourished [No Deformities] : no deformities [General Appearance - In No Acute Distress] : no acute distress [Normal Conjunctiva] : the conjunctiva exhibited no abnormalities [Eyelids - No Xanthelasma] : the eyelids demonstrated no xanthelasmas [Normal Oral Mucosa] : normal oral mucosa [No Oral Pallor] : no oral pallor [No Oral Cyanosis] : no oral cyanosis [Normal Jugular Venous A Waves Present] : normal jugular venous A waves present [Normal Jugular Venous V Waves Present] : normal jugular venous V waves present [No Jugular Venous Vera A Waves] : no jugular venous vear A waves [Respiration, Rhythm And Depth] : normal respiratory rhythm and effort [Exaggerated Use Of Accessory Muscles For Inspiration] : no accessory muscle use [Auscultation Breath Sounds / Voice Sounds] : lungs were clear to auscultation bilaterally [Heart Rate And Rhythm] : heart rate and rhythm were normal [Heart Sounds] : normal S1 and S2 [Murmurs] : no murmurs present [Bowel Sounds] : normal bowel sounds [Abdomen Soft] : soft [Abdomen Tenderness] : non-tender [Abdomen Mass (___ Cm)] : no abdominal mass palpated [Abnormal Walk] : normal gait [Gait - Sufficient For Exercise Testing] : the gait was sufficient for exercise testing [Nail Clubbing] : no clubbing of the fingernails [Cyanosis, Localized] : no localized cyanosis [Petechial Hemorrhages (___cm)] : no petechial hemorrhages [Skin Color & Pigmentation] : normal skin color and pigmentation [] : no rash [No Venous Stasis] : no venous stasis [Skin Lesions] : no skin lesions [No Skin Ulcers] : no skin ulcer [No Xanthoma] : no  xanthoma was observed [Oriented To Time, Place, And Person] : oriented to person, place, and time

## 2019-01-29 ENCOUNTER — TRANSCRIPTION ENCOUNTER (OUTPATIENT)
Age: 34
End: 2019-01-29

## 2019-02-15 ENCOUNTER — APPOINTMENT (OUTPATIENT)
Dept: CARDIOLOGY | Facility: CLINIC | Age: 34
End: 2019-02-15

## 2019-02-15 VITALS — DIASTOLIC BLOOD PRESSURE: 70 MMHG | SYSTOLIC BLOOD PRESSURE: 110 MMHG | HEART RATE: 64 BPM | RESPIRATION RATE: 18 BRPM

## 2019-02-15 VITALS — WEIGHT: 156 LBS | BODY MASS INDEX: 28.71 KG/M2 | HEIGHT: 62 IN

## 2019-02-15 RX ORDER — NIMODIPINE 30 MG/1
30 CAPSULE, LIQUID FILLED ORAL
Refills: 0 | Status: DISCONTINUED | COMMUNITY
End: 2019-02-15

## 2019-02-15 RX ORDER — VITAMIN E (DL,TOCOPHERYL ACET) 180 MG
500 CAPSULE ORAL
Refills: 0 | Status: DISCONTINUED | COMMUNITY
End: 2019-02-15

## 2019-02-15 RX ORDER — LEVETIRACETAM 750 MG/1
750 TABLET, FILM COATED ORAL TWICE DAILY
Refills: 0 | Status: DISCONTINUED | COMMUNITY
End: 2019-02-15

## 2019-02-15 NOTE — PHYSICAL EXAM
[General Appearance - Well Developed] : well developed [Normal Appearance] : normal appearance [Well Groomed] : well groomed [General Appearance - Well Nourished] : well nourished [No Deformities] : no deformities [General Appearance - In No Acute Distress] : no acute distress [Normal Conjunctiva] : the conjunctiva exhibited no abnormalities [Eyelids - No Xanthelasma] : the eyelids demonstrated no xanthelasmas [Normal Oral Mucosa] : normal oral mucosa [No Oral Pallor] : no oral pallor [No Oral Cyanosis] : no oral cyanosis [Normal Jugular Venous A Waves Present] : normal jugular venous A waves present [Normal Jugular Venous V Waves Present] : normal jugular venous V waves present [No Jugular Venous Vera A Waves] : no jugular venous vera A waves [Respiration, Rhythm And Depth] : normal respiratory rhythm and effort [Exaggerated Use Of Accessory Muscles For Inspiration] : no accessory muscle use [Auscultation Breath Sounds / Voice Sounds] : lungs were clear to auscultation bilaterally [Heart Rate And Rhythm] : heart rate and rhythm were normal [Heart Sounds] : normal S1 and S2 [Murmurs] : no murmurs present [Bowel Sounds] : normal bowel sounds [Abdomen Soft] : soft [Abdomen Tenderness] : non-tender [Abdomen Mass (___ Cm)] : no abdominal mass palpated [Abnormal Walk] : normal gait [Gait - Sufficient For Exercise Testing] : the gait was sufficient for exercise testing [Nail Clubbing] : no clubbing of the fingernails [Cyanosis, Localized] : no localized cyanosis [Petechial Hemorrhages (___cm)] : no petechial hemorrhages [Skin Color & Pigmentation] : normal skin color and pigmentation [] : no rash [No Venous Stasis] : no venous stasis [Skin Lesions] : no skin lesions [No Skin Ulcers] : no skin ulcer [No Xanthoma] : no  xanthoma was observed [Oriented To Time, Place, And Person] : oriented to person, place, and time

## 2019-03-04 ENCOUNTER — TRANSCRIPTION ENCOUNTER (OUTPATIENT)
Age: 34
End: 2019-03-04

## 2019-04-26 ENCOUNTER — APPOINTMENT (OUTPATIENT)
Dept: CARDIOLOGY | Facility: CLINIC | Age: 34
End: 2019-04-26
Payer: COMMERCIAL

## 2019-04-26 VITALS — SYSTOLIC BLOOD PRESSURE: 118 MMHG | HEART RATE: 68 BPM | RESPIRATION RATE: 18 BRPM | DIASTOLIC BLOOD PRESSURE: 80 MMHG

## 2019-04-26 VITALS — HEIGHT: 62 IN | BODY MASS INDEX: 29.44 KG/M2 | WEIGHT: 160 LBS

## 2019-04-26 PROCEDURE — 99213 OFFICE O/P EST LOW 20 MIN: CPT

## 2019-04-26 PROCEDURE — 93000 ELECTROCARDIOGRAM COMPLETE: CPT

## 2019-04-26 NOTE — PHYSICAL EXAM
[Normal Appearance] : normal appearance [Well Groomed] : well groomed [General Appearance - Well Developed] : well developed [General Appearance - In No Acute Distress] : no acute distress [No Deformities] : no deformities [General Appearance - Well Nourished] : well nourished [Eyelids - No Xanthelasma] : the eyelids demonstrated no xanthelasmas [Normal Conjunctiva] : the conjunctiva exhibited no abnormalities [No Oral Pallor] : no oral pallor [Normal Oral Mucosa] : normal oral mucosa [Normal Jugular Venous A Waves Present] : normal jugular venous A waves present [No Oral Cyanosis] : no oral cyanosis [Normal Jugular Venous V Waves Present] : normal jugular venous V waves present [No Jugular Venous Vera A Waves] : no jugular venous vera A waves [Respiration, Rhythm And Depth] : normal respiratory rhythm and effort [Exaggerated Use Of Accessory Muscles For Inspiration] : no accessory muscle use [Auscultation Breath Sounds / Voice Sounds] : lungs were clear to auscultation bilaterally [Heart Rate And Rhythm] : heart rate and rhythm were normal [Heart Sounds] : normal S1 and S2 [Murmurs] : no murmurs present [Abdomen Soft] : soft [Bowel Sounds] : normal bowel sounds [Abdomen Tenderness] : non-tender [Abdomen Mass (___ Cm)] : no abdominal mass palpated [Abnormal Walk] : normal gait [Gait - Sufficient For Exercise Testing] : the gait was sufficient for exercise testing [Petechial Hemorrhages (___cm)] : no petechial hemorrhages [Nail Clubbing] : no clubbing of the fingernails [Cyanosis, Localized] : no localized cyanosis [Skin Color & Pigmentation] : normal skin color and pigmentation [No Venous Stasis] : no venous stasis [] : no rash [Skin Lesions] : no skin lesions [No Skin Ulcers] : no skin ulcer [No Xanthoma] : no  xanthoma was observed [Oriented To Time, Place, And Person] : oriented to person, place, and time

## 2019-05-06 ENCOUNTER — RECORD ABSTRACTING (OUTPATIENT)
Age: 34
End: 2019-05-06

## 2019-05-06 DIAGNOSIS — R51 HEADACHE: ICD-10-CM

## 2019-05-06 DIAGNOSIS — O13.9 GESTATIONAL [PREGNANCY-INDUCED] HYPERTENSION W/OUT SIGNIFICANT PROTEINURIA, UNSPECIFIED TRIMESTER: ICD-10-CM

## 2019-05-10 ENCOUNTER — OUTPATIENT (OUTPATIENT)
Dept: OUTPATIENT SERVICES | Facility: HOSPITAL | Age: 34
LOS: 1 days | Discharge: HOME | End: 2019-05-10
Payer: COMMERCIAL

## 2019-05-10 VITALS
WEIGHT: 162.04 LBS | HEART RATE: 72 BPM | HEIGHT: 62 IN | RESPIRATION RATE: 18 BRPM | TEMPERATURE: 98 F | DIASTOLIC BLOOD PRESSURE: 51 MMHG | OXYGEN SATURATION: 96 % | SYSTOLIC BLOOD PRESSURE: 103 MMHG

## 2019-05-10 DIAGNOSIS — I67.9 CEREBROVASCULAR DISEASE, UNSPECIFIED: ICD-10-CM

## 2019-05-10 DIAGNOSIS — Z01.818 ENCOUNTER FOR OTHER PREPROCEDURAL EXAMINATION: ICD-10-CM

## 2019-05-10 LAB
ALBUMIN SERPL ELPH-MCNC: 4.6 G/DL — SIGNIFICANT CHANGE UP (ref 3.5–5.2)
ALP SERPL-CCNC: 77 U/L — SIGNIFICANT CHANGE UP (ref 30–115)
ALT FLD-CCNC: 20 U/L — SIGNIFICANT CHANGE UP (ref 0–41)
ANION GAP SERPL CALC-SCNC: 12 MMOL/L — SIGNIFICANT CHANGE UP (ref 7–14)
APTT BLD: 42.7 SEC — HIGH (ref 27–39.2)
AST SERPL-CCNC: 17 U/L — SIGNIFICANT CHANGE UP (ref 0–41)
BASOPHILS # BLD AUTO: 0.06 K/UL — SIGNIFICANT CHANGE UP (ref 0–0.2)
BASOPHILS NFR BLD AUTO: 1.1 % — HIGH (ref 0–1)
BILIRUB SERPL-MCNC: 0.4 MG/DL — SIGNIFICANT CHANGE UP (ref 0.2–1.2)
BUN SERPL-MCNC: 19 MG/DL — SIGNIFICANT CHANGE UP (ref 10–20)
CALCIUM SERPL-MCNC: 9.2 MG/DL — SIGNIFICANT CHANGE UP (ref 8.5–10.1)
CHLORIDE SERPL-SCNC: 105 MMOL/L — SIGNIFICANT CHANGE UP (ref 98–110)
CO2 SERPL-SCNC: 25 MMOL/L — SIGNIFICANT CHANGE UP (ref 17–32)
CREAT SERPL-MCNC: 0.7 MG/DL — SIGNIFICANT CHANGE UP (ref 0.7–1.5)
EOSINOPHIL # BLD AUTO: 0.44 K/UL — SIGNIFICANT CHANGE UP (ref 0–0.7)
EOSINOPHIL NFR BLD AUTO: 8.2 % — HIGH (ref 0–8)
GLUCOSE SERPL-MCNC: 98 MG/DL — SIGNIFICANT CHANGE UP (ref 70–99)
HCT VFR BLD CALC: 39.1 % — SIGNIFICANT CHANGE UP (ref 37–47)
HGB BLD-MCNC: 12.6 G/DL — SIGNIFICANT CHANGE UP (ref 12–16)
IMM GRANULOCYTES NFR BLD AUTO: 0.2 % — SIGNIFICANT CHANGE UP (ref 0.1–0.3)
INR BLD: 1 RATIO — SIGNIFICANT CHANGE UP (ref 0.65–1.3)
LYMPHOCYTES # BLD AUTO: 1.33 K/UL — SIGNIFICANT CHANGE UP (ref 1.2–3.4)
LYMPHOCYTES # BLD AUTO: 24.9 % — SIGNIFICANT CHANGE UP (ref 20.5–51.1)
MCHC RBC-ENTMCNC: 28.5 PG — SIGNIFICANT CHANGE UP (ref 27–31)
MCHC RBC-ENTMCNC: 32.2 G/DL — SIGNIFICANT CHANGE UP (ref 32–37)
MCV RBC AUTO: 88.5 FL — SIGNIFICANT CHANGE UP (ref 81–99)
MONOCYTES # BLD AUTO: 0.38 K/UL — SIGNIFICANT CHANGE UP (ref 0.1–0.6)
MONOCYTES NFR BLD AUTO: 7.1 % — SIGNIFICANT CHANGE UP (ref 1.7–9.3)
NEUTROPHILS # BLD AUTO: 3.13 K/UL — SIGNIFICANT CHANGE UP (ref 1.4–6.5)
NEUTROPHILS NFR BLD AUTO: 58.5 % — SIGNIFICANT CHANGE UP (ref 42.2–75.2)
NRBC # BLD: 0 /100 WBCS — SIGNIFICANT CHANGE UP (ref 0–0)
PLATELET # BLD AUTO: 224 K/UL — SIGNIFICANT CHANGE UP (ref 130–400)
POTASSIUM SERPL-MCNC: 4.4 MMOL/L — SIGNIFICANT CHANGE UP (ref 3.5–5)
POTASSIUM SERPL-SCNC: 4.4 MMOL/L — SIGNIFICANT CHANGE UP (ref 3.5–5)
PROT SERPL-MCNC: 6.8 G/DL — SIGNIFICANT CHANGE UP (ref 6–8)
PROTHROM AB SERPL-ACNC: 11.5 SEC — SIGNIFICANT CHANGE UP (ref 9.95–12.87)
RBC # BLD: 4.42 M/UL — SIGNIFICANT CHANGE UP (ref 4.2–5.4)
RBC # FLD: 11.5 % — SIGNIFICANT CHANGE UP (ref 11.5–14.5)
SODIUM SERPL-SCNC: 142 MMOL/L — SIGNIFICANT CHANGE UP (ref 135–146)
WBC # BLD: 5.35 K/UL — SIGNIFICANT CHANGE UP (ref 4.8–10.8)
WBC # FLD AUTO: 5.35 K/UL — SIGNIFICANT CHANGE UP (ref 4.8–10.8)

## 2019-05-10 PROCEDURE — 99222 1ST HOSP IP/OBS MODERATE 55: CPT

## 2019-05-10 PROCEDURE — 93010 ELECTROCARDIOGRAM REPORT: CPT

## 2019-05-10 RX ORDER — AMLODIPINE BESYLATE 2.5 MG/1
1 TABLET ORAL
Qty: 0 | Refills: 0 | DISCHARGE

## 2019-05-10 NOTE — H&P PST ADULT - NSICDXPASTMEDICALHX_GEN_ALL_CORE_FT
PAST MEDICAL HISTORY:  Asthma     Gestational hypertension     H/O subarachnoid hemorrhage     PCOS (polycystic ovarian syndrome)

## 2019-05-10 NOTE — H&P PST ADULT - NSICDXFAMILYHX_GEN_ALL_CORE_FT
FAMILY HISTORY:  Father  Still living? Yes, Estimated age: Age Unknown  Family history of high cholesterol, Age at diagnosis: Age Unknown    Mother  Still living? Yes, Estimated age: Age Unknown  Family history of hypertension, Age at diagnosis: Age Unknown

## 2019-05-16 ENCOUNTER — OUTPATIENT (OUTPATIENT)
Dept: OUTPATIENT SERVICES | Facility: HOSPITAL | Age: 34
LOS: 1 days | Discharge: HOME | End: 2019-05-16
Payer: COMMERCIAL

## 2019-05-16 DIAGNOSIS — Z88.8 ALLERGY STATUS TO OTHER DRUGS, MEDICAMENTS AND BIOLOGICAL SUBSTANCES: ICD-10-CM

## 2019-05-16 DIAGNOSIS — J45.909 UNSPECIFIED ASTHMA, UNCOMPLICATED: ICD-10-CM

## 2019-05-16 DIAGNOSIS — Z91.09 OTHER ALLERGY STATUS, OTHER THAN TO DRUGS AND BIOLOGICAL SUBSTANCES: ICD-10-CM

## 2019-05-16 DIAGNOSIS — I60.8 OTHER NONTRAUMATIC SUBARACHNOID HEMORRHAGE: ICD-10-CM

## 2019-05-16 PROCEDURE — 36224 PLACE CATH CAROTD ART: CPT

## 2019-05-16 PROCEDURE — 76937 US GUIDE VASCULAR ACCESS: CPT | Mod: 26

## 2019-05-16 PROCEDURE — 36226 PLACE CATH VERTEBRAL ART: CPT | Mod: LT

## 2019-05-16 RX ORDER — LABETALOL HCL 100 MG
300 TABLET ORAL ONCE
Refills: 0 | Status: DISCONTINUED | OUTPATIENT
Start: 2019-05-16 | End: 2019-05-31

## 2019-05-22 ENCOUNTER — APPOINTMENT (OUTPATIENT)
Dept: NEUROLOGY | Facility: CLINIC | Age: 34
End: 2019-05-22
Payer: COMMERCIAL

## 2019-05-22 ENCOUNTER — OTHER (OUTPATIENT)
Age: 34
End: 2019-05-22

## 2019-05-22 VITALS
WEIGHT: 165 LBS | BODY MASS INDEX: 30.36 KG/M2 | DIASTOLIC BLOOD PRESSURE: 75 MMHG | OXYGEN SATURATION: 98 % | HEIGHT: 62 IN | SYSTOLIC BLOOD PRESSURE: 115 MMHG | TEMPERATURE: 97.7 F | HEART RATE: 69 BPM

## 2019-05-22 DIAGNOSIS — I77.9 DISORDER OF ARTERIES AND ARTERIOLES, UNSPECIFIED: ICD-10-CM

## 2019-05-22 DIAGNOSIS — Q27.9 CONGENITAL MALFORMATION OF PERIPHERAL VASCULAR SYSTEM, UNSPECIFIED: ICD-10-CM

## 2019-05-22 PROCEDURE — 99215 OFFICE O/P EST HI 40 MIN: CPT

## 2019-05-22 NOTE — PHYSICAL EXAM
[General Appearance - Alert] : alert [General Appearance - In No Acute Distress] : in no acute distress [Oriented To Time, Place, And Person] : oriented to person, place, and time [Impaired Insight] : insight and judgment were intact [Affect] : the affect was normal [Mood] : the mood was normal [Memory Recent] : recent memory was not impaired [Memory Remote] : remote memory was not impaired [Person] : oriented to person [Place] : oriented to place [Time] : oriented to time [Short Term Intact] : short term memory intact [Remote Intact] : remote memory intact [Registration Intact] : recent registration memory intact [Span Intact] : the attention span was normal [Concentration Intact] : normal concentrating ability [Visual Intact] : visual attention was ~T not ~L decreased [Naming Objects] : no difficulty naming common objects [Repeating Phrases] : no difficulty repeating a phrase [Writing A Sentence] : no difficulty writing a sentence [Fluency] : fluency intact [Comprehension] : comprehension intact [Reading] : reading intact [Current Events] : adequate knowledge of current events [Past History] : adequate knowledge of personal past history [Vocabulary] : adequate range of vocabulary [I: Normal Smell] : smell intact bilaterally [Cranial Nerves Optic (II)] : visual acuity intact bilaterally,  visual fields full to confrontation, pupils equal round and reactive to light [Cranial Nerves Oculomotor (III)] : extraocular motion intact [Cranial Nerves Trigeminal (V)] : facial sensation intact symmetrically [Cranial Nerves Facial (VII)] : face symmetrical [Cranial Nerves Vestibulocochlear (VIII)] : hearing was intact bilaterally [Cranial Nerves Glossopharyngeal (IX)] : tongue and palate midline [Cranial Nerves Accessory (XI - Cranial And Spinal)] : head turning and shoulder shrug symmetric [Cranial Nerves Hypoglossal (XII)] : there was no tongue deviation with protrusion [Motor Tone] : muscle tone was normal in all four extremities [Motor Strength] : muscle strength was normal in all four extremities [Motor Handedness Right-Handed] : the patient is right hand dominant [Balance] : balance was intact [Non-ambulatory] : Non-ambulatory [Sclera] : the sclera and conjunctiva were normal [PERRL With Normal Accommodation] : pupils were equal in size, round, reactive to light, with normal accommodation [Extraocular Movements] : extraocular movements were intact [Full Visual Field] : full visual field [Outer Ear] : the ears and nose were normal in appearance [Neck Appearance] : the appearance of the neck was normal [] : no respiratory distress [Arterial Pulses Carotid] : carotid pulses were normal with no bruits [Abnormal Walk] : normal gait [Skin Color & Pigmentation] : normal skin color and pigmentation [Skin Turgor] : normal skin turgor [FreeTextEntry1] : She is AAO X3, following complex commands, speaks, names, and repeats with no difficulty. Visual fields are full and extraocular movements are intact. Pupils are round and reactive to light. No facial asymmetry. Tongue, uvula, and palate are midline. On motor examination no drift is detected and her strength is 5/5 in all 4 extremities. Her DTR are +2 bilaterally and symmetrically in all extremities. She has no numbness to pinprick to lateral aspect of her right foot as well as his right forearm. Fine finger movements are normal. There is no dysmetria on finger-to-nose and heel-knee-shin. There are no abnormal or extraneous movements. Romberg is negative. Posture is normal. Gait is steady with normal steps, base, arm swing, and turning. She has no difficulty with heel and toe walking as with tandem gait with both eyes open.\par \par Modified Ranking Scale: 0\par \par NIHSS: 0\par \par Post angiogram: Right groin intact with no evidence of hematoma or bleeding and distal pulses are present.

## 2019-05-22 NOTE — ASSESSMENT
[FreeTextEntry1] : Plan:\par Avoid heavy lifting, constipation and bending head down. \par Keep blood pressure less than 140/80.\par We will discuss this case with neurovascular specialists within one month and come to a conclusion. \par

## 2019-05-22 NOTE — HISTORY OF PRESENT ILLNESS
[FreeTextEntry1] : Mrs. Palacios presents to the neuro-endovascular clinic for a follow up visit post elective diagnostic cerebral angiogram. She is a 33 year old right handed woman with a past medical history of gestational HTN and PCOS. She originally presented to the [facility] ED two days post-partum with a severe headache. She was found there to have a sub-arachnoid hemorrhage and was transferred to [facility]. We performed a diagnostic cerebral angiogram which failed to reveal a significant vascular abnormality. An MRI of the brain revealed a 1 cm left temporal superficial cavernoma which may have bled into the sulcus causing sub-arachnoid hemorrhage. Since her last visit she has no new complaints besides lack of sleep due to her . She has no new neurologic complaints. We repeated the diagnostic cerebral angiogram several weeks later and it revealed an area of ectasia in the left MCA territory which is atypical.

## 2019-05-22 NOTE — DISCUSSION/SUMMARY
[FreeTextEntry1] : Impression:\par Mrs. Palacios presents to the neuro-endovascular clinic for a follow up visit post elective diagnostic cerebral angiogram. Two days post-partum she suffered a severe sudden headache. She was found there to have a sub-arachnoid hemorrhage and was transferred to [facility]. We performed a diagnostic cerebral angiogram which failed to reveal a significant vascular abnormality. An MRI of the brain revealed a 1 cm left temporal superficial cavernoma which may have bled into the sulcus causing sub-arachnoid hemorrhage. Since her last visit she has no new complaints besides lack of sleep due to her . She has no new neurologic complaints. Lately she underwent the second diagnostic cerebral angiogram which revealed an area of ectasia in the left MCA territory which is atypical.

## 2019-06-13 PROBLEM — Z86.79 PERSONAL HISTORY OF OTHER DISEASES OF THE CIRCULATORY SYSTEM: Chronic | Status: ACTIVE | Noted: 2019-05-10

## 2019-06-17 ENCOUNTER — APPOINTMENT (OUTPATIENT)
Dept: NEUROSURGERY | Facility: CLINIC | Age: 34
End: 2019-06-17
Payer: COMMERCIAL

## 2019-06-17 VITALS
RESPIRATION RATE: 18 BRPM | SYSTOLIC BLOOD PRESSURE: 121 MMHG | WEIGHT: 166 LBS | HEART RATE: 64 BPM | TEMPERATURE: 97.8 F | HEIGHT: 62 IN | BODY MASS INDEX: 30.55 KG/M2 | DIASTOLIC BLOOD PRESSURE: 78 MMHG | OXYGEN SATURATION: 97 %

## 2019-06-17 PROCEDURE — 99214 OFFICE O/P EST MOD 30 MIN: CPT

## 2019-06-17 RX ORDER — LEVETIRACETAM 750 MG/1
750 TABLET, FILM COATED ORAL TWICE DAILY
Refills: 0 | Status: DISCONTINUED | COMMUNITY
End: 2019-06-17

## 2019-06-17 NOTE — PHYSICAL EXAM
[General Appearance - Alert] : alert [General Appearance - In No Acute Distress] : in no acute distress [Person] : oriented to person [Place] : oriented to place [Time] : oriented to time [Cranial Nerves Optic (II)] : visual acuity intact bilaterally,  pupils equal round and reactive to light [Cranial Nerves Oculomotor (III)] : extraocular motion intact [Cranial Nerves Trigeminal (V)] : facial sensation intact symmetrically [Cranial Nerves Facial (VII)] : face symmetrical [Cranial Nerves Vestibulocochlear (VIII)] : hearing was intact bilaterally [Cranial Nerves Glossopharyngeal (IX)] : tongue and palate midline [Cranial Nerves Accessory (XI - Cranial And Spinal)] : head turning and shoulder shrug symmetric [Cranial Nerves Hypoglossal (XII)] : there was no tongue deviation with protrusion [Motor Strength] : muscle strength was normal in all four extremities [Involuntary Movements] : no involuntary movements were seen [] : no respiratory distress [Exaggerated Use Of Accessory Muscles For Inspiration] : no accessory muscle use [Abnormal Walk] : normal gait

## 2019-06-18 NOTE — HISTORY OF PRESENT ILLNESS
[de-identified] : Margot Palacios is a 34yr old right handed female with a past medical history of gestational hypertension, PCOS, and asthma. She presented to [facility] with a subarachnoid hemorrhage December 30, 2019. At that time she was treated at [facility] and underwent diagnostic cerebral angiography which was negative for aneurysm at that time. She was discharged home when she was in stable condition. She followed up with Dr. Rider and underwent repeat diagnostic cerebral angiography 5/16/2019 which was suspicious for a dysplastic [id number] LMCA aneurysm. Today she feels well denies any motor, sensory, speech, visual abnormalities. She still has mild headaches. \par \par \par Dr. Leiva PCP\par

## 2019-06-18 NOTE — ASSESSMENT
[FreeTextEntry1] : Impression: 34yr old female with SAH 12/30/2018; partially thrombosed serpentine aneurysm lmca\par \par Plan:\par Reviewed patients prior imaging from [facility] including both cerebral angiograms and MRI brain\par the hemorrhage and mass effect hid the aneurysm on initial angiogram done at [facility]\par the second angiogram shows the partially thrombosed serpentine aneurysm \par Discussed endovascular embolization with vessel sacrifice in order to treat the aneurysm but she is at risk of having speech abnormalities after due to the location of the sacrificed vessel, therefore we will do WADA testing simultaneously. \par Discussed with patient she is still at risk for subarachnoid hemorrhage because her aneurysm is currently untreated\par MRI brain non con prior to the angiogram but same day as angiogram procedure in the morning .

## 2019-06-28 ENCOUNTER — APPOINTMENT (OUTPATIENT)
Dept: CARDIOLOGY | Facility: CLINIC | Age: 34
End: 2019-06-28
Payer: COMMERCIAL

## 2019-06-28 VITALS — SYSTOLIC BLOOD PRESSURE: 120 MMHG | DIASTOLIC BLOOD PRESSURE: 80 MMHG | RESPIRATION RATE: 18 BRPM | HEART RATE: 72 BPM

## 2019-06-28 VITALS — BODY MASS INDEX: 31.1 KG/M2 | WEIGHT: 169 LBS | HEIGHT: 62 IN

## 2019-06-28 PROCEDURE — 93000 ELECTROCARDIOGRAM COMPLETE: CPT

## 2019-06-28 PROCEDURE — 99213 OFFICE O/P EST LOW 20 MIN: CPT

## 2019-06-28 NOTE — PHYSICAL EXAM
[General Appearance - Well Developed] : well developed [Well Groomed] : well groomed [Normal Appearance] : normal appearance [No Deformities] : no deformities [General Appearance - Well Nourished] : well nourished [General Appearance - In No Acute Distress] : no acute distress [Normal Conjunctiva] : the conjunctiva exhibited no abnormalities [Eyelids - No Xanthelasma] : the eyelids demonstrated no xanthelasmas [Normal Oral Mucosa] : normal oral mucosa [No Oral Pallor] : no oral pallor [No Oral Cyanosis] : no oral cyanosis [Normal Jugular Venous A Waves Present] : normal jugular venous A waves present [Normal Jugular Venous V Waves Present] : normal jugular venous V waves present [Heart Rate And Rhythm] : heart rate and rhythm were normal [No Jugular Venous Vera A Waves] : no jugular venous vera A waves [Murmurs] : no murmurs present [Heart Sounds] : normal S1 and S2 [Exaggerated Use Of Accessory Muscles For Inspiration] : no accessory muscle use [Respiration, Rhythm And Depth] : normal respiratory rhythm and effort [Auscultation Breath Sounds / Voice Sounds] : lungs were clear to auscultation bilaterally [Bowel Sounds] : normal bowel sounds [Abdomen Tenderness] : non-tender [Abdomen Soft] : soft [Abdomen Mass (___ Cm)] : no abdominal mass palpated [Gait - Sufficient For Exercise Testing] : the gait was sufficient for exercise testing [Abnormal Walk] : normal gait [Nail Clubbing] : no clubbing of the fingernails [Cyanosis, Localized] : no localized cyanosis [Petechial Hemorrhages (___cm)] : no petechial hemorrhages [Skin Color & Pigmentation] : normal skin color and pigmentation [Skin Lesions] : no skin lesions [No Venous Stasis] : no venous stasis [] : no rash [No Skin Ulcers] : no skin ulcer [No Xanthoma] : no  xanthoma was observed [Oriented To Time, Place, And Person] : oriented to person, place, and time

## 2019-10-11 ENCOUNTER — APPOINTMENT (OUTPATIENT)
Dept: CARDIOLOGY | Facility: CLINIC | Age: 34
End: 2019-10-11
Payer: COMMERCIAL

## 2019-10-11 VITALS — WEIGHT: 172 LBS | BODY MASS INDEX: 31.65 KG/M2 | HEIGHT: 62 IN

## 2019-10-11 VITALS — SYSTOLIC BLOOD PRESSURE: 110 MMHG | RESPIRATION RATE: 18 BRPM | DIASTOLIC BLOOD PRESSURE: 70 MMHG | HEART RATE: 72 BPM

## 2019-10-11 PROCEDURE — 93000 ELECTROCARDIOGRAM COMPLETE: CPT

## 2019-10-11 PROCEDURE — 99213 OFFICE O/P EST LOW 20 MIN: CPT

## 2019-10-11 RX ORDER — FLUTICASONE PROPIONATE 50 UG/1
50 SPRAY, METERED NASAL
Refills: 0 | Status: ACTIVE | COMMUNITY

## 2019-10-11 RX ORDER — AZELASTINE HYDROCHLORIDE 137 UG/1
0.1 SPRAY, METERED NASAL
Refills: 0 | Status: ACTIVE | COMMUNITY

## 2019-10-11 NOTE — PHYSICAL EXAM
[General Appearance - Well Developed] : well developed [Normal Appearance] : normal appearance [Well Groomed] : well groomed [No Deformities] : no deformities [General Appearance - Well Nourished] : well nourished [General Appearance - In No Acute Distress] : no acute distress [Normal Conjunctiva] : the conjunctiva exhibited no abnormalities [Eyelids - No Xanthelasma] : the eyelids demonstrated no xanthelasmas [Normal Oral Mucosa] : normal oral mucosa [No Oral Pallor] : no oral pallor [No Oral Cyanosis] : no oral cyanosis [Normal Jugular Venous A Waves Present] : normal jugular venous A waves present [Normal Jugular Venous V Waves Present] : normal jugular venous V waves present [No Jugular Venous Vera A Waves] : no jugular venous vera A waves [Heart Rate And Rhythm] : heart rate and rhythm were normal [Heart Sounds] : normal S1 and S2 [Murmurs] : no murmurs present [Respiration, Rhythm And Depth] : normal respiratory rhythm and effort [Bowel Sounds] : normal bowel sounds [Exaggerated Use Of Accessory Muscles For Inspiration] : no accessory muscle use [Auscultation Breath Sounds / Voice Sounds] : lungs were clear to auscultation bilaterally [Abdomen Soft] : soft [Abdomen Tenderness] : non-tender [Abdomen Mass (___ Cm)] : no abdominal mass palpated [Abnormal Walk] : normal gait [Nail Clubbing] : no clubbing of the fingernails [Gait - Sufficient For Exercise Testing] : the gait was sufficient for exercise testing [Cyanosis, Localized] : no localized cyanosis [Petechial Hemorrhages (___cm)] : no petechial hemorrhages [Skin Color & Pigmentation] : normal skin color and pigmentation [Skin Lesions] : no skin lesions [No Venous Stasis] : no venous stasis [] : no rash [No Xanthoma] : no  xanthoma was observed [No Skin Ulcers] : no skin ulcer [Oriented To Time, Place, And Person] : oriented to person, place, and time

## 2020-01-09 ENCOUNTER — APPOINTMENT (OUTPATIENT)
Dept: CARDIOLOGY | Facility: CLINIC | Age: 35
End: 2020-01-09
Payer: COMMERCIAL

## 2020-01-09 VITALS — HEIGHT: 62 IN | WEIGHT: 178 LBS | BODY MASS INDEX: 32.76 KG/M2

## 2020-01-09 VITALS — SYSTOLIC BLOOD PRESSURE: 110 MMHG | DIASTOLIC BLOOD PRESSURE: 70 MMHG | HEART RATE: 80 BPM | RESPIRATION RATE: 18 BRPM

## 2020-01-09 PROCEDURE — 93000 ELECTROCARDIOGRAM COMPLETE: CPT

## 2020-01-09 PROCEDURE — 99213 OFFICE O/P EST LOW 20 MIN: CPT

## 2020-03-03 RX ORDER — AMLODIPINE BESYLATE 2.5 MG/1
2.5 TABLET ORAL DAILY
Qty: 90 | Refills: 3 | Status: ACTIVE | COMMUNITY
Start: 2019-01-17 | End: 1900-01-01

## 2020-05-11 ENCOUNTER — APPOINTMENT (OUTPATIENT)
Dept: CARDIOLOGY | Facility: CLINIC | Age: 35
End: 2020-05-11

## 2020-06-01 RX ORDER — LABETALOL HYDROCHLORIDE 300 MG/1
300 TABLET, FILM COATED ORAL EVERY 8 HOURS
Qty: 270 | Refills: 0 | Status: ACTIVE | COMMUNITY
Start: 1900-01-01 | End: 1900-01-01

## 2020-07-11 NOTE — PHYSICAL EXAM
POSTPARTUM PROGRESS NOTE    Murali CHOI Emelina is a 26 y.o. female POD#2 status post repeat  section at 38w2d in a pregnancy complicated by sickle cell disease and h/o stroke (requiring left eye prosthesis due to retinal detachment).   Patient is doing well this morning. She denies nausea, vomiting, fever or chills.  Patient reports moderate abdominal pain that is well relieved by oral pain medications. Lochia is mild. Patient is voiding without difficulty and ambulating with no difficulty. She has passed flatus, and has not had BM.  Patient does plan to breast feed. Patient desires nexplanon for contraception. She desires circumcision.     Objective:       Temp:  [96.6 °F (35.9 °C)-98.7 °F (37.1 °C)] 98.7 °F (37.1 °C)  Pulse:  [61-85] 72  Resp:  [16-20] 18  SpO2:  [97 %-100 %] 99 %  BP: (100-128)/(50-79) 115/57    General:   alert, appears stated age and cooperative   Lungs:   clear to auscultation bilaterally   Heart:   regular rate and rhythm, S1, S2 normal, no murmur, click, rub or gallop   Abdomen:  soft, non-tender; bowel sounds normal; no masses,  no organomegaly   Uterus:  firm located below the umblicus.    Incision: Bandage removed, incision clean, dry and intact   Extremities: peripheral pulses normal, no pedal edema, no clubbing or cyanosis     I/O    Intake/Output Summary (Last 24 hours) at 2020 1451  Last data filed at 2020 0500  Gross per 24 hour   Intake 500 ml   Output 2930 ml   Net -2430 ml     Assessment:     26 y.o.  with SC disease s/p rLTCS at 38w2d.     Plan:   1. Postpartum care:  - Patient doing well. Continue routine management and advances.  - Continue PO pain meds. Pain well controlled.  - Heme: H/h  >   - Encourage ambulation  - Circumcision desires and consented this AM  - Contraception - desires nexplanon  - Lactation consultation PRN  - Rh status positive     2. H/o ocular stroke   - retinal detachment occurred at that time requiring ocular  [Well Groomed] : well groomed [Normal Appearance] : normal appearance prosthesis   - patient was on lovenox 60 daily in the antepartum period   - discussed with MFM and decision made to continue this daily regimen for six weeks post partum   - regimen restarted on POD#1    3. Sickle cell disease   - followed by heme/onc, Dr. Shankar, last visit in January   - recommend outpatient follow up in post partum period     4. Anemia   - 7/20 post op, patient asymptomatic   - continue to monitor, transfuse if symptomatic     Dispo: As patient meets milestones, will plan to discharge as appropriate.    Rah Vazquez M.D., PGY4  Obstetrics & Gynecology        [General Appearance - Well Developed] : well developed [General Appearance - Well Nourished] : well nourished [No Deformities] : no deformities [General Appearance - In No Acute Distress] : no acute distress [Normal Conjunctiva] : the conjunctiva exhibited no abnormalities [Normal Oral Mucosa] : normal oral mucosa [Eyelids - No Xanthelasma] : the eyelids demonstrated no xanthelasmas [No Oral Pallor] : no oral pallor [No Oral Cyanosis] : no oral cyanosis [Normal Jugular Venous A Waves Present] : normal jugular venous A waves present [Normal Jugular Venous V Waves Present] : normal jugular venous V waves present [No Jugular Venous Vera A Waves] : no jugular venous vera A waves [Heart Sounds] : normal S1 and S2 [Heart Rate And Rhythm] : heart rate and rhythm were normal [Murmurs] : no murmurs present [Respiration, Rhythm And Depth] : normal respiratory rhythm and effort [Exaggerated Use Of Accessory Muscles For Inspiration] : no accessory muscle use [Auscultation Breath Sounds / Voice Sounds] : lungs were clear to auscultation bilaterally [Bowel Sounds] : normal bowel sounds [Abdomen Soft] : soft [Abdomen Tenderness] : non-tender [Abdomen Mass (___ Cm)] : no abdominal mass palpated [Abnormal Walk] : normal gait [Gait - Sufficient For Exercise Testing] : the gait was sufficient for exercise testing [Nail Clubbing] : no clubbing of the fingernails [Cyanosis, Localized] : no localized cyanosis [Petechial Hemorrhages (___cm)] : no petechial hemorrhages [Skin Color & Pigmentation] : normal skin color and pigmentation [] : no rash [No Venous Stasis] : no venous stasis [Skin Lesions] : no skin lesions [No Skin Ulcers] : no skin ulcer [No Xanthoma] : no  xanthoma was observed [Oriented To Time, Place, And Person] : oriented to person, place, and time

## 2020-07-13 ENCOUNTER — APPOINTMENT (OUTPATIENT)
Dept: CARDIOLOGY | Facility: CLINIC | Age: 35
End: 2020-07-13
Payer: COMMERCIAL

## 2020-07-13 VITALS — WEIGHT: 178 LBS | HEIGHT: 62 IN | BODY MASS INDEX: 32.76 KG/M2 | TEMPERATURE: 97.3 F

## 2020-07-13 VITALS — HEART RATE: 72 BPM | SYSTOLIC BLOOD PRESSURE: 110 MMHG | RESPIRATION RATE: 18 BRPM | DIASTOLIC BLOOD PRESSURE: 80 MMHG

## 2020-07-13 DIAGNOSIS — Z01.810 ENCOUNTER FOR PREPROCEDURAL CARDIOVASCULAR EXAMINATION: ICD-10-CM

## 2020-07-13 DIAGNOSIS — I10 ESSENTIAL (PRIMARY) HYPERTENSION: ICD-10-CM

## 2020-07-13 DIAGNOSIS — I67.1 CEREBRAL ANEURYSM, NONRUPTURED: ICD-10-CM

## 2020-07-13 DIAGNOSIS — E78.5 HYPERLIPIDEMIA, UNSPECIFIED: ICD-10-CM

## 2020-07-13 DIAGNOSIS — I60.9 NONTRAUMATIC SUBARACHNOID HEMORRHAGE, UNSPECIFIED: ICD-10-CM

## 2020-07-13 PROCEDURE — 99214 OFFICE O/P EST MOD 30 MIN: CPT

## 2020-07-13 PROCEDURE — 93000 ELECTROCARDIOGRAM COMPLETE: CPT

## 2020-07-13 RX ORDER — SUCRALFATE 1 G/1
1 TABLET ORAL
Qty: 30 | Refills: 0 | Status: ACTIVE | COMMUNITY
Start: 2020-07-06

## 2020-07-13 RX ORDER — DEXLANSOPRAZOLE 60 MG/1
60 CAPSULE, DELAYED RELEASE ORAL
Qty: 30 | Refills: 0 | Status: ACTIVE | COMMUNITY
Start: 2020-07-06

## 2020-07-13 NOTE — PHYSICAL EXAM
[General Appearance - Well Developed] : well developed [Well Groomed] : well groomed [Normal Appearance] : normal appearance [No Deformities] : no deformities [General Appearance - Well Nourished] : well nourished [General Appearance - In No Acute Distress] : no acute distress [Normal Conjunctiva] : the conjunctiva exhibited no abnormalities [Normal Oral Mucosa] : normal oral mucosa [Eyelids - No Xanthelasma] : the eyelids demonstrated no xanthelasmas [Normal Jugular Venous A Waves Present] : normal jugular venous A waves present [No Oral Cyanosis] : no oral cyanosis [No Oral Pallor] : no oral pallor [Normal Jugular Venous V Waves Present] : normal jugular venous V waves present [No Jugular Venous Vera A Waves] : no jugular venous vera A waves [Heart Sounds] : normal S1 and S2 [Heart Rate And Rhythm] : heart rate and rhythm were normal [Murmurs] : no murmurs present [Respiration, Rhythm And Depth] : normal respiratory rhythm and effort [Exaggerated Use Of Accessory Muscles For Inspiration] : no accessory muscle use [Auscultation Breath Sounds / Voice Sounds] : lungs were clear to auscultation bilaterally [Abdomen Tenderness] : non-tender [Bowel Sounds] : normal bowel sounds [Abdomen Soft] : soft [Abdomen Mass (___ Cm)] : no abdominal mass palpated [Abnormal Walk] : normal gait [Nail Clubbing] : no clubbing of the fingernails [Gait - Sufficient For Exercise Testing] : the gait was sufficient for exercise testing [Petechial Hemorrhages (___cm)] : no petechial hemorrhages [Cyanosis, Localized] : no localized cyanosis [Skin Color & Pigmentation] : normal skin color and pigmentation [No Venous Stasis] : no venous stasis [] : no rash [Skin Lesions] : no skin lesions [No Skin Ulcers] : no skin ulcer [No Xanthoma] : no  xanthoma was observed [Oriented To Time, Place, And Person] : oriented to person, place, and time

## 2020-10-06 ENCOUNTER — APPOINTMENT (OUTPATIENT)
Dept: CARDIOLOGY | Facility: CLINIC | Age: 35
End: 2020-10-06

## 2020-10-26 ENCOUNTER — APPOINTMENT (OUTPATIENT)
Dept: CARDIOLOGY | Facility: CLINIC | Age: 35
End: 2020-10-26

## 2021-01-14 NOTE — DISCHARGE NOTE ADULT - FUNCTIONAL STATUS DATE
10-Bruce-2019 Azithromycin Pregnancy And Lactation Text: This medication is considered safe during pregnancy and is also secreted in breast milk.

## 2021-10-07 NOTE — PROGRESS NOTE ADULT - ASSESSMENT
34 yo F w/ PMH of gestational HTN, asthma and PCOS transferred from [facility] for management of SAH with CTH showing small SAH in left frontotemporal region.    # Fever r/o infection   - f/u panculture  - UA positive, f/u urine culture  - start Rocephin 1g q24h (1/4) for 5d   - Tylenol around the clock     #SAH s/p diagnostic cerebral angiogram   - CTA head and neck (12/30): negative for any large vessel occlusion or significant stenosis  - MRV (1/1): concerning for venous sinus thrombosis  - MR Head (1/2): left temproal superficial cavernoma  - as per neurology, area of bleed does not correspond with the location of the cavernoma and need to rule out other cause  - change to labetolol 150 mg PO q6h  - c/w procardia 10 mg PO q8h    - d/c nicardipine drip  - target SBP goal to 100 - 130  - c/w nimodipine 30 mg q3 and NS @ 50 to prevent vasospasm  - c/w keppra 750 mg q12  - change to Mg 400 mg PO q8h  - c/w neuro checks q4h   - f/u BMP and Mg    # Hypertension, uncontrolled, r/o secondary cause  - less likely to be eclampsia   - r/o polycystic kidney disease, order US kidney  - c/w labetalol procardia    # Induced hypermagnesemia   - change to Mg 400 mg PO q8h   - f/u BMP  - maintains Mg between 2 - 4    # Intractable headache / nausea secondary to SAH  - zofran PRN for nausea   - tylenol standing for headache  - tramadol PRN for severe pain    Diet: DASH/TLC   GI ppx: ( protonix )  DVT ppx: ( SCD )  Activity: ambulate as tolerated  Code status: Full Code ( X ) / DNR (  ) / DNI (  )  Disposition: from home, currently requiring ICU monitoring    ( X ) Discussion with patient and/or family regarding goals of care 16

## 2021-11-20 NOTE — PACU DISCHARGE NOTE - NS MD DISCHARGE NOTE DISCHARGE
ICU Problem: Pressure Injury - Risk of  Goal: *Prevention of pressure injury  Description: Document Mike Scale and appropriate interventions in the flowsheet.   Outcome: Progressing Towards Goal  Note: Pressure Injury Interventions:  Sensory Interventions: Assess changes in LOC    Moisture Interventions: Absorbent underpads    Activity Interventions: Assess need for specialty bed    Mobility Interventions: HOB 30 degrees or less    Nutrition Interventions: Document food/fluid/supplement intake    Friction and Shear Interventions: HOB 30 degrees or less                Problem: Pain  Goal: *Control of Pain  Outcome: Progressing Towards Goal

## 2022-12-15 NOTE — PROGRESS NOTE ADULT - SUBJECTIVE AND OBJECTIVE BOX
Patient is a 33y old  Female who presents with a chief complaint of SAH , transfer from [facility] (08 Jan 2019 10:26)        Over Night Events:    No events  C/O back pain        ROS:  See HPI    PHYSICAL EXAM    ICU Vital Signs Last 24 Hrs  T(C): 36.1 (08 Jan 2019 08:00), Max: 37.2 (07 Jan 2019 12:00)  T(F): 96.9 (08 Jan 2019 08:00), Max: 98.9 (07 Jan 2019 12:00)  HR: 76 (08 Jan 2019 10:00) (58 - 86)  BP: 119/63 (08 Jan 2019 10:00) (107/65 - 160/92)  BP(mean): 83 (08 Jan 2019 10:00) (77 - 130)  ABP: --  ABP(mean): --  RR: 19 (08 Jan 2019 10:00) (9 - 33)  SpO2: 99% (08 Jan 2019 10:00) (98% - 100%)      General: NAD  HEENT: GISSELLE             Lymphatic system: No cervical LN   Lungs: Bilateral BS  Cardiovascular: Regular   Gastrointestinal: Soft, Positive BS  Extremities: No clubbing.  Moves extremities.  Full Range of motion   Skin: Warm, intact  Neurological: No motor or sensory deficit       01-07-19 @ 07:01  -  01-08-19 @ 07:00  --------------------------------------------------------  IN:    IV PiggyBack: 200 mL    sodium chloride 0.9%: 450 mL    sodium chloride 0.9%.: 1500 mL  Total IN: 2150 mL    OUT:    Voided: 1325 mL  Total OUT: 1325 mL    Total NET: 825 mL      01-08-19 @ 07:01  -  01-08-19 @ 11:05  --------------------------------------------------------  IN:    sodium chloride 0.9%.: 100 mL  Total IN: 100 mL    OUT:  Total OUT: 0 mL    Total NET: 100 mL          LABS:                            13.0   7.33  )-----------( 255      ( 08 Jan 2019 05:21 )             39.6                                               01-08    142  |  106  |  22<H>  ----------------------------<  83  4.4   |  18  |  0.6<L>    Ca    8.8      08 Jan 2019 05:21  Mg     2.0     01-08                                                                                                                                                                                                                           MEDICATIONS  (STANDING):  cefTRIAXone   IVPB 1 Gram(s) IV Intermittent every 24 hours  cefTRIAXone   IVPB      chlorhexidine 4% Liquid 1 Application(s) Topical <User Schedule>  docusate sodium 100 milliGRAM(s) Oral three times a day  labetalol 300 milliGRAM(s) Oral every 6 hours  levETIRAcetam  IVPB 750 milliGRAM(s) IV Intermittent every 12 hours  magnesium oxide 400 milliGRAM(s) Oral three times a day with meals  multivitamin 1 Tablet(s) Oral daily  niMODipine 30 milliGRAM(s) Oral every 2 hours  pantoprazole    Tablet 40 milliGRAM(s) Oral before breakfast  senna 2 Tablet(s) Oral at bedtime  sodium chloride 0.9%. 1000 milliLiter(s) (100 mL/Hr) IV Continuous <Continuous>    MEDICATIONS  (PRN):  acetaminophen   Tablet .. 650 milliGRAM(s) Oral every 4 hours PRN Moderate Pain (4 - 6)  hydrALAZINE Injectable 10 milliGRAM(s) IV Push every 6 hours PRN Hypertension  ketorolac   Injectable 10 milliGRAM(s) IV Push every 8 hours PRN Severe Pain (7 - 10) Treatment Number (Optional): 20

## 2024-09-13 ENCOUNTER — APPOINTMENT (OUTPATIENT)
Dept: CARDIOLOGY | Facility: CLINIC | Age: 39
End: 2024-09-13
Payer: COMMERCIAL

## 2024-09-13 VITALS — WEIGHT: 210 LBS | HEIGHT: 62 IN | BODY MASS INDEX: 38.64 KG/M2

## 2024-09-13 VITALS — DIASTOLIC BLOOD PRESSURE: 88 MMHG | HEART RATE: 75 BPM | SYSTOLIC BLOOD PRESSURE: 136 MMHG | RESPIRATION RATE: 18 BRPM

## 2024-09-13 DIAGNOSIS — E78.5 HYPERLIPIDEMIA, UNSPECIFIED: ICD-10-CM

## 2024-09-13 DIAGNOSIS — I10 ESSENTIAL (PRIMARY) HYPERTENSION: ICD-10-CM

## 2024-09-13 PROCEDURE — 93000 ELECTROCARDIOGRAM COMPLETE: CPT

## 2024-09-13 PROCEDURE — 99204 OFFICE O/P NEW MOD 45 MIN: CPT | Mod: 25

## 2024-09-13 RX ORDER — LISINOPRIL 10 MG/1
10 TABLET ORAL
Refills: 0 | Status: ACTIVE | COMMUNITY

## 2024-09-13 NOTE — PHYSICAL EXAM
[General Appearance - Well Developed] : well developed [Normal Appearance] : normal appearance [Well Groomed] : well groomed [General Appearance - Well Nourished] : well nourished [No Deformities] : no deformities [General Appearance - In No Acute Distress] : no acute distress [Normal Conjunctiva] : the conjunctiva exhibited no abnormalities [Eyelids - No Xanthelasma] : the eyelids demonstrated no xanthelasmas [Normal Oral Mucosa] : normal oral mucosa [No Oral Pallor] : no oral pallor [No Oral Cyanosis] : no oral cyanosis [Normal Jugular Venous A Waves Present] : normal jugular venous A waves present [Normal Jugular Venous V Waves Present] : normal jugular venous V waves present [No Jugular Venous Vera A Waves] : no jugular venous vera A waves [Heart Rate And Rhythm] : heart rate and rhythm were normal [Heart Sounds] : normal S1 and S2 [Murmurs] : no murmurs present [Respiration, Rhythm And Depth] : normal respiratory rhythm and effort [Exaggerated Use Of Accessory Muscles For Inspiration] : no accessory muscle use [Auscultation Breath Sounds / Voice Sounds] : lungs were clear to auscultation bilaterally [Abdomen Soft] : soft [Bowel Sounds] : normal bowel sounds [Abdomen Tenderness] : non-tender [Abdomen Mass (___ Cm)] : no abdominal mass palpated [Abnormal Walk] : normal gait [Gait - Sufficient For Exercise Testing] : the gait was sufficient for exercise testing [Nail Clubbing] : no clubbing of the fingernails [Cyanosis, Localized] : no localized cyanosis [Petechial Hemorrhages (___cm)] : no petechial hemorrhages [Skin Color & Pigmentation] : normal skin color and pigmentation [] : no rash [No Venous Stasis] : no venous stasis [Skin Lesions] : no skin lesions [No Skin Ulcers] : no skin ulcer [No Xanthoma] : no  xanthoma was observed [Oriented To Time, Place, And Person] : oriented to person, place, and time

## 2024-09-30 ENCOUNTER — APPOINTMENT (OUTPATIENT)
Dept: CARDIOLOGY | Facility: CLINIC | Age: 39
End: 2024-09-30

## 2024-10-03 ENCOUNTER — APPOINTMENT (OUTPATIENT)
Dept: CARDIOLOGY | Facility: CLINIC | Age: 39
End: 2024-10-03

## 2024-10-25 ENCOUNTER — APPOINTMENT (OUTPATIENT)
Dept: CARDIOLOGY | Facility: CLINIC | Age: 39
End: 2024-10-25